# Patient Record
Sex: MALE | Race: WHITE | NOT HISPANIC OR LATINO | URBAN - METROPOLITAN AREA
[De-identification: names, ages, dates, MRNs, and addresses within clinical notes are randomized per-mention and may not be internally consistent; named-entity substitution may affect disease eponyms.]

---

## 2020-05-12 ENCOUNTER — INPATIENT (INPATIENT)
Facility: HOSPITAL | Age: 85
LOS: 6 days | Discharge: ANOTHER HOSPITAL | DRG: 305 | End: 2020-05-19
Attending: INTERNAL MEDICINE | Admitting: INTERNAL MEDICINE
Payer: MEDICARE

## 2020-05-12 VITALS
DIASTOLIC BLOOD PRESSURE: 57 MMHG | HEIGHT: 67 IN | HEART RATE: 60 BPM | WEIGHT: 164.91 LBS | RESPIRATION RATE: 16 BRPM | OXYGEN SATURATION: 100 % | SYSTOLIC BLOOD PRESSURE: 173 MMHG

## 2020-05-12 DIAGNOSIS — R07.9 CHEST PAIN, UNSPECIFIED: ICD-10-CM

## 2020-05-12 DIAGNOSIS — E03.9 HYPOTHYROIDISM, UNSPECIFIED: ICD-10-CM

## 2020-05-12 DIAGNOSIS — R31.9 HEMATURIA, UNSPECIFIED: ICD-10-CM

## 2020-05-12 DIAGNOSIS — I10 ESSENTIAL (PRIMARY) HYPERTENSION: ICD-10-CM

## 2020-05-12 DIAGNOSIS — Z29.9 ENCOUNTER FOR PROPHYLACTIC MEASURES, UNSPECIFIED: ICD-10-CM

## 2020-05-12 DIAGNOSIS — N40.0 BENIGN PROSTATIC HYPERPLASIA WITHOUT LOWER URINARY TRACT SYMPTOMS: ICD-10-CM

## 2020-05-12 DIAGNOSIS — I25.10 ATHEROSCLEROTIC HEART DISEASE OF NATIVE CORONARY ARTERY WITHOUT ANGINA PECTORIS: ICD-10-CM

## 2020-05-12 LAB
ALBUMIN SERPL ELPH-MCNC: 3.4 G/DL — LOW (ref 3.5–5)
ALP SERPL-CCNC: 89 U/L — SIGNIFICANT CHANGE UP (ref 40–120)
ALT FLD-CCNC: 30 U/L DA — SIGNIFICANT CHANGE UP (ref 10–60)
ANION GAP SERPL CALC-SCNC: 9 MMOL/L — SIGNIFICANT CHANGE UP (ref 5–17)
APTT BLD: 30.1 SEC — SIGNIFICANT CHANGE UP (ref 27.5–36.3)
AST SERPL-CCNC: 15 U/L — SIGNIFICANT CHANGE UP (ref 10–40)
BILIRUB SERPL-MCNC: 0.5 MG/DL — SIGNIFICANT CHANGE UP (ref 0.2–1.2)
BLD GP AB SCN SERPL QL: SIGNIFICANT CHANGE UP
BUN SERPL-MCNC: 46 MG/DL — HIGH (ref 7–18)
CALCIUM SERPL-MCNC: 8.5 MG/DL — SIGNIFICANT CHANGE UP (ref 8.4–10.5)
CHLORIDE SERPL-SCNC: 107 MMOL/L — SIGNIFICANT CHANGE UP (ref 96–108)
CO2 SERPL-SCNC: 23 MMOL/L — SIGNIFICANT CHANGE UP (ref 22–31)
CREAT SERPL-MCNC: 2.19 MG/DL — HIGH (ref 0.5–1.3)
GLUCOSE SERPL-MCNC: 177 MG/DL — HIGH (ref 70–99)
HCT VFR BLD CALC: 28 % — LOW (ref 39–50)
HCT VFR BLD CALC: 28.4 % — LOW (ref 39–50)
HGB BLD-MCNC: 9.4 G/DL — LOW (ref 13–17)
HGB BLD-MCNC: 9.5 G/DL — LOW (ref 13–17)
INR BLD: 1.16 RATIO — SIGNIFICANT CHANGE UP (ref 0.88–1.16)
LIDOCAIN IGE QN: 102 U/L — SIGNIFICANT CHANGE UP (ref 73–393)
MAGNESIUM SERPL-MCNC: 2.1 MG/DL — SIGNIFICANT CHANGE UP (ref 1.6–2.6)
MCHC RBC-ENTMCNC: 32.3 PG — SIGNIFICANT CHANGE UP (ref 27–34)
MCHC RBC-ENTMCNC: 32.4 PG — SIGNIFICANT CHANGE UP (ref 27–34)
MCHC RBC-ENTMCNC: 33.5 GM/DL — SIGNIFICANT CHANGE UP (ref 32–36)
MCHC RBC-ENTMCNC: 33.6 GM/DL — SIGNIFICANT CHANGE UP (ref 32–36)
MCV RBC AUTO: 96.2 FL — SIGNIFICANT CHANGE UP (ref 80–100)
MCV RBC AUTO: 96.9 FL — SIGNIFICANT CHANGE UP (ref 80–100)
NRBC # BLD: 0 /100 WBCS — SIGNIFICANT CHANGE UP (ref 0–0)
NRBC # BLD: 0 /100 WBCS — SIGNIFICANT CHANGE UP (ref 0–0)
NT-PROBNP SERPL-SCNC: 1348 PG/ML — HIGH (ref 0–450)
PLATELET # BLD AUTO: 187 K/UL — SIGNIFICANT CHANGE UP (ref 150–400)
PLATELET # BLD AUTO: 191 K/UL — SIGNIFICANT CHANGE UP (ref 150–400)
POTASSIUM SERPL-MCNC: 4.2 MMOL/L — SIGNIFICANT CHANGE UP (ref 3.5–5.3)
POTASSIUM SERPL-SCNC: 4.2 MMOL/L — SIGNIFICANT CHANGE UP (ref 3.5–5.3)
PROT SERPL-MCNC: 7.2 G/DL — SIGNIFICANT CHANGE UP (ref 6–8.3)
PROTHROM AB SERPL-ACNC: 13.2 SEC — HIGH (ref 10–12.9)
RBC # BLD: 2.91 M/UL — LOW (ref 4.2–5.8)
RBC # BLD: 2.93 M/UL — LOW (ref 4.2–5.8)
RBC # FLD: 13.6 % — SIGNIFICANT CHANGE UP (ref 10.3–14.5)
RBC # FLD: 13.7 % — SIGNIFICANT CHANGE UP (ref 10.3–14.5)
SODIUM SERPL-SCNC: 139 MMOL/L — SIGNIFICANT CHANGE UP (ref 135–145)
TROPONIN I SERPL-MCNC: 0.02 NG/ML — SIGNIFICANT CHANGE UP (ref 0–0.04)
TROPONIN I SERPL-MCNC: <0.015 NG/ML — SIGNIFICANT CHANGE UP (ref 0–0.04)
WBC # BLD: 13.63 K/UL — HIGH (ref 3.8–10.5)
WBC # BLD: 19.08 K/UL — HIGH (ref 3.8–10.5)
WBC # FLD AUTO: 13.63 K/UL — HIGH (ref 3.8–10.5)
WBC # FLD AUTO: 19.08 K/UL — HIGH (ref 3.8–10.5)

## 2020-05-12 PROCEDURE — 74176 CT ABD & PELVIS W/O CONTRAST: CPT | Mod: 26

## 2020-05-12 PROCEDURE — 99285 EMERGENCY DEPT VISIT HI MDM: CPT

## 2020-05-12 PROCEDURE — 71045 X-RAY EXAM CHEST 1 VIEW: CPT | Mod: 26

## 2020-05-12 PROCEDURE — 99222 1ST HOSP IP/OBS MODERATE 55: CPT

## 2020-05-12 RX ORDER — FERROUS SULFATE 325(65) MG
325 TABLET ORAL DAILY
Refills: 0 | Status: DISCONTINUED | OUTPATIENT
Start: 2020-05-12 | End: 2020-05-19

## 2020-05-12 RX ORDER — CEFTRIAXONE 500 MG/1
1000 INJECTION, POWDER, FOR SOLUTION INTRAMUSCULAR; INTRAVENOUS EVERY 24 HOURS
Refills: 0 | Status: DISCONTINUED | OUTPATIENT
Start: 2020-05-12 | End: 2020-05-19

## 2020-05-12 RX ORDER — ASPIRIN/CALCIUM CARB/MAGNESIUM 324 MG
81 TABLET ORAL DAILY
Refills: 0 | Status: DISCONTINUED | OUTPATIENT
Start: 2020-05-12 | End: 2020-05-12

## 2020-05-12 RX ORDER — SODIUM CHLORIDE 9 MG/ML
1000 INJECTION INTRAMUSCULAR; INTRAVENOUS; SUBCUTANEOUS
Refills: 0 | Status: DISCONTINUED | OUTPATIENT
Start: 2020-05-12 | End: 2020-05-14

## 2020-05-12 RX ORDER — FOLIC ACID 0.8 MG
1 TABLET ORAL DAILY
Refills: 0 | Status: DISCONTINUED | OUTPATIENT
Start: 2020-05-12 | End: 2020-05-19

## 2020-05-12 RX ORDER — ATENOLOL 25 MG/1
150 TABLET ORAL DAILY
Refills: 0 | Status: DISCONTINUED | OUTPATIENT
Start: 2020-05-12 | End: 2020-05-15

## 2020-05-12 RX ORDER — ATORVASTATIN CALCIUM 80 MG/1
40 TABLET, FILM COATED ORAL AT BEDTIME
Refills: 0 | Status: DISCONTINUED | OUTPATIENT
Start: 2020-05-12 | End: 2020-05-19

## 2020-05-12 RX ORDER — OXYCODONE AND ACETAMINOPHEN 5; 325 MG/1; MG/1
1 TABLET ORAL EVERY 4 HOURS
Refills: 0 | Status: DISCONTINUED | OUTPATIENT
Start: 2020-05-12 | End: 2020-05-13

## 2020-05-12 RX ORDER — CHOLECALCIFEROL (VITAMIN D3) 125 MCG
1000 CAPSULE ORAL DAILY
Refills: 0 | Status: DISCONTINUED | OUTPATIENT
Start: 2020-05-12 | End: 2020-05-19

## 2020-05-12 RX ORDER — ACETAMINOPHEN 500 MG
650 TABLET ORAL EVERY 6 HOURS
Refills: 0 | Status: DISCONTINUED | OUTPATIENT
Start: 2020-05-12 | End: 2020-05-19

## 2020-05-12 RX ORDER — LOSARTAN POTASSIUM 100 MG/1
100 TABLET, FILM COATED ORAL DAILY
Refills: 0 | Status: DISCONTINUED | OUTPATIENT
Start: 2020-05-12 | End: 2020-05-14

## 2020-05-12 RX ORDER — LEVOTHYROXINE SODIUM 125 MCG
112 TABLET ORAL DAILY
Refills: 0 | Status: DISCONTINUED | OUTPATIENT
Start: 2020-05-12 | End: 2020-05-19

## 2020-05-12 RX ORDER — CLOPIDOGREL BISULFATE 75 MG/1
75 TABLET, FILM COATED ORAL DAILY
Refills: 0 | Status: DISCONTINUED | OUTPATIENT
Start: 2020-05-12 | End: 2020-05-14

## 2020-05-12 RX ADMIN — Medication 1 MILLIGRAM(S): at 21:37

## 2020-05-12 RX ADMIN — CEFTRIAXONE 100 MILLIGRAM(S): 500 INJECTION, POWDER, FOR SOLUTION INTRAMUSCULAR; INTRAVENOUS at 17:51

## 2020-05-12 RX ADMIN — Medication 112 MICROGRAM(S): at 21:37

## 2020-05-12 RX ADMIN — OXYCODONE AND ACETAMINOPHEN 1 TABLET(S): 5; 325 TABLET ORAL at 18:47

## 2020-05-12 RX ADMIN — OXYCODONE AND ACETAMINOPHEN 1 TABLET(S): 5; 325 TABLET ORAL at 20:52

## 2020-05-12 RX ADMIN — LOSARTAN POTASSIUM 100 MILLIGRAM(S): 100 TABLET, FILM COATED ORAL at 21:37

## 2020-05-12 RX ADMIN — ATORVASTATIN CALCIUM 40 MILLIGRAM(S): 80 TABLET, FILM COATED ORAL at 21:34

## 2020-05-12 NOTE — ED ADULT NURSE NOTE - NSIMPLEMENTINTERV_GEN_ALL_ED
Implemented All Fall with Harm Risk Interventions:  [place] to call system. Call bell, personal items and telephone within reach. Instruct patient to call for assistance. Room bathroom lighting operational. Non-slip footwear when patient is off stretcher. Physically safe environment: no spills, clutter or unnecessary equipment. Stretcher in lowest position, wheels locked, appropriate side rails in place. Provide visual cue, wrist band, yellow gown, etc. Monitor gait and stability. Monitor for mental status changes and reorient to person, place, and time. Review medications for side effects contributing to fall risk. Reinforce activity limits and safety measures with patient and family. Provide visual clues: red socks.

## 2020-05-12 NOTE — H&P ADULT - PROBLEM SELECTOR PLAN 7
IMPROVE VTE Individual Risk Assessment    RISK                                                                Points  [  ] Previous VTE                                                  3  [  ] Thrombophilia                                               2  [  ] Lower limb paralysis                                      2        (unable to hold up >15 seconds)    [  ] Current Cancer                                              2         (within 6 months)  [x ] Immobilization > 24 hrs                                1  [  ] ICU/CCU stay > 24 hours                              1  [x  ] Age > 60                                                      1  IMPROVE VTE Score __2_DVT ppx SCD boots for now due to Hematuria   )

## 2020-05-12 NOTE — ED ADULT NURSE NOTE - ED STAT RN HANDOFF DETAILS
Patient admitted to telemetry with Box B NSR in no acute distress, CBI in progress. Report given to LORI Goncalves , patient to be transported via stretcher stable in no acute distress, safety maintained.

## 2020-05-12 NOTE — CONSULT NOTE ADULT - GENITOURINARY COMMENTS
3 way acevedo catheter in place, CBI at moderate gtt; acevedo catheter irrigated, blood clots obtained

## 2020-05-12 NOTE — ED ADULT TRIAGE NOTE - CHIEF COMPLAINT QUOTE
Hypotensive upon ems arrival, 250 NS given via left ac 20 gauge.  Alert and oriented.  Permanent acevedo changed 2 weeks ago

## 2020-05-12 NOTE — H&P ADULT - PROBLEM SELECTOR PLAN 1
Pt presnted with chest pain  DDx PE vs Pneunonia vs MI vs GERD vs Aortic Dissection vs Costochondritis vs Myositis  likely  cardiac,  given Hx of CAD, HTN, Dyslipidemia, Obesity, CVA, AFib  will admit to telemetry to r/o ACS.  EKG- no ST T wave changes.  cardiac enzymes X 1 negative.  will trend T2, T3  f/u ECHO  will start on aspirin, statin and low dose lopressor..   cardio consult- Dr Weinberg Pt presnted with chest pain  likely  cardiac,  given Hx of CAD, HTN, Dyslipidemia, Obesity, CVA, AFib  will admit to telemetry to r/o ACS.  EKG- no ST T wave changes.  cardiac enzymes X 1 negative.  will trend T2, T3  f/u ECHO  aspirin is on hold due to hematuria.   Continue with Plavix after weighing Risk vs Benefit   c/w statin and low dose lopressor..   cardio consult- Dr Weinberg

## 2020-05-12 NOTE — H&P ADULT - ASSESSMENT
88 yo Male from Home w/ PMHx of CAD s/p stent ([id number]), HTN, Hypothyroid, Neurogenic Bladder and BPH with chronic Infante, DM, CKD and PSHx Green Light PVP 2010, presented to ED w/ hematuria since yesterday. Admitted to medicine for r/o ACS and Hematuria.

## 2020-05-12 NOTE — CONSULT NOTE ADULT - ASSESSMENT
88 y/o Male w/ Hematuria; hx BPH s/p Green Laser PVP 2010; PMHx CAD s/p stents on Clopidogrel     -C/w 3 way acevedo catheter   -C/w CBI at mod gtt   -Irrigate acevedo PRN   -Monitor h/h levels PRN   -Transfuse PRN   -Hold anticoagulation if safe   -F/u CT abd/pelvis   -F/u Ucx   -Medical admission   -D/w Dr Sim and agrees 86 y/o Male w/ Hematuria; hx BPH s/p Green Laser PVP 2010, chronic acevedo catheter; PMHx CAD s/p stents on Clopidogrel     -C/w 3 way acevedo catheter   -C/w CBI at mod gtt   -Irrigate acevedo PRN   -Monitor h/h levels PRN   -Transfuse PRN   -Hold anticoagulation if safe   -F/u CT abd/pelvis   -F/u Ucx   -Medical admission   -D/w Dr Sim and agrees

## 2020-05-12 NOTE — CONSULT NOTE ADULT - SUBJECTIVE AND OBJECTIVE BOX
Attending: Dr Sim       HPI:  86 yo Male w/ PMHx of CAD s/p stent ([id number]), HTN, Hypothyroid, BPH with chronic Acevedo, PSHx Green Light PVP 2010, presented to ED w/ hematuria; Pt seen and examined at bedside, states hematuria developed yesterday, no abd pain, no fever, chills; Pt currently under care of Dr [facility], pt w/ chronic acevedo, states acevedo catheter is being exchanged every month, last acevedo catheter change was 2 weeks ago; pt admits to previous hx of hematuria, 6 months ago was seen at [facility] w/ hematuria; pt offers no other complaints; 3 way acevedo catheter placed in ED, CBI started.       PAST MEDICAL & SURGICAL HISTORY:  S/P CABG (Coronary Artery Bypass Graft): x 2 vessels -2005or 2006 [facility]  BPH (Benign Prostatic Hyperplasia)  Dyslipidemia  Hypothyroidism  PAD (Peripheral Artery Disease): lower legs  CAD (Coronary Artery Disease): CABG-5-6 yrs ago- [facility]  HTN - Hypertension  S/P CABG (Coronary Artery Bypass Graft): x 2 vessels 2006 [facility]  HTN - Hypertension      Allergies  morphine (Nausea)  Intolerances    SOCIAL HISTORY:  Unknown   FAMILY HISTORY:  Unknown     Vital Signs Last 24 Hrs  T(C): --  T(F): --  HR: 60 (12 May 2020 11:36) (60 - 60)  BP: 173/57 (12 May 2020 11:36) (173/57 - 173/57)  BP(mean): --  RR: 16 (12 May 2020 11:36) (16 - 16)  SpO2: 100% (12 May 2020 11:36) (100% - 100%)    I&O's Summary      LABS:                        9.5    13.63 )-----------( 191      ( 12 May 2020 12:45 )             28.4     05-12    139  |  107  |  46<H>  ----------------------------<  177<H>  4.2   |  23  |  2.19<H>    Ca    8.5      12 May 2020 12:45  Mg     2.1     05-12    TPro  7.2  /  Alb  3.4<L>  /  TBili  0.5  /  DBili  x   /  AST  15  /  ALT  30  /  AlkPhos  89  05-12    PT/INR - ( 12 May 2020 12:45 )   PT: 13.2 sec;   INR: 1.16 ratio         PTT - ( 12 May 2020 12:45 )  PTT:30.1 sec    CAPILLARY BLOOD GLUCOSE        LIVER FUNCTIONS - ( 12 May 2020 12:45 )  Alb: 3.4 g/dL / Pro: 7.2 g/dL / ALK PHOS: 89 U/L / ALT: 30 U/L DA / AST: 15 U/L / GGT: x           RADIOLOGY & ADDITIONAL STUDIES:  < from: Xray Chest 1 View-PORTABLE IMMEDIATE (05.12.20 @ 13:03) >  Apical lordotic view of the chest demonstrates the lungs to be clear. There is no pleural effusion. There is no pneumothorax.    The heart is normal in size. Sternotomy wires are present. There is no mediastinal or hilar mass.     The pulmonary vasculature is normal.     Mild thoracic degenerative changes are present.    IMPRESSION:    No acute infiltrate.    Sternotomy.        DISCRETE X-RAY DATA:  Percent of LEFT lung opacification: Clear  Percent of RIGHT lung opacification: Clear  Change in lung opacification from most recent x-ray (<=3 days): No Prior  Change from prior dated 3 or more days (same admission): No Prior    < end of copied text >

## 2020-05-12 NOTE — H&P ADULT - PROBLEM SELECTOR PLAN 6
IMPROVE VTE Individual Risk Assessment    RISK                                                                Points  [  ] Previous VTE                                                  3  [  ] Thrombophilia                                               2  [  ] Lower limb paralysis                                      2        (unable to hold up >15 seconds)    [  ] Current Cancer                                              2         (within 6 months)  [x ] Immobilization > 24 hrs                                1  [  ] ICU/CCU stay > 24 hours                              1  [x  ] Age > 60                                                      1  IMPROVE VTE Score __2_DVT ppx SCD boots for now due to Hematuria   ) f/u TSH    started on home meds synthroid

## 2020-05-12 NOTE — ED PROVIDER NOTE - PMH
BPH (Benign Prostatic Hyperplasia)    CAD (Coronary Artery Disease)  CABG-5-6 yrs ago- [facility]  Dyslipidemia    HTN - Hypertension    Hypothyroidism    PAD (Peripheral Artery Disease)  lower legs  S/P CABG (Coronary Artery Bypass Graft)  x 2 vessels -2005or 2006 [facility]

## 2020-05-12 NOTE — ED ADULT NURSE NOTE - OBJECTIVE STATEMENT
Patient BIBA from home for hematuria via acevedo catheter since yesterday. As per patient he has been bleeding with clots since yesterday on pain scale 5/10. Catheter was changed 2 weeks ago and he changed his bag this morning

## 2020-05-12 NOTE — H&P ADULT - PROBLEM SELECTOR PLAN 5
f/u TSH    started on home meds synthroid Pt takes Losartan 100  and Atenolol 150  Will start on Atenolol 100 for now.

## 2020-05-12 NOTE — H&P ADULT - NSICDXPASTSURGICALHX_GEN_ALL_CORE_FT
PAST SURGICAL HISTORY:  HTN - Hypertension     S/P CABG (Coronary Artery Bypass Graft) x 2 vessels 2006 [facility]

## 2020-05-12 NOTE — H&P ADULT - NSHPPHYSICALEXAM_GEN_ALL_CORE
Vital Signs Last 24 Hrs  T(C): 36.5 (12 May 2020 16:07), Max: 36.5 (12 May 2020 16:07)  T(F): 97.7 (12 May 2020 16:07), Max: 97.7 (12 May 2020 16:07)  HR: 56 (12 May 2020 16:07) (56 - 60)  BP: 148/58 (12 May 2020 16:07) (148/58 - 173/57)  BP(mean): --  RR: 16 (12 May 2020 16:07) (16 - 16)  SpO2: 100% (12 May 2020 16:07) (100% - 100%)  PHYSICAL EXAM:  GENERAL: NAD, obese  HEAD:  Atraumatic, Normocephalic  EYES:  conjunctiva and sclera clear  NECK: Supple, No JVD, Normal thyroid  CHEST/LUNG: Clear to auscultation. Clear to percussion bilaterally; No rales, rhonchi, wheezing, or rubs  HEART: Regular rate and rhythm; No murmurs, rubs, or gallops  ABDOMEN: Soft, Nontender, Nondistended; Bowel sounds present, hematuria is present   NERVOUS SYSTEM:  Alert & Oriented X3,    EXTREMITIES:  2+ Peripheral Pulses, No clubbing, cyanosis, or edema  SKIN: warm dry

## 2020-05-12 NOTE — ED PROVIDER NOTE - CLINICAL SUMMARY MEDICAL DECISION MAKING FREE TEXT BOX
88 yo male with gross hematuria, history of chronic Infante also here with chest pressure. Will perform CXR, labs, CT abdomen/pelvis, and CBI.

## 2020-05-12 NOTE — PROGRESS NOTE ADULT - SUBJECTIVE AND OBJECTIVE BOX
HPI:mplaint: bloody urine and via acevedo since last night.    · Chief Complaint: The patient is a 87y Male complaining of bloody urine.	  · HPI Objective Statement: 88 yo male HTN, Hypothyroid, BPH with chronic Acevedo. presents with hematuria since last night described as dark blood with associated pain to penis. Patient also developed chest pressure this morning. Patient states he was home alone. Patient walks with walker. Patient denies fever, cough, shortness of breath, or dizziness.	    PAST MEDICAL & SURGICAL HISTORY:  S/P CABG (Coronary Artery Bypass Graft): x 2 vessels -2005or 2006 [facility]  BPH (Benign Prostatic Hyperplasia)  Dyslipidemia  Hypothyroidism  PAD (Peripheral Artery Disease): lower legs  CAD (Coronary Artery Disease): CABG-5-6 yrs ago- [facility]  HTN - Hypertension  S/P CABG (Coronary Artery Bypass Graft): x 2 vessels 2006 [facility]  HTN - Hypertension    Patient is a 87y old  Male who presents with a chief complaint of     INTERVAL HPI/OVERNIGHT EVENTS:  T(C): 36.5 (05-12-20 @ 16:07), Max: 36.5 (05-12-20 @ 16:07)  HR: 56 (05-12-20 @ 16:07) (56 - 60)  BP: 148/58 (05-12-20 @ 16:07) (148/58 - 173/57)  RR: 16 (05-12-20 @ 16:07) (16 - 16)  SpO2: 100% (05-12-20 @ 16:07) (100% - 100%)  Wt(kg): --  I&O's Summary    12 May 2020 07:01  -  12 May 2020 16:30  --------------------------------------------------------  IN: 0 mL / OUT: 5300 mL / NET: -5300 mL        REVIEW OF SYSTEMS: denies fever, chills, SOB, palpitations, chest pain, abdominal pain, nausea, vomitting, diarrhea, constipation, dizziness    MEDICATIONS  (STANDING):    MEDICATIONS  (PRN):      PHYSICAL EXAM:  GENERAL: NAD, well-groomed, well-developed  HEAD:  Atraumatic, Normocephalic  EYES: EOMI, PERRLA, conjunctiva and sclera clear  ENMT: No tonsillar erythema, exudates, or enlargement; Moist mucous membranes, Good dentition, No lesions  NECK: Supple, No JVD, Normal thyroid  NERVOUS SYSTEM:  Alert & Oriented X3, Good concentration; Motor Strength 5/5 B/L upper and lower extremities; DTRs 2+ intact and symmetric  CHEST/LUNG: Clear to percussion bilaterally; No rales, rhonchi, wheezing, or rubs  HEART: Regular rate and rhythm; No murmurs, rubs, or gallops  ABDOMEN: Soft, Nontender, Nondistended; Bowel sounds present  EXTREMITIES:  2+ Peripheral Pulses, No clubbing, cyanosis, or edema  LYMPH: No lymphadenopathy noted  SKIN: No rashes or lesions  LABS:                        9.5    13.63 )-----------( 191      ( 12 May 2020 12:45 )             28.4     05-12    139  |  107  |  46<H>  ----------------------------<  177<H>  4.2   |  23  |  2.19<H>    Ca    8.5      12 May 2020 12:45  Mg     2.1     05-12    TPro  7.2  /  Alb  3.4<L>  /  TBili  0.5  /  DBili  x   /  AST  15  /  ALT  30  /  AlkPhos  89  05-12    PT/INR - ( 12 May 2020 12:45 )   PT: 13.2 sec;   INR: 1.16 ratio         PTT - ( 12 May 2020 12:45 )  PTT:30.1 sec    CAPILLARY BLOOD GLUCOSE

## 2020-05-12 NOTE — H&P ADULT - PROBLEM SELECTOR PLAN 2
In ED CBI is started   3 lumen Infante is exchanged.   f/u Urology   CBC q12  Transfuse if Hb <8   Pain management with Tylenol and Percocet for now

## 2020-05-12 NOTE — ED PROVIDER NOTE - OBJECTIVE STATEMENT
86 yo male HTN, Hypothyroid, BPH with chronic Infante. presents with hematuria since last night described as dark blood with associated pain to penis. Patient also developed chest pressure this morning. Patient states he was home alone. Patient walks with walker. Patient denies fever, cough, shortness of breath, or dizziness.

## 2020-05-12 NOTE — H&P ADULT - NSICDXPASTMEDICALHX_GEN_ALL_CORE_FT
PAST MEDICAL HISTORY:  BPH (Benign Prostatic Hyperplasia)     CAD (Coronary Artery Disease) CABG-5-6 yrs ago- [facility]    Dyslipidemia     HTN - Hypertension     Hypothyroidism     PAD (Peripheral Artery Disease) lower legs    S/P CABG (Coronary Artery Bypass Graft) x 2 vessels -2005or 2006 [facility]

## 2020-05-12 NOTE — H&P ADULT - NSHPREVIEWOFSYSTEMS_GEN_ALL_CORE
REVIEW OF SYSTEMS:    CONSTITUTIONAL: No weakness, fevers or chills  EYES/ENT: No visual changes;  No vertigo or throat pain   NECK: No pain or stiffness  RESPIRATORY: No cough, wheezing, hemoptysis; No shortness of breath  CARDIOVASCULAR:  chest pain or palpitations is present   GASTROINTESTINAL: No abdominal or epigastric pain. No nausea, vomiting, or hematemesis; No diarrhea or constipation. No melena or hematochezia.  GENITOURINARY: Hematuria is present   NEUROLOGICAL: No numbness or weakness  SKIN: No itching, burning, rashes, or lesions   All other review of systems is negative unless indicated above.

## 2020-05-12 NOTE — H&P ADULT - NSHPLABSRESULTS_GEN_ALL_CORE
LABS:                        9.4    19.08 )-----------( 187      ( 12 May 2020 16:30 )             28.0                         9.5    13.63 )-----------( 191      ( 12 May 2020 12:45 )             28.4     05-12    139  |  107  |  46<H>  ----------------------------<  177<H>  4.2   |  23  |  2.19<H>    Ca    8.5      12 May 2020 12:45  Mg     2.1     05-12    TPro  7.2  /  Alb  3.4<L>  /  TBili  0.5  /  DBili  x   /  AST  15  /  ALT  30  /  AlkPhos  89  05-12    PT/INR - ( 12 May 2020 12:45 )   PT: 13.2 sec;   INR: 1.16 ratio         PTT - ( 12 May 2020 12:45 )  PTT:30.1 sec    LIVER FUNCTIONS - ( 12 May 2020 12:45 )  Alb: 3.4 g/dL / Pro: 7.2 g/dL / ALK PHOS: 89 U/L / ALT: 30 U/L DA / AST: 15 U/L / GGT: x           Lipase, Serum: 102 U/L (05-12-20 @ 12:45)

## 2020-05-13 DIAGNOSIS — Z71.89 OTHER SPECIFIED COUNSELING: ICD-10-CM

## 2020-05-13 DIAGNOSIS — Z02.9 ENCOUNTER FOR ADMINISTRATIVE EXAMINATIONS, UNSPECIFIED: ICD-10-CM

## 2020-05-13 LAB
24R-OH-CALCIDIOL SERPL-MCNC: 49.8 NG/ML — SIGNIFICANT CHANGE UP (ref 30–80)
A1C WITH ESTIMATED AVERAGE GLUCOSE RESULT: 6.4 % — HIGH (ref 4–5.6)
ALBUMIN SERPL ELPH-MCNC: 3.2 G/DL — LOW (ref 3.5–5)
ALP SERPL-CCNC: 88 U/L — SIGNIFICANT CHANGE UP (ref 40–120)
ALT FLD-CCNC: 25 U/L DA — SIGNIFICANT CHANGE UP (ref 10–60)
ANION GAP SERPL CALC-SCNC: 7 MMOL/L — SIGNIFICANT CHANGE UP (ref 5–17)
AST SERPL-CCNC: 26 U/L — SIGNIFICANT CHANGE UP (ref 10–40)
BASOPHILS # BLD AUTO: 0.05 K/UL — SIGNIFICANT CHANGE UP (ref 0–0.2)
BASOPHILS NFR BLD AUTO: 0.4 % — SIGNIFICANT CHANGE UP (ref 0–2)
BILIRUB SERPL-MCNC: 0.5 MG/DL — SIGNIFICANT CHANGE UP (ref 0.2–1.2)
BUN SERPL-MCNC: 42 MG/DL — HIGH (ref 7–18)
CALCIUM SERPL-MCNC: 8.6 MG/DL — SIGNIFICANT CHANGE UP (ref 8.4–10.5)
CHLORIDE SERPL-SCNC: 108 MMOL/L — SIGNIFICANT CHANGE UP (ref 96–108)
CHOLEST SERPL-MCNC: 117 MG/DL — SIGNIFICANT CHANGE UP (ref 10–199)
CK MB BLD-MCNC: 3 % — SIGNIFICANT CHANGE UP (ref 0–3.5)
CK MB BLD-MCNC: 3.5 % — SIGNIFICANT CHANGE UP (ref 0–3.5)
CK MB CFR SERPL CALC: 13.6 NG/ML — HIGH (ref 0–3.6)
CK MB CFR SERPL CALC: 29 NG/ML — HIGH (ref 0–3.6)
CK SERPL-CCNC: 456 U/L — HIGH (ref 35–232)
CK SERPL-CCNC: 835 U/L — HIGH (ref 35–232)
CO2 SERPL-SCNC: 24 MMOL/L — SIGNIFICANT CHANGE UP (ref 22–31)
CREAT SERPL-MCNC: 1.87 MG/DL — HIGH (ref 0.5–1.3)
EOSINOPHIL # BLD AUTO: 0.12 K/UL — SIGNIFICANT CHANGE UP (ref 0–0.5)
EOSINOPHIL NFR BLD AUTO: 1 % — SIGNIFICANT CHANGE UP (ref 0–6)
ERYTHROCYTE [SEDIMENTATION RATE] IN BLOOD: 38 MM/HR — HIGH (ref 0–20)
ESTIMATED AVERAGE GLUCOSE: 137 MG/DL — HIGH (ref 68–114)
FERRITIN SERPL-MCNC: 185 NG/ML — SIGNIFICANT CHANGE UP (ref 30–400)
FOLATE SERPL-MCNC: 11.4 NG/ML — SIGNIFICANT CHANGE UP
GLUCOSE SERPL-MCNC: 128 MG/DL — HIGH (ref 70–99)
HCT VFR BLD CALC: 26.9 % — LOW (ref 39–50)
HDLC SERPL-MCNC: 38 MG/DL — LOW
HGB BLD-MCNC: 8.9 G/DL — LOW (ref 13–17)
IMM GRANULOCYTES NFR BLD AUTO: 0.5 % — SIGNIFICANT CHANGE UP (ref 0–1.5)
IRON SATN MFR SERPL: 26 % — SIGNIFICANT CHANGE UP (ref 20–55)
IRON SATN MFR SERPL: 51 UG/DL — LOW (ref 65–170)
LIPID PNL WITH DIRECT LDL SERPL: 56 MG/DL — SIGNIFICANT CHANGE UP
LYMPHOCYTES # BLD AUTO: 1.67 K/UL — SIGNIFICANT CHANGE UP (ref 1–3.3)
LYMPHOCYTES # BLD AUTO: 13.6 % — SIGNIFICANT CHANGE UP (ref 13–44)
MAGNESIUM SERPL-MCNC: 2.1 MG/DL — SIGNIFICANT CHANGE UP (ref 1.6–2.6)
MCHC RBC-ENTMCNC: 32.1 PG — SIGNIFICANT CHANGE UP (ref 27–34)
MCHC RBC-ENTMCNC: 33.1 GM/DL — SIGNIFICANT CHANGE UP (ref 32–36)
MCV RBC AUTO: 97.1 FL — SIGNIFICANT CHANGE UP (ref 80–100)
MONOCYTES # BLD AUTO: 1.02 K/UL — HIGH (ref 0–0.9)
MONOCYTES NFR BLD AUTO: 8.3 % — SIGNIFICANT CHANGE UP (ref 2–14)
NEUTROPHILS # BLD AUTO: 9.35 K/UL — HIGH (ref 1.8–7.4)
NEUTROPHILS NFR BLD AUTO: 76.2 % — SIGNIFICANT CHANGE UP (ref 43–77)
NRBC # BLD: 0 /100 WBCS — SIGNIFICANT CHANGE UP (ref 0–0)
PHOSPHATE SERPL-MCNC: 3.4 MG/DL — SIGNIFICANT CHANGE UP (ref 2.5–4.5)
PLATELET # BLD AUTO: 208 K/UL — SIGNIFICANT CHANGE UP (ref 150–400)
POTASSIUM SERPL-MCNC: 4.1 MMOL/L — SIGNIFICANT CHANGE UP (ref 3.5–5.3)
POTASSIUM SERPL-SCNC: 4.1 MMOL/L — SIGNIFICANT CHANGE UP (ref 3.5–5.3)
PROT SERPL-MCNC: 6.8 G/DL — SIGNIFICANT CHANGE UP (ref 6–8.3)
RBC # BLD: 2.77 M/UL — LOW (ref 4.2–5.8)
RBC # FLD: 13.7 % — SIGNIFICANT CHANGE UP (ref 10.3–14.5)
SARS-COV-2 RNA SPEC QL NAA+PROBE: SIGNIFICANT CHANGE UP
SODIUM SERPL-SCNC: 139 MMOL/L — SIGNIFICANT CHANGE UP (ref 135–145)
TIBC SERPL-MCNC: 195 UG/DL — LOW (ref 250–450)
TOTAL CHOLESTEROL/HDL RATIO MEASUREMENT: 3.1 RATIO — LOW (ref 3.4–9.6)
TRIGL SERPL-MCNC: 115 MG/DL — SIGNIFICANT CHANGE UP (ref 10–149)
TROPONIN I SERPL-MCNC: 0.61 NG/ML — HIGH (ref 0–0.04)
TROPONIN I SERPL-MCNC: 0.96 NG/ML — HIGH (ref 0–0.04)
TROPONIN I SERPL-MCNC: 1.13 NG/ML — HIGH (ref 0–0.04)
TROPONIN I SERPL-MCNC: 1.93 NG/ML — HIGH (ref 0–0.04)
TSH SERPL-MCNC: 2.87 UU/ML — SIGNIFICANT CHANGE UP (ref 0.34–4.82)
UIBC SERPL-MCNC: 144 UG/DL — SIGNIFICANT CHANGE UP (ref 110–370)
VIT B12 SERPL-MCNC: 1700 PG/ML — HIGH (ref 232–1245)
WBC # BLD: 12.27 K/UL — HIGH (ref 3.8–10.5)
WBC # FLD AUTO: 12.27 K/UL — HIGH (ref 3.8–10.5)

## 2020-05-13 RX ORDER — LIDOCAINE 4 G/100G
1 CREAM TOPICAL DAILY
Refills: 0 | Status: DISCONTINUED | OUTPATIENT
Start: 2020-05-13 | End: 2020-05-19

## 2020-05-13 RX ORDER — ONDANSETRON 8 MG/1
4 TABLET, FILM COATED ORAL EVERY 8 HOURS
Refills: 0 | Status: DISCONTINUED | OUTPATIENT
Start: 2020-05-13 | End: 2020-05-19

## 2020-05-13 RX ORDER — ISOSORBIDE DINITRATE 5 MG/1
10 TABLET ORAL THREE TIMES A DAY
Refills: 0 | Status: DISCONTINUED | OUTPATIENT
Start: 2020-05-13 | End: 2020-05-18

## 2020-05-13 RX ORDER — TRAMADOL HYDROCHLORIDE 50 MG/1
25 TABLET ORAL EVERY 8 HOURS
Refills: 0 | Status: DISCONTINUED | OUTPATIENT
Start: 2020-05-13 | End: 2020-05-19

## 2020-05-13 RX ORDER — SENNA PLUS 8.6 MG/1
2 TABLET ORAL AT BEDTIME
Refills: 0 | Status: DISCONTINUED | OUTPATIENT
Start: 2020-05-13 | End: 2020-05-19

## 2020-05-13 RX ADMIN — CEFTRIAXONE 100 MILLIGRAM(S): 500 INJECTION, POWDER, FOR SOLUTION INTRAMUSCULAR; INTRAVENOUS at 17:17

## 2020-05-13 RX ADMIN — Medication 112 MICROGRAM(S): at 05:36

## 2020-05-13 RX ADMIN — SENNA PLUS 2 TABLET(S): 8.6 TABLET ORAL at 22:25

## 2020-05-13 RX ADMIN — LIDOCAINE 1 PATCH: 4 CREAM TOPICAL at 12:00

## 2020-05-13 RX ADMIN — Medication 325 MILLIGRAM(S): at 12:00

## 2020-05-13 RX ADMIN — ISOSORBIDE DINITRATE 10 MILLIGRAM(S): 5 TABLET ORAL at 22:25

## 2020-05-13 RX ADMIN — OXYCODONE AND ACETAMINOPHEN 1 TABLET(S): 5; 325 TABLET ORAL at 05:38

## 2020-05-13 RX ADMIN — ISOSORBIDE DINITRATE 10 MILLIGRAM(S): 5 TABLET ORAL at 12:00

## 2020-05-13 RX ADMIN — ATORVASTATIN CALCIUM 40 MILLIGRAM(S): 80 TABLET, FILM COATED ORAL at 22:25

## 2020-05-13 RX ADMIN — ONDANSETRON 4 MILLIGRAM(S): 8 TABLET, FILM COATED ORAL at 17:41

## 2020-05-13 RX ADMIN — Medication 1 DROP(S): at 22:25

## 2020-05-13 RX ADMIN — ATENOLOL 150 MILLIGRAM(S): 25 TABLET ORAL at 05:36

## 2020-05-13 RX ADMIN — Medication 1000 UNIT(S): at 12:00

## 2020-05-13 RX ADMIN — Medication 1 DROP(S): at 14:36

## 2020-05-13 RX ADMIN — Medication 1 MILLIGRAM(S): at 12:00

## 2020-05-13 RX ADMIN — CLOPIDOGREL BISULFATE 75 MILLIGRAM(S): 75 TABLET, FILM COATED ORAL at 12:00

## 2020-05-13 RX ADMIN — LIDOCAINE 1 PATCH: 4 CREAM TOPICAL at 20:21

## 2020-05-13 NOTE — PROGRESS NOTE ADULT - SUBJECTIVE AND OBJECTIVE BOX
INTERVAL HPI/OVERNIGHT EVENTS:  Pt resting comfortably. No acute complaints. CBI running. Denies abd/back pain.    MEDICATIONS  (STANDING):  artificial  tears Solution 1 Drop(s) Both EYES three times a day  ATENolol  Tablet 150 milliGRAM(s) Oral daily  atorvastatin 40 milliGRAM(s) Oral at bedtime  cefTRIAXone   IVPB 1000 milliGRAM(s) IV Intermittent every 24 hours  cholecalciferol 1000 Unit(s) Oral daily  clopidogrel Tablet 75 milliGRAM(s) Oral daily  ferrous    sulfate 325 milliGRAM(s) Oral daily  folic acid 1 milliGRAM(s) Oral daily  levothyroxine 112 MICROGram(s) Oral daily  losartan 100 milliGRAM(s) Oral daily  sodium chloride 0.9%. 1000 milliLiter(s) (75 mL/Hr) IV Continuous <Continuous>    MEDICATIONS  (PRN):  acetaminophen   Tablet .. 650 milliGRAM(s) Oral every 6 hours PRN Temp greater or equal to 38C (100.4F), Mild Pain (1 - 3)    Vital Signs Last 24 Hrs  T(C): 36.3 (13 May 2020 08:15), Max: 36.5 (12 May 2020 16:07)  T(F): 97.4 (13 May 2020 08:15), Max: 97.7 (12 May 2020 16:07)  HR: 57 (13 May 2020 08:15) (52 - 60)  BP: 128/49 (13 May 2020 08:15) (122/53 - 173/57)  BP(mean): 72 (13 May 2020 04:15) (72 - 78)  RR: 18 (13 May 2020 08:15) (16 - 18)  SpO2: 100% (13 May 2020 08:15) (100% - 100%)    Physical:  General: A&Ox3. NAD.  Abdomen: Soft nondistended, no suprapubic tenderness.  Back: No CVAT b/l    I&O's Detail    12 May 2020 07:01  -  13 May 2020 07:00  --------------------------------------------------------  IN:  Total IN: 0 mL    OUT:    Continuous Bladder Irrigation: 5300 mL  Total OUT: 5300 mL    Total NET: -5300 mL          LABS:                        8.9    12.27 )-----------( 208      ( 13 May 2020 06:23 )             26.9             05-13    139  |  108  |  42<H>  ----------------------------<  128<H>  4.1   |  24  |  1.87<H>    Ca    8.6      13 May 2020 06:23  Phos  3.4     05-13  Mg     2.1     05-13    TPro  6.8  /  Alb  3.2<L>  /  TBili  0.5  /  DBili  x   /  AST  26  /  ALT  25  /  AlkPhos  88  05-13      87y.o. Male INTERVAL HPI/OVERNIGHT EVENTS:  Pt resting comfortably. No acute complaints. CBI running. Denies abd/back pain.    MEDICATIONS  (STANDING):  artificial  tears Solution 1 Drop(s) Both EYES three times a day  ATENolol  Tablet 150 milliGRAM(s) Oral daily  atorvastatin 40 milliGRAM(s) Oral at bedtime  cefTRIAXone   IVPB 1000 milliGRAM(s) IV Intermittent every 24 hours  cholecalciferol 1000 Unit(s) Oral daily  clopidogrel Tablet 75 milliGRAM(s) Oral daily  ferrous    sulfate 325 milliGRAM(s) Oral daily  folic acid 1 milliGRAM(s) Oral daily  levothyroxine 112 MICROGram(s) Oral daily  losartan 100 milliGRAM(s) Oral daily  sodium chloride 0.9%. 1000 milliLiter(s) (75 mL/Hr) IV Continuous <Continuous>    MEDICATIONS  (PRN):  acetaminophen   Tablet .. 650 milliGRAM(s) Oral every 6 hours PRN Temp greater or equal to 38C (100.4F), Mild Pain (1 - 3)    Vital Signs Last 24 Hrs  T(C): 36.3 (13 May 2020 08:15), Max: 36.5 (12 May 2020 16:07)  T(F): 97.4 (13 May 2020 08:15), Max: 97.7 (12 May 2020 16:07)  HR: 57 (13 May 2020 08:15) (52 - 60)  BP: 128/49 (13 May 2020 08:15) (122/53 - 173/57)  BP(mean): 72 (13 May 2020 04:15) (72 - 78)  RR: 18 (13 May 2020 08:15) (16 - 18)  SpO2: 100% (13 May 2020 08:15) (100% - 100%)    Physical:  General: A&Ox3. NAD.  Abdomen: Soft nondistended, no suprapubic tenderness.  Back: No CVAT b/l    I&O's Detail    12 May 2020 07:01  -  13 May 2020 07:00  --------------------------------------------------------  IN:  Total IN: 0 mL    OUT:    Continuous Bladder Irrigation: 5300 mL light pink  Total OUT: 5300 mL    Total NET: -5300 mL    LABS:                        8.9    12.27 )-----------( 208      ( 13 May 2020 06:23 )             26.9             05-13    139  |  108  |  42<H>  ----------------------------<  128<H>  4.1   |  24  |  1.87<H>    Ca    8.6      13 May 2020 06:23  Phos  3.4     05-13  Mg     2.1     05-13    TPro  6.8  /  Alb  3.2<L>  /  TBili  0.5  /  DBili  x   /  AST  26  /  ALT  25  /  AlkPhos  88  05-13    < from: CT Abdomen and Pelvis No Cont (05.12.20 @ 15:04) >  IMPRESSION:     No urinary tract calculi or hydronephrosis.    Large blood clot in the bladder lumen. Follow-up cystoscopy to exclude an underlying mass.    Markedly Enlarged prostate with intravesical component of BPH.    < end of copied text >

## 2020-05-13 NOTE — PHYSICAL THERAPY INITIAL EVALUATION ADULT - GENERAL OBSERVATIONS, REHAB EVAL
pt emr review, ED holding referral, aerobic loss limiting functional mobility, cbi acevedo cath, CM, assistance required with bedmobility seated edge of bed assessment

## 2020-05-13 NOTE — CONSULT NOTE ADULT - ATTENDING COMMENTS
Patient was seen and examined by me on 05/13/2020,interim events noted,labs and radiology studies reviewed.  Salvador Weinberg MD,FACC.  [address] [place] [address].  [facility].  [phone]

## 2020-05-13 NOTE — CHART NOTE - NSCHARTNOTEFT_GEN_A_CORE
Patient's CT scan revealed large blood clot in urinary bladder with acevedo bag with blood tinged urine. Patient's CBI was stopped, the 22F 3-way acevedo catheter was removed and a 22F 6-eye acevedo catheter was placed using sterile technique in order to irrigate the blader with normal saline. Multiple small to medium pieces of clots evacuated with continued vigorous manual irrigation. The 6-eye catheter was exchanged back to new 22F 3-way acevedo catheter using sterile technique and CBI was restarted on slow drip with clear output.     - CBI now clamped: keep clamped and monitor urine output  - If urine output remains clear patient will follow-up with his outpatient urologist  - If urine output becomes bloody again pt may need cystoscopy

## 2020-05-13 NOTE — PROGRESS NOTE ADULT - SUBJECTIVE AND OBJECTIVE BOX
HPI:  88 yo Male from Home w/ PMHx of CAD s/p stent ([id number]), HTN, Hypothyroid, Neurogenic Bladder and BPH with chronic Acevedo, DM, CKD and PSHx Green Light PVP 2010, presented to ED w/ hematuria since yesterday. He c/o Chest pain, palpitations. It is intermittent, on and off, aggravated with exertion and relieved with rest. His Cardiologist is Dr Cunha [phone].  Pt currently under care of Dr [facility], pt w/ chronic acevedo, states acevedo catheter is being exchanged every month, last acevedo catheter change was 2 weeks ago; pt admits to previous hx of hematuria, 6 months ago was seen at [facility] w/ hematuria;   [facility] full code (12 May 2020 15:28)      Patient is a 87y old  Male who presents with a chief complaint of Hematuria (13 May 2020 10:45)      INTERVAL HPI/OVERNIGHT EVENTS:  T(C): 36.9 (05-13-20 @ 11:33), Max: 36.9 (05-13-20 @ 11:33)  HR: 55 (05-13-20 @ 11:33) (52 - 57)  BP: 138/68 (05-13-20 @ 11:33) (122/53 - 152/55)  RR: 20 (05-13-20 @ 11:33) (16 - 20)  SpO2: 98% (05-13-20 @ 11:33) (97% - 100%)  Wt(kg): --  I&O's Summary    12 May 2020 07:01  -  13 May 2020 07:00  --------------------------------------------------------  IN: 0 mL / OUT: 5300 mL / NET: -5300 mL    13 May 2020 07:01  -  13 May 2020 15:13  --------------------------------------------------------  IN: 3000 mL / OUT: 4500 mL / NET: -1500 mL        REVIEW OF SYSTEMS: denies fever, chills, SOB, palpitations, chest pain, abdominal pain, nausea, vomitting, diarrhea, constipation, dizziness    MEDICATIONS  (STANDING):  artificial  tears Solution 1 Drop(s) Both EYES three times a day  ATENolol  Tablet 150 milliGRAM(s) Oral daily  atorvastatin 40 milliGRAM(s) Oral at bedtime  cefTRIAXone   IVPB 1000 milliGRAM(s) IV Intermittent every 24 hours  cholecalciferol 1000 Unit(s) Oral daily  clopidogrel Tablet 75 milliGRAM(s) Oral daily  ferrous    sulfate 325 milliGRAM(s) Oral daily  folic acid 1 milliGRAM(s) Oral daily  isosorbide   dinitrate Tablet (ISORDIL) 10 milliGRAM(s) Oral three times a day  levothyroxine 112 MICROGram(s) Oral daily  lidocaine   Patch 1 Patch Transdermal daily  losartan 100 milliGRAM(s) Oral daily  sodium chloride 0.9%. 1000 milliLiter(s) (75 mL/Hr) IV Continuous <Continuous>    MEDICATIONS  (PRN):  acetaminophen   Tablet .. 650 milliGRAM(s) Oral every 6 hours PRN Temp greater or equal to 38C (100.4F), Mild Pain (1 - 3)  traMADol 25 milliGRAM(s) Oral every 8 hours PRN Moderate Pain (4 - 6)      PHYSICAL EXAM:  GENERAL: NAD, well-groomed, well-developed  HEAD:  Atraumatic, Normocephalic  EYES: EOMI, PERRLA, conjunctiva and sclera clear  ENMT: No tonsillar erythema, exudates, or enlargement; Moist mucous membranes, Good dentition, No lesions  NECK: Supple, No JVD, Normal thyroid  NERVOUS SYSTEM:  Alert & Oriented X3, Good concentration; Motor Strength 5/5 B/L upper and lower extremities; DTRs 2+ intact and symmetric  CHEST/LUNG: Clear to percussion bilaterally; No rales, rhonchi, wheezing, or rubs  HEART: Regular rate and rhythm; No murmurs, rubs, or gallops  ABDOMEN: Soft, Nontender, Nondistended; Bowel sounds present  EXTREMITIES:  2+ Peripheral Pulses, No clubbing, cyanosis, or edema  LYMPH: No lymphadenopathy noted  SKIN: No rashes or lesions  LABS:                        8.9    12.27 )-----------( 208      ( 13 May 2020 06:23 )             26.9     05-13    139  |  108  |  42<H>  ----------------------------<  128<H>  4.1   |  24  |  1.87<H>    Ca    8.6      13 May 2020 06:23  Phos  3.4     05-13  Mg     2.1     05-13    TPro  6.8  /  Alb  3.2<L>  /  TBili  0.5  /  DBili  x   /  AST  26  /  ALT  25  /  AlkPhos  88  05-13    PT/INR - ( 12 May 2020 12:45 )   PT: 13.2 sec;   INR: 1.16 ratio         PTT - ( 12 May 2020 12:45 )  PTT:30.1 sec    CAPILLARY BLOOD GLUCOSE

## 2020-05-13 NOTE — CONSULT NOTE ADULT - ASSESSMENT
86 yo Male from Home w/ PMHx of CAD s/p stent ([id number]), HTN, Hypothyroid, Neurogenic Bladder and BPH with chronic Infante, DM, CKD and PSHx Green Light PVP 2010, presented to ED w/ hematuria since yesterday. He c/o Chest pain, palpitations. It is intermittent, on and off, aggravated with exertion and relieved with rest.       Problem/Plan - 1:  ·  Problem: Chest pain -NSTEMI.  Plan: Pt presented with chest pain on exertion  likely  cardiac,  given Hx of CAD, HTN, Dyslipidemia, Obesity, CVA,  -EKG- no ST T wave changes.  -Troponins trending up  -TTE        Problem/Plan - 2:  ·  Problem: Hematuria.  Plan: In ED CBI is started   3 lumen Infante is exchanged.   Resolving hematuria  Hct stable      Problem/Plan - 3:  ·  Problem: CAD (Coronary Artery Disease).  Plan: continue with clopidogrel and statin.     Problem/Plan - 4:  ·  Problem: BPH (Benign Prostatic Hyperplasia).  Plan: c/w Infante.     Problem/Plan - 5:  ·  Problem: Hypertension.  Plan: Pt takes Losartan 100  and Atenolol 150  Will start on Atenolol 100 for now.     Problem/Plan - 6:  Problem: Hypothyroidism. Plan:Thyroid Stimulating Hormone, Serum (05.13.20 @ 06:23)    Thyroid Stimulating Hormone, Serum: 2.87 uU/mL  started on home meds synthroid.

## 2020-05-13 NOTE — CONSULT NOTE ADULT - SUBJECTIVE AND OBJECTIVE BOX
DATE OF SERVICE:Patient was seen,examined and evaluated on-05/13/2020    CHIEF COMPLAINT:Elevated troponins    HPI:88 yo Male from Home w/ PMHx of CAD s/p stent ([id number]), HTN, Hypothyroid, Neurogenic Bladder and BPH with chronic Acevedo, T2DM, CKD and PSHx Green Light PVP 2010, presented to ED w/ hematuria since yesterday. He c/o Chest pain, palpitations. It is intermittent, on and off, aggravated with exertion and relieved with rest.  Pt currently under care of Dr [facility], pt w/ chronic acevedo, states acevedo catheter is being exchanged every month, last acevedo catheter change was 2 weeks ago; pt admits to previous hx of hematuria, 6 months ago was seen at [facility] w/ hematuria; noted to have elevated troponins.        [facility] full code (12 May 2020 15:28)      PAST MEDICAL & SURGICAL HISTORY:  S/P CABG (Coronary Artery Bypass Graft): x 2 vessels -2005or 2006 [facility]  BPH (Benign Prostatic Hyperplasia)  Dyslipidemia  Hypothyroidism  PAD (Peripheral Artery Disease): lower legs  CAD (Coronary Artery Disease): CABG-5-6 yrs ago- [facility]  HTN - Hypertension  S/P CABG (Coronary Artery Bypass Graft): x 2 vessels 2006 [facility]  HTN - Hypertension      MEDICATIONS  (STANDING):  artificial  tears Solution 1 Drop(s) Both EYES three times a day  ATENolol  Tablet 150 milliGRAM(s) Oral daily  atorvastatin 40 milliGRAM(s) Oral at bedtime  cefTRIAXone   IVPB 1000 milliGRAM(s) IV Intermittent every 24 hours  cholecalciferol 1000 Unit(s) Oral daily  clopidogrel Tablet 75 milliGRAM(s) Oral daily  ferrous    sulfate 325 milliGRAM(s) Oral daily  folic acid 1 milliGRAM(s) Oral daily  levothyroxine 112 MICROGram(s) Oral daily  losartan 100 milliGRAM(s) Oral daily/  sodium chloride 0.9%. 1000 milliLiter(s) (75 mL/Hr) IV Continuous <Continuous>    MEDICATIONS  (PRN):  acetaminophen   Tablet .. 650 milliGRAM(s) Oral every 6 hours PRN Temp greater or equal to 38C (100.4F), Mild Pain (1 - 3)      FAMILY HISTORY:  No family history of premature coronary artery disease or sudden cardiac death    SOCIAL HISTORY:  Smoking-Former Smoker  Alcohol-Denies  Ilicit Drug use-Denies    REVIEW OF SYSTEMS:  Constitutional: [ ] fever, [ ]weight loss, [ ]fatigue Activity [ ] Bedbound,[x Ambulates [x Unassisted[ ] Cane/Walker [ ] Assistence.  Eyes: [ ] visual changes  Respiratory: [ ]shortness of breath;  [ ] cough, [ ]wheezing, [ ]chills, [ ]hemoptysis  Cardiovascular: [ ] chest pain, [ ]palpitations, [ ]dizziness,  [ ]leg swelling[ ]orthopnea [ ]PND  Gastrointestinal: [ ] abdominal pain, [ ]nausea, [ ]vomiting,  [ ]diarrhea,[ ]constipation  Genitourinary: [ ] dysuria, [ ] hematuria  Neurologic: [ ] headaches [ ] tremors[ ] weakness  Skin: [ ] itching, [ ]burning, [ ] rashes  Endocrine: [ ] heat or cold intolerance  Musculoskeletal: [ ] joint pain or swelling; [ ] muscle, back, or extremity pain  Psychiatric: [ ] depression, [ ]anxiety, [ ]mood swings, or [ ]difficulty sleeping  Hematologic: [ ] easy bruising, [ ] bleeding gums       [ x] All others negative	  [ ] Unable to obtain    Vital Signs Last 24 Hrs  T(C): 36.3 (13 May 2020 08:15), Max: 36.5 (12 May 2020 16:07)  T(F): 97.4 (13 May 2020 08:15), Max: 97.7 (12 May 2020 16:07)  HR: 53 (13 May 2020 10:37) (52 - 60)  BP: 128/49 (13 May 2020 08:15) (122/53 - 173/57)  BP(mean): 72 (13 May 2020 04:15) (72 - 78)  RR: 18 (13 May 2020 08:15) (16 - 18)  SpO2: 97% (13 May 2020 10:37) (97% - 100%)  I&O's Summary    12 May 2020 07:01  -  13 May 2020 07:00  --------------------------------------------------------  IN: 0 mL / OUT: 5300 mL / NET: -5300 mL        PHYSICAL EXAM:  General: No acute distress BMI-25.8  HEENT: EOMI, PERRL[ ] Icteric  Neck: Supple, No JVD  Lungs: Equal air entry bilaterally; [ ] Rales [ ] Rhonchi [ ] Wheezing  Heart: Regular rate and rhythm;[x] Murmurs-  2/6 [x] Systolic [ ] Diastolic [ ] Radiation,No rubs, or gallops  Abdomen: Nontender, bowel sounds present  Extremities: No clubbing, cyanosis, or edema[ ] Calf tenderness  Nervous system:  Alert & Oriented X3, no focal deficits  Psychiatric: Normal affect  Skin: No rashes or lesions      LABS:  05-13    139  |  108  |  42<H>  ----------------------------<  128<H>  4.1   |  24  |  1.87<H>    Ca    8.6      13 May 2020 06:23  Phos  3.4     05-13  Mg     2.1     05-13    TPro  6.8  /  Alb  3.2<L>  /  TBili  0.5  /  DBili  x   /  AST  26  /  ALT  25  /  AlkPhos  88  05-13    Creatinine Trend: 1.87<--, 2.19<--                        8.9    12.27 )-----------( 208      ( 13 May 2020 06:23 )             26.9     PT/INR - ( 12 May 2020 12:45 )   PT: 13.2 sec;   INR: 1.16 ratio         PTT - ( 12 May 2020 12:45 )  PTT:30.1 sec    Lipid Panel: Cholesterol, Serum 117  Direct LDL 56  HDL Cholesterol, Serum 38  Triglycerides, Serum 115    Cardiac Enzymes: CARDIAC MARKERS ( 13 May 2020 09:03 )  0.960 ng/mL / x     / x     / x     / x      CARDIAC MARKERS ( 13 May 2020 06:23 )  0.610 ng/mL / x     / 456 U/L / x     / 13.6 ng/mL  CARDIAC MARKERS ( 12 May 2020 19:02 )  0.019 ng/mL / x     / x     / x     / x      CARDIAC MARKERS ( 12 May 2020 12:45 )  <0.015 ng/mL / x     / 111 U/L / x     / 3.4 ng/mL      Serum Pro-Brain Natriuretic Peptide: 1348 pg/mL (05-12-20 @ 12:45)      COVID-19 PCR . (05.12.20 @ 15:21)    COVID-19 PCR: NotDetected    RADIOLOGY: CT ABDOMEN AND PELVIS            IMPRESSION:   No urinary tract calculi or hydronephrosis.  Large blood clot in the bladder lumen. Follow-up cystoscopy to exclude an underlying mass.  Markedly Enlarged prostate with intravesical component of BPH      ECG [my interpretation]:Sinus rhythm with sinus arrhythmia at 60 BPM QTc 442 ms    TELEMETRY:Sinus rhythm

## 2020-05-14 LAB
ALBUMIN SERPL ELPH-MCNC: 3 G/DL — LOW (ref 3.5–5)
ALP SERPL-CCNC: 78 U/L — SIGNIFICANT CHANGE UP (ref 40–120)
ALT FLD-CCNC: 25 U/L DA — SIGNIFICANT CHANGE UP (ref 10–60)
ANION GAP SERPL CALC-SCNC: 9 MMOL/L — SIGNIFICANT CHANGE UP (ref 5–17)
AST SERPL-CCNC: 46 U/L — HIGH (ref 10–40)
BILIRUB SERPL-MCNC: 0.3 MG/DL — SIGNIFICANT CHANGE UP (ref 0.2–1.2)
BUN SERPL-MCNC: 43 MG/DL — HIGH (ref 7–18)
CALCIUM SERPL-MCNC: 8.5 MG/DL — SIGNIFICANT CHANGE UP (ref 8.4–10.5)
CHLORIDE SERPL-SCNC: 106 MMOL/L — SIGNIFICANT CHANGE UP (ref 96–108)
CO2 SERPL-SCNC: 23 MMOL/L — SIGNIFICANT CHANGE UP (ref 22–31)
CREAT SERPL-MCNC: 1.93 MG/DL — HIGH (ref 0.5–1.3)
GLUCOSE BLDC GLUCOMTR-MCNC: 158 MG/DL — HIGH (ref 70–99)
GLUCOSE SERPL-MCNC: 110 MG/DL — HIGH (ref 70–99)
HCT VFR BLD CALC: 23.5 % — LOW (ref 39–50)
HCT VFR BLD CALC: 24.2 % — LOW (ref 39–50)
HGB BLD-MCNC: 7.9 G/DL — LOW (ref 13–17)
HGB BLD-MCNC: 8.1 G/DL — LOW (ref 13–17)
MCHC RBC-ENTMCNC: 31.9 PG — SIGNIFICANT CHANGE UP (ref 27–34)
MCHC RBC-ENTMCNC: 32.6 GM/DL — SIGNIFICANT CHANGE UP (ref 32–36)
MCHC RBC-ENTMCNC: 32.9 PG — SIGNIFICANT CHANGE UP (ref 27–34)
MCHC RBC-ENTMCNC: 34.5 GM/DL — SIGNIFICANT CHANGE UP (ref 32–36)
MCV RBC AUTO: 95.5 FL — SIGNIFICANT CHANGE UP (ref 80–100)
MCV RBC AUTO: 97.6 FL — SIGNIFICANT CHANGE UP (ref 80–100)
NRBC # BLD: 0 /100 WBCS — SIGNIFICANT CHANGE UP (ref 0–0)
NRBC # BLD: 0 /100 WBCS — SIGNIFICANT CHANGE UP (ref 0–0)
PLATELET # BLD AUTO: 173 K/UL — SIGNIFICANT CHANGE UP (ref 150–400)
PLATELET # BLD AUTO: 207 K/UL — SIGNIFICANT CHANGE UP (ref 150–400)
POTASSIUM SERPL-MCNC: 3.7 MMOL/L — SIGNIFICANT CHANGE UP (ref 3.5–5.3)
POTASSIUM SERPL-SCNC: 3.7 MMOL/L — SIGNIFICANT CHANGE UP (ref 3.5–5.3)
PROT SERPL-MCNC: 6.2 G/DL — SIGNIFICANT CHANGE UP (ref 6–8.3)
RBC # BLD: 2.46 M/UL — LOW (ref 4.2–5.8)
RBC # BLD: 2.48 M/UL — LOW (ref 4.2–5.8)
RBC # FLD: 13.6 % — SIGNIFICANT CHANGE UP (ref 10.3–14.5)
RBC # FLD: 13.7 % — SIGNIFICANT CHANGE UP (ref 10.3–14.5)
SODIUM SERPL-SCNC: 138 MMOL/L — SIGNIFICANT CHANGE UP (ref 135–145)
TROPONIN I SERPL-MCNC: 1.92 NG/ML — HIGH (ref 0–0.04)
WBC # BLD: 11.46 K/UL — HIGH (ref 3.8–10.5)
WBC # BLD: 9.41 K/UL — SIGNIFICANT CHANGE UP (ref 3.8–10.5)
WBC # FLD AUTO: 11.46 K/UL — HIGH (ref 3.8–10.5)
WBC # FLD AUTO: 9.41 K/UL — SIGNIFICANT CHANGE UP (ref 3.8–10.5)

## 2020-05-14 PROCEDURE — 99232 SBSQ HOSP IP/OBS MODERATE 35: CPT

## 2020-05-14 PROCEDURE — 93010 ELECTROCARDIOGRAM REPORT: CPT

## 2020-05-14 RX ORDER — ASPIRIN/CALCIUM CARB/MAGNESIUM 324 MG
81 TABLET ORAL DAILY
Refills: 0 | Status: DISCONTINUED | OUTPATIENT
Start: 2020-05-14 | End: 2020-05-19

## 2020-05-14 RX ORDER — ASPIRIN/CALCIUM CARB/MAGNESIUM 324 MG
81 TABLET ORAL DAILY
Refills: 0 | Status: DISCONTINUED | OUTPATIENT
Start: 2020-05-14 | End: 2020-05-14

## 2020-05-14 RX ADMIN — ISOSORBIDE DINITRATE 10 MILLIGRAM(S): 5 TABLET ORAL at 05:49

## 2020-05-14 RX ADMIN — LIDOCAINE 1 PATCH: 4 CREAM TOPICAL at 23:00

## 2020-05-14 RX ADMIN — CEFTRIAXONE 100 MILLIGRAM(S): 500 INJECTION, POWDER, FOR SOLUTION INTRAMUSCULAR; INTRAVENOUS at 17:03

## 2020-05-14 RX ADMIN — LIDOCAINE 1 PATCH: 4 CREAM TOPICAL at 19:24

## 2020-05-14 RX ADMIN — Medication 1000 UNIT(S): at 11:36

## 2020-05-14 RX ADMIN — Medication 1 DROP(S): at 05:49

## 2020-05-14 RX ADMIN — Medication 1 MILLIGRAM(S): at 11:36

## 2020-05-14 RX ADMIN — Medication 325 MILLIGRAM(S): at 11:36

## 2020-05-14 RX ADMIN — Medication 1 DROP(S): at 13:58

## 2020-05-14 RX ADMIN — Medication 112 MICROGRAM(S): at 05:49

## 2020-05-14 RX ADMIN — ISOSORBIDE DINITRATE 10 MILLIGRAM(S): 5 TABLET ORAL at 13:58

## 2020-05-14 RX ADMIN — LIDOCAINE 1 PATCH: 4 CREAM TOPICAL at 00:00

## 2020-05-14 RX ADMIN — Medication 650 MILLIGRAM(S): at 20:13

## 2020-05-14 RX ADMIN — ATORVASTATIN CALCIUM 40 MILLIGRAM(S): 80 TABLET, FILM COATED ORAL at 21:51

## 2020-05-14 RX ADMIN — SENNA PLUS 2 TABLET(S): 8.6 TABLET ORAL at 21:51

## 2020-05-14 RX ADMIN — LIDOCAINE 1 PATCH: 4 CREAM TOPICAL at 11:36

## 2020-05-14 RX ADMIN — CLOPIDOGREL BISULFATE 75 MILLIGRAM(S): 75 TABLET, FILM COATED ORAL at 11:36

## 2020-05-14 RX ADMIN — ISOSORBIDE DINITRATE 10 MILLIGRAM(S): 5 TABLET ORAL at 21:51

## 2020-05-14 RX ADMIN — Medication 1 DROP(S): at 21:51

## 2020-05-14 NOTE — PROGRESS NOTE ADULT - SUBJECTIVE AND OBJECTIVE BOX
INTERVAL HPI/OVERNIGHT EVENTS:  Pt resting comfortably. No acute complaints.   CBI clamped.   Denies abd pain.  CT reviewed with attending. Given lack of clots from manual irrigation yesterday, CT abnormal imaging likely enlarged median lobe of prostate.      MEDICATIONS  (STANDING):  artificial  tears Solution 1 Drop(s) Both EYES three times a day  ATENolol  Tablet 150 milliGRAM(s) Oral daily  atorvastatin 40 milliGRAM(s) Oral at bedtime  cefTRIAXone   IVPB 1000 milliGRAM(s) IV Intermittent every 24 hours  cholecalciferol 1000 Unit(s) Oral daily  clopidogrel Tablet 75 milliGRAM(s) Oral daily  ferrous    sulfate 325 milliGRAM(s) Oral daily  folic acid 1 milliGRAM(s) Oral daily  isosorbide   dinitrate Tablet (ISORDIL) 10 milliGRAM(s) Oral three times a day  levothyroxine 112 MICROGram(s) Oral daily  lidocaine   Patch 1 Patch Transdermal daily  losartan 100 milliGRAM(s) Oral daily  senna 2 Tablet(s) Oral at bedtime  sodium chloride 0.9%. 1000 milliLiter(s) (75 mL/Hr) IV Continuous <Continuous>    MEDICATIONS  (PRN):  acetaminophen   Tablet .. 650 milliGRAM(s) Oral every 6 hours PRN Temp greater or equal to 38C (100.4F), Mild Pain (1 - 3)  ondansetron Injectable 4 milliGRAM(s) IV Push every 8 hours PRN Nausea and/or Vomiting  traMADol 25 milliGRAM(s) Oral every 8 hours PRN Moderate Pain (4 - 6)      Vital Signs Last 24 Hrs  T(C): 36.4 (14 May 2020 07:47), Max: 37 (14 May 2020 04:43)  T(F): 97.5 (14 May 2020 07:47), Max: 98.6 (14 May 2020 04:43)  HR: 56 (14 May 2020 07:47) (53 - 62)  BP: 102/36 (14 May 2020 07:47) (92/44 - 138/68)  BP(mean): --  RR: 18 (14 May 2020 07:47) (17 - 20)  SpO2: 97% (14 May 2020 07:47) (95% - 99%)    Physical:  General: NAD.  Abdomen: Soft nondistended, no suprapubic tenderness.  Back: No CVAT b/l    I&O's Detail    13 May 2020 07:01  -  14 May 2020 07:00  --------------------------------------------------------  IN:    Continuous Bladder Irrigation: 6000 mL  Total IN: 6000 mL    OUT:    Continuous Bladder Irrigation: 52563 mL  Total OUT: 67805 mL    Total NET: -5000 mL      14 May 2020 07:01  -  14 May 2020 09:28  --------------------------------------------------------  IN:  Total IN: 0 mL    OUT:    Continuous Bladder Irrigation: 1000 mL UO clear  Total OUT: 1000 mL    Total NET: -1000 mL    LABS:                        7.9    11.46 )-----------( 207      ( 14 May 2020 06:04 )             24.2             05-14    138  |  106  |  43<H>  ----------------------------<  110<H>  3.7   |  23  |  1.93<H>    Ca    8.5      14 May 2020 06:04  Phos  3.4     05-13  Mg     2.1     05-13    TPro  6.2  /  Alb  3.0<L>  /  TBili  0.3  /  DBili  x   /  AST  46<H>  /  ALT  25  /  AlkPhos  78  05-14

## 2020-05-14 NOTE — CHART NOTE - NSCHARTNOTEFT_GEN_A_CORE
Patient was noted to have a n increase in troponin from 1.13 --> 1.9 and finally stable at 1.9. Patient not placed on heparin drip due to hematuria that is being addressed by Urology.     Throughout this time patient was asymptomatic and EKG revealed NSR.     Later in the evening writer was paged for suspected Pre-syncope in the patient prior to receiving an additional EKG. Patient stated he was dizzy and didn't fall, but was light headed.     Patient had normoglycemia, normotensive, with 93% saturation. Baseline mental status. Patient reported he wanted to have a bowel movement and drink some water and felt fine. EKG was unremarkable and showed NSR.     Primary team to follow up in the am.

## 2020-05-14 NOTE — PROGRESS NOTE ADULT - SUBJECTIVE AND OBJECTIVE BOX
PGY 1 Note discussed with supervising resident and primary attending    Patient is a 87y old  Male who presents with a chief complaint of Hematuria (14 May 2020 09:27)      INTERVAL HPI/OVERNIGHT EVENTS: offers no new complaints; current symptoms resolving    MEDICATIONS  (STANDING):  artificial  tears Solution 1 Drop(s) Both EYES three times a day  ATENolol  Tablet 150 milliGRAM(s) Oral daily  atorvastatin 40 milliGRAM(s) Oral at bedtime  cefTRIAXone   IVPB 1000 milliGRAM(s) IV Intermittent every 24 hours  cholecalciferol 1000 Unit(s) Oral daily  clopidogrel Tablet 75 milliGRAM(s) Oral daily  ferrous    sulfate 325 milliGRAM(s) Oral daily  folic acid 1 milliGRAM(s) Oral daily  isosorbide   dinitrate Tablet (ISORDIL) 10 milliGRAM(s) Oral three times a day  levothyroxine 112 MICROGram(s) Oral daily  lidocaine   Patch 1 Patch Transdermal daily  losartan 100 milliGRAM(s) Oral daily  senna 2 Tablet(s) Oral at bedtime  sodium chloride 0.9%. 1000 milliLiter(s) (75 mL/Hr) IV Continuous <Continuous>    MEDICATIONS  (PRN):  acetaminophen   Tablet .. 650 milliGRAM(s) Oral every 6 hours PRN Temp greater or equal to 38C (100.4F), Mild Pain (1 - 3)  ondansetron Injectable 4 milliGRAM(s) IV Push every 8 hours PRN Nausea and/or Vomiting  traMADol 25 milliGRAM(s) Oral every 8 hours PRN Moderate Pain (4 - 6)      __________________________________________________  REVIEW OF SYSTEMS:    CONSTITUTIONAL: No fever,   EYES: no acute visual disturbances  NECK: No pain or stiffness  RESPIRATORY: No cough; No shortness of breath  CARDIOVASCULAR: No chest pain, no palpitations  GASTROINTESTINAL: No pain. No nausea or vomiting; No diarrhea   NEUROLOGICAL: No headache or numbness, no tremors  MUSCULOSKELETAL: No joint pain, no muscle pain  GENITOURINARY: no dysuria, no frequency, no hesitancy  PSYCHIATRY: no depression , no anxiety  ALL OTHER  ROS negative        Vital Signs Last 24 Hrs  T(C): 36.4 (14 May 2020 07:47), Max: 37 (14 May 2020 04:43)  T(F): 97.5 (14 May 2020 07:47), Max: 98.6 (14 May 2020 04:43)  HR: 56 (14 May 2020 07:47) (53 - 62)  BP: 102/36 (14 May 2020 07:47) (92/44 - 138/68)  BP(mean): --  RR: 18 (14 May 2020 07:47) (17 - 20)  SpO2: 97% (14 May 2020 07:47) (95% - 99%)    ________________________________________________  PHYSICAL EXAM:  GENERAL: NAD  HEENT: Normocephalic;  conjunctivae and sclerae clear; moist mucous membranes;   NECK : supple  CHEST/LUNG: Clear to auscultation bilaterally with good air entry   HEART: S1 S2  regular; no murmurs, gallops or rubs  ABDOMEN: Soft, Nontender, Nondistended; Bowel sounds present  EXTREMITIES: no cyanosis; no edema; no calf tenderness  SKIN: warm and dry; no rash  NERVOUS SYSTEM:  Awake and alert; Oriented  to place, person and time ; no new deficits    _________________________________________________  LABS:                        7.9    11.46 )-----------( 207      ( 14 May 2020 06:04 )             24.2     05-14    138  |  106  |  43<H>  ----------------------------<  110<H>  3.7   |  23  |  1.93<H>    Ca    8.5      14 May 2020 06:04  Phos  3.4     05-13  Mg     2.1     05-13    TPro  6.2  /  Alb  3.0<L>  /  TBili  0.3  /  DBili  x   /  AST  46<H>  /  ALT  25  /  AlkPhos  78  05-14    PT/INR - ( 12 May 2020 12:45 )   PT: 13.2 sec;   INR: 1.16 ratio         PTT - ( 12 May 2020 12:45 )  PTT:30.1 sec    CAPILLARY BLOOD GLUCOSE            RADIOLOGY & ADDITIONAL TESTS:    Imaging Personally Reviewed:  YES/NO    Consultant(s) Notes Reviewed:   YES/ No    Care Discussed with Consultants :     Plan of care was discussed with patient and /or primary care giver; all questions and concerns were addressed and care was aligned with patient's wishes. PGY 1 Note discussed with supervising resident and primary attending    Patient is a 87y old  Male who presents with a chief complaint of Hematuria (14 May 2020 09:27)      INTERVAL HPI/OVERNIGHT EVENTS:   - Episode of presyncope overnight, was feeling giddy on exertion. Likely symptomatic anemia  - CBI clamped yesterday as per urology. Urine red tinged, however no gross blood.   - Chest pain remains intermittent. No SOB, on NC. No abdominal pain.  - Plan for transfer for cardiac cath tomorrow.    MEDICATIONS  (STANDING):  artificial  tears Solution 1 Drop(s) Both EYES three times a day  ATENolol  Tablet 150 milliGRAM(s) Oral daily  atorvastatin 40 milliGRAM(s) Oral at bedtime  cefTRIAXone   IVPB 1000 milliGRAM(s) IV Intermittent every 24 hours  cholecalciferol 1000 Unit(s) Oral daily  clopidogrel Tablet 75 milliGRAM(s) Oral daily  ferrous    sulfate 325 milliGRAM(s) Oral daily  folic acid 1 milliGRAM(s) Oral daily  isosorbide   dinitrate Tablet (ISORDIL) 10 milliGRAM(s) Oral three times a day  levothyroxine 112 MICROGram(s) Oral daily  lidocaine   Patch 1 Patch Transdermal daily  losartan 100 milliGRAM(s) Oral daily  senna 2 Tablet(s) Oral at bedtime  sodium chloride 0.9%. 1000 milliLiter(s) (75 mL/Hr) IV Continuous <Continuous>    MEDICATIONS  (PRN):  acetaminophen   Tablet .. 650 milliGRAM(s) Oral every 6 hours PRN Temp greater or equal to 38C (100.4F), Mild Pain (1 - 3)  ondansetron Injectable 4 milliGRAM(s) IV Push every 8 hours PRN Nausea and/or Vomiting  traMADol 25 milliGRAM(s) Oral every 8 hours PRN Moderate Pain (4 - 6)      __________________________________________________  REVIEW OF SYSTEMS:    Negative except as per HPI  ALL OTHER  ROS negative        Vital Signs Last 24 Hrs  T(C): 36.4 (14 May 2020 07:47), Max: 37 (14 May 2020 04:43)  T(F): 97.5 (14 May 2020 07:47), Max: 98.6 (14 May 2020 04:43)  HR: 56 (14 May 2020 07:47) (53 - 62)  BP: 102/36 (14 May 2020 07:47) (92/44 - 138/68)  BP(mean): --  RR: 18 (14 May 2020 07:47) (17 - 20)  SpO2: 97% (14 May 2020 07:47) (95% - 99%)    ________________________________________________  PHYSICAL EXAM:  GENERAL: NAD. Observed resting comfortably in bed, having breakfast appears pale, fatigued.  HEENT: Normocephalic;  conjunctivae and sclerae clear; moist mucous membranes;   NECK : supple  CHEST/LUNG: Clear to auscultation bilaterally with good air entry   HEART: S1 S2  regular; no murmurs, gallops or rubs  ABDOMEN: Soft, Nontender, Nondistended; Bowel sounds present  : Infante in place  EXTREMITIES: no cyanosis; no edema; no calf tenderness  SKIN: warm and dry; no rash  NERVOUS SYSTEM:  Awake and alert; Oriented  to place, person and time ; no new deficits    _________________________________________________  LABS:                        7.9    11.46 )-----------( 207      ( 14 May 2020 06:04 )             24.2     05-14    138  |  106  |  43<H>  ----------------------------<  110<H>  3.7   |  23  |  1.93<H>    Ca    8.5      14 May 2020 06:04  Phos  3.4     05-13  Mg     2.1     05-13    TPro  6.2  /  Alb  3.0<L>  /  TBili  0.3  /  DBili  x   /  AST  46<H>  /  ALT  25  /  AlkPhos  78  05-14    PT/INR - ( 12 May 2020 12:45 )   PT: 13.2 sec;   INR: 1.16 ratio         PTT - ( 12 May 2020 12:45 )  PTT:30.1 sec    CAPILLARY BLOOD GLUCOSE            RADIOLOGY & ADDITIONAL TESTS:    Imaging Personally Reviewed:  YES    Consultant(s) Notes Reviewed:   YES    Care Discussed with Consultants :     Plan of care was discussed with patient and /or primary care giver; all questions and concerns were addressed and care was aligned with patient's wishes.

## 2020-05-15 LAB
ALBUMIN SERPL ELPH-MCNC: 2.6 G/DL — LOW (ref 3.5–5)
ALP SERPL-CCNC: 66 U/L — SIGNIFICANT CHANGE UP (ref 40–120)
ALT FLD-CCNC: 27 U/L DA — SIGNIFICANT CHANGE UP (ref 10–60)
ANION GAP SERPL CALC-SCNC: 7 MMOL/L — SIGNIFICANT CHANGE UP (ref 5–17)
AST SERPL-CCNC: 47 U/L — HIGH (ref 10–40)
BILIRUB SERPL-MCNC: 0.3 MG/DL — SIGNIFICANT CHANGE UP (ref 0.2–1.2)
BLD GP AB SCN SERPL QL: SIGNIFICANT CHANGE UP
BUN SERPL-MCNC: 44 MG/DL — HIGH (ref 7–18)
CALCIUM SERPL-MCNC: 8 MG/DL — LOW (ref 8.4–10.5)
CHLORIDE SERPL-SCNC: 108 MMOL/L — SIGNIFICANT CHANGE UP (ref 96–108)
CO2 SERPL-SCNC: 24 MMOL/L — SIGNIFICANT CHANGE UP (ref 22–31)
CREAT SERPL-MCNC: 1.87 MG/DL — HIGH (ref 0.5–1.3)
GLUCOSE SERPL-MCNC: 141 MG/DL — HIGH (ref 70–99)
HCT VFR BLD CALC: 25.9 % — LOW (ref 39–50)
HCT VFR BLD CALC: 27.7 % — LOW (ref 39–50)
HCT VFR BLD CALC: 28.3 % — LOW (ref 39–50)
HGB BLD-MCNC: 9 G/DL — LOW (ref 13–17)
HGB BLD-MCNC: 9.4 G/DL — LOW (ref 13–17)
HGB BLD-MCNC: 9.8 G/DL — LOW (ref 13–17)
MCHC RBC-ENTMCNC: 31.9 PG — SIGNIFICANT CHANGE UP (ref 27–34)
MCHC RBC-ENTMCNC: 32.5 PG — SIGNIFICANT CHANGE UP (ref 27–34)
MCHC RBC-ENTMCNC: 32.5 PG — SIGNIFICANT CHANGE UP (ref 27–34)
MCHC RBC-ENTMCNC: 33.9 GM/DL — SIGNIFICANT CHANGE UP (ref 32–36)
MCHC RBC-ENTMCNC: 34.6 GM/DL — SIGNIFICANT CHANGE UP (ref 32–36)
MCHC RBC-ENTMCNC: 34.7 GM/DL — SIGNIFICANT CHANGE UP (ref 32–36)
MCV RBC AUTO: 93.5 FL — SIGNIFICANT CHANGE UP (ref 80–100)
MCV RBC AUTO: 93.7 FL — SIGNIFICANT CHANGE UP (ref 80–100)
MCV RBC AUTO: 93.9 FL — SIGNIFICANT CHANGE UP (ref 80–100)
NRBC # BLD: 0 /100 WBCS — SIGNIFICANT CHANGE UP (ref 0–0)
PLATELET # BLD AUTO: 166 K/UL — SIGNIFICANT CHANGE UP (ref 150–400)
PLATELET # BLD AUTO: 168 K/UL — SIGNIFICANT CHANGE UP (ref 150–400)
PLATELET # BLD AUTO: 189 K/UL — SIGNIFICANT CHANGE UP (ref 150–400)
POTASSIUM SERPL-MCNC: 3.6 MMOL/L — SIGNIFICANT CHANGE UP (ref 3.5–5.3)
POTASSIUM SERPL-SCNC: 3.6 MMOL/L — SIGNIFICANT CHANGE UP (ref 3.5–5.3)
PROT SERPL-MCNC: 5.5 G/DL — LOW (ref 6–8.3)
RBC # BLD: 2.77 M/UL — LOW (ref 4.2–5.8)
RBC # BLD: 2.95 M/UL — LOW (ref 4.2–5.8)
RBC # BLD: 3.02 M/UL — LOW (ref 4.2–5.8)
RBC # FLD: 13.8 % — SIGNIFICANT CHANGE UP (ref 10.3–14.5)
RBC # FLD: 13.8 % — SIGNIFICANT CHANGE UP (ref 10.3–14.5)
RBC # FLD: 14.2 % — SIGNIFICANT CHANGE UP (ref 10.3–14.5)
SARS-COV-2 RNA SPEC QL NAA+PROBE: SIGNIFICANT CHANGE UP
SODIUM SERPL-SCNC: 139 MMOL/L — SIGNIFICANT CHANGE UP (ref 135–145)
WBC # BLD: 10.08 K/UL — SIGNIFICANT CHANGE UP (ref 3.8–10.5)
WBC # BLD: 9.45 K/UL — SIGNIFICANT CHANGE UP (ref 3.8–10.5)
WBC # BLD: 9.77 K/UL — SIGNIFICANT CHANGE UP (ref 3.8–10.5)
WBC # FLD AUTO: 10.08 K/UL — SIGNIFICANT CHANGE UP (ref 3.8–10.5)
WBC # FLD AUTO: 9.45 K/UL — SIGNIFICANT CHANGE UP (ref 3.8–10.5)
WBC # FLD AUTO: 9.77 K/UL — SIGNIFICANT CHANGE UP (ref 3.8–10.5)

## 2020-05-15 PROCEDURE — 99232 SBSQ HOSP IP/OBS MODERATE 35: CPT

## 2020-05-15 PROCEDURE — 51700 IRRIGATION OF BLADDER: CPT

## 2020-05-15 RX ORDER — TRAMADOL HYDROCHLORIDE 50 MG/1
25 TABLET ORAL ONCE
Refills: 0 | Status: DISCONTINUED | OUTPATIENT
Start: 2020-05-15 | End: 2020-05-15

## 2020-05-15 RX ORDER — OXYCODONE AND ACETAMINOPHEN 5; 325 MG/1; MG/1
1 TABLET ORAL ONCE
Refills: 0 | Status: DISCONTINUED | OUTPATIENT
Start: 2020-05-15 | End: 2020-05-15

## 2020-05-15 RX ORDER — ATENOLOL 25 MG/1
100 TABLET ORAL DAILY
Refills: 0 | Status: DISCONTINUED | OUTPATIENT
Start: 2020-05-15 | End: 2020-05-18

## 2020-05-15 RX ADMIN — TRAMADOL HYDROCHLORIDE 25 MILLIGRAM(S): 50 TABLET ORAL at 02:52

## 2020-05-15 RX ADMIN — SENNA PLUS 2 TABLET(S): 8.6 TABLET ORAL at 22:04

## 2020-05-15 RX ADMIN — ATENOLOL 150 MILLIGRAM(S): 25 TABLET ORAL at 05:22

## 2020-05-15 RX ADMIN — Medication 1 DROP(S): at 05:22

## 2020-05-15 RX ADMIN — ISOSORBIDE DINITRATE 10 MILLIGRAM(S): 5 TABLET ORAL at 22:04

## 2020-05-15 RX ADMIN — ISOSORBIDE DINITRATE 10 MILLIGRAM(S): 5 TABLET ORAL at 05:22

## 2020-05-15 RX ADMIN — ISOSORBIDE DINITRATE 10 MILLIGRAM(S): 5 TABLET ORAL at 13:23

## 2020-05-15 RX ADMIN — Medication 325 MILLIGRAM(S): at 11:34

## 2020-05-15 RX ADMIN — Medication 112 MICROGRAM(S): at 05:22

## 2020-05-15 RX ADMIN — Medication 1000 UNIT(S): at 11:34

## 2020-05-15 RX ADMIN — Medication 81 MILLIGRAM(S): at 11:34

## 2020-05-15 RX ADMIN — CEFTRIAXONE 100 MILLIGRAM(S): 500 INJECTION, POWDER, FOR SOLUTION INTRAMUSCULAR; INTRAVENOUS at 17:14

## 2020-05-15 RX ADMIN — Medication 650 MILLIGRAM(S): at 06:07

## 2020-05-15 RX ADMIN — TRAMADOL HYDROCHLORIDE 25 MILLIGRAM(S): 50 TABLET ORAL at 23:40

## 2020-05-15 RX ADMIN — Medication 1 DROP(S): at 22:04

## 2020-05-15 RX ADMIN — TRAMADOL HYDROCHLORIDE 25 MILLIGRAM(S): 50 TABLET ORAL at 06:53

## 2020-05-15 RX ADMIN — Medication 1 MILLIGRAM(S): at 11:34

## 2020-05-15 RX ADMIN — ATORVASTATIN CALCIUM 40 MILLIGRAM(S): 80 TABLET, FILM COATED ORAL at 22:04

## 2020-05-15 RX ADMIN — Medication 1 DROP(S): at 13:23

## 2020-05-15 RX ADMIN — LIDOCAINE 1 PATCH: 4 CREAM TOPICAL at 11:34

## 2020-05-15 NOTE — PROGRESS NOTE ADULT - SUBJECTIVE AND OBJECTIVE BOX
HPI:  86 yo Male from Home w/ PMHx of CAD s/p stent ([id number]), HTN, Hypothyroid, Neurogenic Bladder and BPH with chronic Acevedo, DM, CKD and PSHx Green Light PVP 2010, presented to ED w/ hematuria since yesterday. He c/o Chest pain, palpitations. It is intermittent, on and off, aggravated with exertion and relieved with rest. His Cardiologist is Dr Cunha [phone].  Pt currently under care of Dr [facility], pt w/ chronic acevedo, states acevedo catheter is being exchanged every month, last acevedo catheter change was 2 weeks ago; pt admits to previous hx of hematuria, 6 months ago was seen at [facility] w/ hematuria;   [facility] full code (12 May 2020 15:28)      Patient is a 87y old  Male who presents with a chief complaint of Hematuria (15 May 2020 15:06)      INTERVAL HPI/OVERNIGHT EVENTS:  T(C): 36.2 (05-15-20 @ 16:00), Max: 37.5 (05-15-20 @ 08:04)  HR: 48 (05-15-20 @ 16:00) (48 - 92)  BP: 92/50 (05-15-20 @ 16:00) (91/50 - 147/60)  RR: 18 (05-15-20 @ 16:00) (17 - 19)  SpO2: 99% (05-15-20 @ 16:00) (97% - 100%)  Wt(kg): --  I&O's Summary    14 May 2020 07:01  -  15 May 2020 07:00  --------------------------------------------------------  IN: 32664 mL / OUT: 86417 mL / NET: 3605 mL    15 May 2020 07:01  -  15 May 2020 17:26  --------------------------------------------------------  IN: 0 mL / OUT: 36558 mL / NET: -50471 mL        REVIEW OF SYSTEMS: denies fever, chills, SOB, palpitations, chest pain, abdominal pain, nausea, vomitting, diarrhea, constipation, dizziness    MEDICATIONS  (STANDING):  artificial  tears Solution 1 Drop(s) Both EYES three times a day  aspirin enteric coated 81 milliGRAM(s) Oral daily  ATENolol  Tablet 100 milliGRAM(s) Oral daily  atorvastatin 40 milliGRAM(s) Oral at bedtime  cefTRIAXone   IVPB 1000 milliGRAM(s) IV Intermittent every 24 hours  cholecalciferol 1000 Unit(s) Oral daily  ferrous    sulfate 325 milliGRAM(s) Oral daily  folic acid 1 milliGRAM(s) Oral daily  isosorbide   dinitrate Tablet (ISORDIL) 10 milliGRAM(s) Oral three times a day  levothyroxine 112 MICROGram(s) Oral daily  lidocaine   Patch 1 Patch Transdermal daily  senna 2 Tablet(s) Oral at bedtime    MEDICATIONS  (PRN):  acetaminophen   Tablet .. 650 milliGRAM(s) Oral every 6 hours PRN Temp greater or equal to 38C (100.4F), Mild Pain (1 - 3)  ondansetron Injectable 4 milliGRAM(s) IV Push every 8 hours PRN Nausea and/or Vomiting  traMADol 25 milliGRAM(s) Oral every 8 hours PRN Moderate Pain (4 - 6)      PHYSICAL EXAM:  GENERAL: NAD, well-groomed, well-developed  HEAD:  Atraumatic, Normocephalic  EYES: EOMI, PERRLA, conjunctiva and sclera clear  ENMT: No tonsillar erythema, exudates, or enlargement; Moist mucous membranes, Good dentition, No lesions  NECK: Supple, No JVD, Normal thyroid  NERVOUS SYSTEM:  Alert & Oriented X3, Good concentration; Motor Strength 5/5 B/L upper and lower extremities; DTRs 2+ intact and symmetric  CHEST/LUNG: Clear to percussion bilaterally; No rales, rhonchi, wheezing, or rubs  HEART: Regular rate and rhythm; No murmurs, rubs, or gallops  ABDOMEN: Soft, Nontender, Nondistended; Bowel sounds present  EXTREMITIES:  2+ Peripheral Pulses, No clubbing, cyanosis, or edema  LYMPH: No lymphadenopathy noted  SKIN: No rashes or lesions  LABS:                        9.8    10.08 )-----------( 189      ( 15 May 2020 14:11 )             28.3     05-15    139  |  108  |  44<H>  ----------------------------<  141<H>  3.6   |  24  |  1.87<H>    Ca    8.0<L>      15 May 2020 05:45    TPro  5.5<L>  /  Alb  2.6<L>  /  TBili  0.3  /  DBili  x   /  AST  47<H>  /  ALT  27  /  AlkPhos  66  05-15        CAPILLARY BLOOD GLUCOSE

## 2020-05-15 NOTE — PROGRESS NOTE ADULT - SUBJECTIVE AND OBJECTIVE BOX
Subjective:87yMale with hematuria      Vital Signs Last 24 Hrs  T(C): 36.3 (15 May 2020 11:53), Max: 37.5 (15 May 2020 08:04)  T(F): 97.4 (15 May 2020 11:53), Max: 99.5 (15 May 2020 08:04)  HR: 50 (15 May 2020 11:53) (50 - 92)  BP: 130/44 (15 May 2020 11:53) (91/50 - 147/60)  BP(mean): --  RR: 18 (15 May 2020 11:53) (17 - 19)  SpO2: 100% (15 May 2020 11:53) (97% - 100%)  I&O's Detail    14 May 2020 07:01  -  15 May 2020 07:00  --------------------------------------------------------  IN:    Continuous Bladder Irrigation: 07979 mL  Total IN: 42715 mL    OUT:    Continuous Bladder Irrigation: 05276 mL    Voided: 210 mL  Total OUT: 79088 mL    Total NET: 3605 mL      15 May 2020 07:01  -  15 May 2020 15:07  --------------------------------------------------------  IN:  Total IN: 0 mL    OUT:    Continuous Bladder Irrigation: 96651 mL  Total OUT: 58461 mL    Total NET: -26464 mL          Labs:                        9.8    10.08 )-----------( 189      ( 15 May 2020 14:11 )             28.3     05-15    139  |  108  |  44<H>  ----------------------------<  141<H>  3.6   |  24  |  1.87<H>    Ca    8.0<L>      15 May 2020 05:45    TPro  5.5<L>  /  Alb  2.6<L>  /  TBili  0.3  /  DBili  x   /  AST  47<H>  /  ALT  27  /  AlkPhos  66  05-15        Physical Exam:nad  Abdomen:soft nt   acevedo clear on mod cbi

## 2020-05-15 NOTE — PROGRESS NOTE ADULT - SUBJECTIVE AND OBJECTIVE BOX
PGY 1 Note discussed with supervising resident and primary attending    Patient is a 87y old  Male who presents with a chief complaint of Hematuria (15 May 2020 09:37)      INTERVAL HPI/OVERNIGHT EVENTS:   - Was transfused 2 units pRBC yesterday, with stable hgb in the AM  - Episode of urinary retention this AM due to clot in acevedo, evacuated by Surgery  - Patient uncomfortable overnight due to abdominal pain and distension which has now resolved.   - Feels comfortable this AM, Chest pain absent, no SOB, no further abdominal pain, no fever, chills.    MEDICATIONS  (STANDING):  artificial  tears Solution 1 Drop(s) Both EYES three times a day  aspirin enteric coated 81 milliGRAM(s) Oral daily  ATENolol  Tablet 100 milliGRAM(s) Oral daily  atorvastatin 40 milliGRAM(s) Oral at bedtime  cefTRIAXone   IVPB 1000 milliGRAM(s) IV Intermittent every 24 hours  cholecalciferol 1000 Unit(s) Oral daily  ferrous    sulfate 325 milliGRAM(s) Oral daily  folic acid 1 milliGRAM(s) Oral daily  isosorbide   dinitrate Tablet (ISORDIL) 10 milliGRAM(s) Oral three times a day  levothyroxine 112 MICROGram(s) Oral daily  lidocaine   Patch 1 Patch Transdermal daily  senna 2 Tablet(s) Oral at bedtime    MEDICATIONS  (PRN):  acetaminophen   Tablet .. 650 milliGRAM(s) Oral every 6 hours PRN Temp greater or equal to 38C (100.4F), Mild Pain (1 - 3)  ondansetron Injectable 4 milliGRAM(s) IV Push every 8 hours PRN Nausea and/or Vomiting  traMADol 25 milliGRAM(s) Oral every 8 hours PRN Moderate Pain (4 - 6)      __________________________________________________  REVIEW OF SYSTEMS:    Negative except as per ROS  ALL OTHER  ROS negative        Vital Signs Last 24 Hrs  T(C): 37.5 (15 May 2020 08:04), Max: 37.5 (15 May 2020 08:04)  T(F): 99.5 (15 May 2020 08:04), Max: 99.5 (15 May 2020 08:04)  HR: 50 (15 May 2020 08:04) (50 - 92)  BP: 133/57 (15 May 2020 08:04) (91/50 - 147/60)  BP(mean): --  RR: 18 (15 May 2020 08:04) (17 - 20)  SpO2: 100% (15 May 2020 08:04) (97% - 100%)    ________________________________________________  PHYSICAL EXAM:  GENERAL: NAD. Observed resting uncomfortably in bed, appears pale, fatigued.  HEENT: Normocephalic;  conjunctivae and sclerae clear; moist mucous membranes;   NECK : supple  CHEST/LUNG: Clear to auscultation bilaterally with good air entry   HEART: S1 S2  regular; no murmurs, gallops or rubs  ABDOMEN: Soft, Nontender, Nondistended; Bowel sounds present  : Acevedo in place  EXTREMITIES: no cyanosis; no edema; no calf tenderness  SKIN: warm and dry; no rash  NERVOUS SYSTEM:  Awake and alert; Oriented  to place, person and time ; no new deficits  _________________________________________________  LABS:                        9.0    9.77  )-----------( 168      ( 15 May 2020 05:45 )             25.9     05-15    139  |  108  |  44<H>  ----------------------------<  141<H>  3.6   |  24  |  1.87<H>    Ca    8.0<L>      15 May 2020 05:45    TPro  5.5<L>  /  Alb  2.6<L>  /  TBili  0.3  /  DBili  x   /  AST  47<H>  /  ALT  27  /  AlkPhos  66  05-15        CAPILLARY BLOOD GLUCOSE            RADIOLOGY & ADDITIONAL TESTS:    Imaging Personally Reviewed:  YES    Consultant(s) Notes Reviewed:   YES    Care Discussed with Consultants :     Plan of care was discussed with patient and /or primary care giver; all questions and concerns were addressed and care was aligned with patient's wishes. PGY 1 Note discussed with supervising resident and primary attending    Patient is a 87y old  Male who presents with a chief complaint of Hematuria (15 May 2020 09:37)      INTERVAL HPI/OVERNIGHT EVENTS:   - Was transfused 2 units pRBC yesterday, with stable hgb in the AM  - Episode of urinary retention this AM due to clot in acevedo, evacuated by Surgery  - Patient uncomfortable overnight due to abdominal pain and distension which has now resolved.   - Feels comfortable this AM, Chest pain absent, no SOB, no further abdominal pain, no fever, chills.  - Due to persistent hematuria, plans for cath postponed. As per urology, if patient requires cardiac cath, then would require transfer for prostatic artery embolization      MEDICATIONS  (STANDING):  artificial  tears Solution 1 Drop(s) Both EYES three times a day  aspirin enteric coated 81 milliGRAM(s) Oral daily  ATENolol  Tablet 100 milliGRAM(s) Oral daily  atorvastatin 40 milliGRAM(s) Oral at bedtime  cefTRIAXone   IVPB 1000 milliGRAM(s) IV Intermittent every 24 hours  cholecalciferol 1000 Unit(s) Oral daily  ferrous    sulfate 325 milliGRAM(s) Oral daily  folic acid 1 milliGRAM(s) Oral daily  isosorbide   dinitrate Tablet (ISORDIL) 10 milliGRAM(s) Oral three times a day  levothyroxine 112 MICROGram(s) Oral daily  lidocaine   Patch 1 Patch Transdermal daily  senna 2 Tablet(s) Oral at bedtime    MEDICATIONS  (PRN):  acetaminophen   Tablet .. 650 milliGRAM(s) Oral every 6 hours PRN Temp greater or equal to 38C (100.4F), Mild Pain (1 - 3)  ondansetron Injectable 4 milliGRAM(s) IV Push every 8 hours PRN Nausea and/or Vomiting  traMADol 25 milliGRAM(s) Oral every 8 hours PRN Moderate Pain (4 - 6)      __________________________________________________  REVIEW OF SYSTEMS:    Negative except as per ROS  ALL OTHER  ROS negative        Vital Signs Last 24 Hrs  T(C): 37.5 (15 May 2020 08:04), Max: 37.5 (15 May 2020 08:04)  T(F): 99.5 (15 May 2020 08:04), Max: 99.5 (15 May 2020 08:04)  HR: 50 (15 May 2020 08:04) (50 - 92)  BP: 133/57 (15 May 2020 08:04) (91/50 - 147/60)  BP(mean): --  RR: 18 (15 May 2020 08:04) (17 - 20)  SpO2: 100% (15 May 2020 08:04) (97% - 100%)    ________________________________________________  PHYSICAL EXAM:  GENERAL: NAD. Observed resting uncomfortably in bed, appears pale, fatigued.  HEENT: Normocephalic;  conjunctivae and sclerae clear; moist mucous membranes;   NECK : supple  CHEST/LUNG: Clear to auscultation bilaterally with good air entry   HEART: S1 S2  regular; no murmurs, gallops or rubs  ABDOMEN: Soft, Nontender, Nondistended; Bowel sounds present  : Acevedo in place  EXTREMITIES: no cyanosis; no edema; no calf tenderness  SKIN: warm and dry; no rash  NERVOUS SYSTEM:  Awake and alert; Oriented  to place, person and time ; no new deficits  _________________________________________________  LABS:                        9.0    9.77  )-----------( 168      ( 15 May 2020 05:45 )             25.9     05-15    139  |  108  |  44<H>  ----------------------------<  141<H>  3.6   |  24  |  1.87<H>    Ca    8.0<L>      15 May 2020 05:45    TPro  5.5<L>  /  Alb  2.6<L>  /  TBili  0.3  /  DBili  x   /  AST  47<H>  /  ALT  27  /  AlkPhos  66  05-15        CAPILLARY BLOOD GLUCOSE            RADIOLOGY & ADDITIONAL TESTS:    Imaging Personally Reviewed:  YES    Consultant(s) Notes Reviewed:   YES    Care Discussed with Consultants :     Plan of care was discussed with patient and /or primary care giver; all questions and concerns were addressed and care was aligned with patient's wishes.

## 2020-05-15 NOTE — PROGRESS NOTE ADULT - SUBJECTIVE AND OBJECTIVE BOX
DATE OF SERVICE:Patient was seen and examined :05/15/2020    PRESENTING CC:Chest pain    SUBJ: 88 yo Male from Home w/ PMHx of CAD s/p stent ([id number]), HTN, Hypothyroid, Neurogenic Bladder and BPH with chronic Infante, T2DM, CKD and PSHx Green Light PVP 2010, presented to ED w/ hematuria.He c/o Chest pain, palpitations. It is intermittent, on and off, aggravated with exertion and relieved with rest.      PMH -reviewed admission note, no change since admission  Heart failure: acute [ ] chronic [ ] acute or chronic [ ] diastolic [ ] systolic [ ] combined systolic and diastolic[ ]  TERI: ATN[ ] renal medullary necrosis [ ] CKD I [ ]CKDII [ ]CKD III [ ]CKD IV [ ]CKD V [ ]Other pathological lesions [ ]    MEDICATIONS  (STANDING):  artificial  tears Solution 1 Drop(s) Both EYES three times a day  aspirin enteric coated 81 milliGRAM(s) Oral daily  ATENolol  Tablet 150 milliGRAM(s) Oral daily  atorvastatin 40 milliGRAM(s) Oral at bedtime  cefTRIAXone   IVPB 1000 milliGRAM(s) IV Intermittent every 24 hours  cholecalciferol 1000 Unit(s) Oral daily  ferrous    sulfate 325 milliGRAM(s) Oral daily  folic acid 1 milliGRAM(s) Oral daily  isosorbide   dinitrate Tablet (ISORDIL) 10 milliGRAM(s) Oral three times a day  levothyroxine 112 MICROGram(s) Oral daily  lidocaine   Patch 1 Patch Transdermal daily  senna 2 Tablet(s) Oral at bedtime    MEDICATIONS  (PRN):  acetaminophen   Tablet .. 650 milliGRAM(s) Oral every 6 hours PRN Temp greater or equal to 38C (100.4F), Mild Pain (1 - 3)  ondansetron Injectable 4 milliGRAM(s) IV Push every 8 hours PRN Nausea and/or Vomiting  traMADol 25 milliGRAM(s) Oral every 8 hours PRN Moderate Pain (4 - 6)              REVIEW OF SYSTEMS:  Constitutional: [ ] fever, [ ]weight loss,  [ ]fatigue  Eyes: [ ] visual changes  Respiratory: [ ]shortness of breath;  [ ] cough, [ ]wheezing, [ ]chills, [ ]hemoptysis  Cardiovascular: [ ] chest pain, [ ]palpitations, [ ]dizziness,  [ ]leg swelling[ ]orthopnea[ ]PND  Gastrointestinal: [ ] abdominal pain, [ ]nausea, [ ]vomiting,  [ ]diarrhea   Genitourinary: [ ] dysuria, [ ] hematuria  Neurologic: [ ] headaches [ ] tremors[ ]weakness  Skin: [ ] itching, [ ]burning, [ ] rashes  Endocrine: [ ] heat or cold intolerance  Musculoskeletal: [ ] joint pain or swelling; [ ] muscle, back, or extremity pain  Psychiatric: [ ] depression, [ ]anxiety, [ ]mood swings, or [ ]difficulty sleeping  Hematologic: [ ] easy bruising, [ ] bleeding gums    [x] All remaining systems negative except as per above.   [ ]Unable to obtain.    Vital Signs Last 24 Hrs  T(C): 37.5 (15 May 2020 08:04), Max: 37.5 (15 May 2020 08:04)  T(F): 99.5 (15 May 2020 08:04), Max: 99.5 (15 May 2020 08:04)  HR: 50 (15 May 2020 08:04) (50 - 92)  BP: 133/57 (15 May 2020 08:04) (91/50 - 147/60)  BP(mean): --  RR: 18 (15 May 2020 08:04) (17 - 20)  SpO2: 100% (15 May 2020 08:04) (97% - 100%)  I&O's Summary    14 May 2020 07:01  -  15 May 2020 07:00  --------------------------------------------------------  IN: 83855 mL / OUT: 57650 mL / NET: 3605 mL        PHYSICAL EXAM:  General: No acute distress BMI-  HEENT: EOMI, PERRL  Neck: Supple, [ ] JVD  Lungs: Equal air entry bilaterally; [ ] rales [ ] wheezing [ ] rhonchi  Heart: Regular rate and rhythm; [ ] murmur   /6 [ ] systolic [ ] diastolic [ ] radiation[ ] rubs [ ]  gallops  Abdomen: Nontender, bowel sounds present  Extremities: No clubbing, cyanosis, [ ] edema  Nervous system:  Alert & Oriented X3, no focal deficits  Psychiatric: Normal affect  Skin: No rashes or lesions    LABS:  05-15    139  |  108  |  44<H>  ----------------------------<  141<H>  3.6   |  24  |  1.87<H>    Ca    8.0<L>      15 May 2020 05:45    TPro  5.5<L>  /  Alb  2.6<L>  /  TBili  0.3  /  DBili  x   /  AST  47<H>  /  ALT  27  /  AlkPhos  66  05-15    Creatinine Trend: 1.87<--, 1.93<--, 1.87<--, 2.19<--                        9.0    9.77  )-----------( 168      ( 15 May 2020 05:45 )             25.9       Lipid Panel:   Cardiac Enzymes: CARDIAC MARKERS ( 14 May 2020 06:04 )  1.920 ng/mL / x     / x     / x     / x      CARDIAC MARKERS ( 13 May 2020 22:15 )  1.930 ng/mL / x     / x     / x     / x      CARDIAC MARKERS ( 13 May 2020 14:20 )  1.130 ng/mL / x     / 835 U/L / x     / 29.0 ng/mL  CARDIAC MARKERS ( 13 May 2020 09:03 )  0.960 ng/mL / x     / x     / x     / x                RADIOLOGY:    ECG [my interpretation]:    TELEMETRY:    ECHO:    STRESS TEST:    CATHETERIZATION:      IMPRESSION AND PLAN:

## 2020-05-16 ENCOUNTER — TRANSCRIPTION ENCOUNTER (OUTPATIENT)
Age: 85
End: 2020-05-16

## 2020-05-16 LAB
ANION GAP SERPL CALC-SCNC: 7 MMOL/L — SIGNIFICANT CHANGE UP (ref 5–17)
BUN SERPL-MCNC: 38 MG/DL — HIGH (ref 7–18)
CALCIUM SERPL-MCNC: 9 MG/DL — SIGNIFICANT CHANGE UP (ref 8.4–10.5)
CHLORIDE SERPL-SCNC: 107 MMOL/L — SIGNIFICANT CHANGE UP (ref 96–108)
CO2 SERPL-SCNC: 24 MMOL/L — SIGNIFICANT CHANGE UP (ref 22–31)
CREAT SERPL-MCNC: 1.72 MG/DL — HIGH (ref 0.5–1.3)
GLUCOSE SERPL-MCNC: 136 MG/DL — HIGH (ref 70–99)
HCT VFR BLD CALC: 28.3 % — LOW (ref 39–50)
HGB BLD-MCNC: 9.5 G/DL — LOW (ref 13–17)
MCHC RBC-ENTMCNC: 31.8 PG — SIGNIFICANT CHANGE UP (ref 27–34)
MCHC RBC-ENTMCNC: 33.6 GM/DL — SIGNIFICANT CHANGE UP (ref 32–36)
MCV RBC AUTO: 94.6 FL — SIGNIFICANT CHANGE UP (ref 80–100)
NRBC # BLD: 0 /100 WBCS — SIGNIFICANT CHANGE UP (ref 0–0)
PLATELET # BLD AUTO: 215 K/UL — SIGNIFICANT CHANGE UP (ref 150–400)
POTASSIUM SERPL-MCNC: 4.4 MMOL/L — SIGNIFICANT CHANGE UP (ref 3.5–5.3)
POTASSIUM SERPL-SCNC: 4.4 MMOL/L — SIGNIFICANT CHANGE UP (ref 3.5–5.3)
RBC # BLD: 2.99 M/UL — LOW (ref 4.2–5.8)
RBC # FLD: 14.1 % — SIGNIFICANT CHANGE UP (ref 10.3–14.5)
SODIUM SERPL-SCNC: 138 MMOL/L — SIGNIFICANT CHANGE UP (ref 135–145)
WBC # BLD: 11.06 K/UL — HIGH (ref 3.8–10.5)
WBC # FLD AUTO: 11.06 K/UL — HIGH (ref 3.8–10.5)

## 2020-05-16 RX ORDER — LOSARTAN POTASSIUM 100 MG/1
1 TABLET, FILM COATED ORAL
Qty: 0 | Refills: 0 | DISCHARGE

## 2020-05-16 RX ORDER — OXYCODONE AND ACETAMINOPHEN 5; 325 MG/1; MG/1
1 TABLET ORAL ONCE
Refills: 0 | Status: DISCONTINUED | OUTPATIENT
Start: 2020-05-16 | End: 2020-05-16

## 2020-05-16 RX ORDER — ASPIRIN/CALCIUM CARB/MAGNESIUM 324 MG
1 TABLET ORAL
Qty: 0 | Refills: 0 | DISCHARGE
Start: 2020-05-16

## 2020-05-16 RX ORDER — CLOPIDOGREL BISULFATE 75 MG/1
1 TABLET, FILM COATED ORAL
Qty: 0 | Refills: 0 | DISCHARGE

## 2020-05-16 RX ORDER — ISOSORBIDE DINITRATE 5 MG/1
1 TABLET ORAL
Qty: 0 | Refills: 0 | DISCHARGE
Start: 2020-05-16

## 2020-05-16 RX ADMIN — CEFTRIAXONE 100 MILLIGRAM(S): 500 INJECTION, POWDER, FOR SOLUTION INTRAMUSCULAR; INTRAVENOUS at 16:54

## 2020-05-16 RX ADMIN — Medication 1000 UNIT(S): at 12:21

## 2020-05-16 RX ADMIN — ATORVASTATIN CALCIUM 40 MILLIGRAM(S): 80 TABLET, FILM COATED ORAL at 20:17

## 2020-05-16 RX ADMIN — Medication 325 MILLIGRAM(S): at 12:21

## 2020-05-16 RX ADMIN — Medication 112 MICROGRAM(S): at 05:22

## 2020-05-16 RX ADMIN — ISOSORBIDE DINITRATE 10 MILLIGRAM(S): 5 TABLET ORAL at 20:17

## 2020-05-16 RX ADMIN — Medication 81 MILLIGRAM(S): at 12:21

## 2020-05-16 RX ADMIN — Medication 1 DROP(S): at 20:17

## 2020-05-16 RX ADMIN — LIDOCAINE 1 PATCH: 4 CREAM TOPICAL at 19:30

## 2020-05-16 RX ADMIN — Medication 1 DROP(S): at 15:19

## 2020-05-16 RX ADMIN — ISOSORBIDE DINITRATE 10 MILLIGRAM(S): 5 TABLET ORAL at 15:19

## 2020-05-16 RX ADMIN — SENNA PLUS 2 TABLET(S): 8.6 TABLET ORAL at 20:17

## 2020-05-16 RX ADMIN — LIDOCAINE 1 PATCH: 4 CREAM TOPICAL at 12:22

## 2020-05-16 RX ADMIN — Medication 1 DROP(S): at 05:21

## 2020-05-16 RX ADMIN — Medication 1 MILLIGRAM(S): at 12:21

## 2020-05-16 RX ADMIN — ISOSORBIDE DINITRATE 10 MILLIGRAM(S): 5 TABLET ORAL at 05:21

## 2020-05-16 NOTE — DIETITIAN INITIAL EVALUATION ADULT. - OTHER INFO
unable to interview patient face to face as rule out covid-19 . spoke to patient On phone in room. Reports adequate oral intake in-house and PTA. Reports losing 6% from usual in 2 months.

## 2020-05-16 NOTE — CHART NOTE - NSCHARTNOTEFT_GEN_A_CORE
Upon Nutritional Assessment by the Registered Dietitian your patient was determined to meet criteria / has evidence of the following diagnosis/diagnoses:          [ ]  Mild Protein Calorie Malnutrition        [x ]  Moderate Protein Calorie Malnutrition        [ ] Severe Protein Calorie Malnutrition        [ ] Unspecified Protein Calorie Malnutrition        [ ] Underweight / BMI <19        [ ] Morbid Obesity / BMI > 40      Findings as based on:  •  Comprehensive nutrition assessment and consultation  •  Calorie counts (nutrient intake analysis)  •  Food acceptance and intake status from observations by staff  •  Follow up  •  Patient education  •  Intervention secondary to interdisciplinary rounds  •   concerns      Treatment:    The following diet has been recommended:      PROVIDER Section:     By signing this assessment you are acknowledging and agree with the diagnosis/diagnoses assigned by the Registered Dietitian    Comments:  continue with DASH/TLC diet , spoke with RN

## 2020-05-16 NOTE — DISCHARGE NOTE PROVIDER - NSDCMRMEDTOKEN_GEN_ALL_CORE_FT
aspirin 81 mg oral delayed release tablet: 1 tab(s) orally once a day  atenolol 100 mg oral tablet: 1.5 tab(s) orally once a day  atorvastatin 40 mg oral tablet: 1 tab(s) orally once a day  cholecalciferol 1000 intl units (25 mcg) oral tablet: 2 tab(s) orally once a day  ferrous sulfate 325 mg (65 mg elemental iron) oral tablet: 1 tab(s) orally once a day  folic acid 1 mg oral tablet: 1 tab(s) orally once a day  isosorbide dinitrate 10 mg oral tablet: 1 tab(s) orally 3 times a day  levothyroxine 112 mcg (0.112 mg) oral capsule: 1 cap(s) orally once a day

## 2020-05-16 NOTE — PROGRESS NOTE ADULT - SUBJECTIVE AND OBJECTIVE BOX
PGY 1 Note discussed with supervising resident and primary attending    Patient is a 87y old  Male who presents with a chief complaint of Hematuria (15 May 2020 17:26)      INTERVAL HPI/OVERNIGHT EVENTS:   - Uncomfortable, pain in bilateral thighs.  - Infante continues to drain red tinged urine. No shawna hematuria. Hemoglobin stable  - Chest pain resolved since patient does not exert himself.  - Spoke with outpatient urologist, Dr. Rangel. Patient has occassional episodes of hematuria. Cystoscopy performed last year unremarkable except for enlarged prostate.    MEDICATIONS  (STANDING):  artificial  tears Solution 1 Drop(s) Both EYES three times a day  aspirin enteric coated 81 milliGRAM(s) Oral daily  ATENolol  Tablet 100 milliGRAM(s) Oral daily  atorvastatin 40 milliGRAM(s) Oral at bedtime  cefTRIAXone   IVPB 1000 milliGRAM(s) IV Intermittent every 24 hours  cholecalciferol 1000 Unit(s) Oral daily  ferrous    sulfate 325 milliGRAM(s) Oral daily  folic acid 1 milliGRAM(s) Oral daily  isosorbide   dinitrate Tablet (ISORDIL) 10 milliGRAM(s) Oral three times a day  levothyroxine 112 MICROGram(s) Oral daily  lidocaine   Patch 1 Patch Transdermal daily  senna 2 Tablet(s) Oral at bedtime    MEDICATIONS  (PRN):  acetaminophen   Tablet .. 650 milliGRAM(s) Oral every 6 hours PRN Temp greater or equal to 38C (100.4F), Mild Pain (1 - 3)  ondansetron Injectable 4 milliGRAM(s) IV Push every 8 hours PRN Nausea and/or Vomiting  traMADol 25 milliGRAM(s) Oral every 8 hours PRN Moderate Pain (4 - 6)      __________________________________________________  REVIEW OF SYSTEMS:    Negative except as per HPI  ALL OTHER  ROS negative        Vital Signs Last 24 Hrs  T(C): 36.3 (16 May 2020 08:11), Max: 36.4 (15 May 2020 19:16)  T(F): 97.4 (16 May 2020 08:11), Max: 97.5 (15 May 2020 19:16)  HR: 52 (16 May 2020 08:11) (48 - 52)  BP: 114/38 (16 May 2020 08:11) (92/50 - 130/44)  BP(mean): --  RR: 18 (16 May 2020 08:11) (18 - 19)  SpO2: 100% (16 May 2020 08:11) (97% - 100%)    ________________________________________________  PHYSICAL EXAM:  GENERAL: NAD. Observed resting uncomfortably in bed, appears pale, fatigued.  HEENT: Normocephalic;  conjunctivae and sclerae clear; moist mucous membranes;   NECK : supple  CHEST/LUNG: Clear to auscultation bilaterally with good air entry   HEART: S1 S2  regular; no murmurs, gallops or rubs  ABDOMEN: Soft, Nontender, Nondistended; Bowel sounds present  : Infante in place, draining red tinged urine, no shawna hematuria  EXTREMITIES: no cyanosis; no edema; no calf tenderness  SKIN: warm and dry; no rash  NERVOUS SYSTEM:  Awake and alert; Oriented  to place, person and time ; no new deficits    _________________________________________________  LABS:                        9.5    11.06 )-----------( 215      ( 16 May 2020 07:05 )             28.3     05-16    138  |  107  |  38<H>  ----------------------------<  136<H>  4.4   |  24  |  1.72<H>    Ca    9.0      16 May 2020 07:05    TPro  5.5<L>  /  Alb  2.6<L>  /  TBili  0.3  /  DBili  x   /  AST  47<H>  /  ALT  27  /  AlkPhos  66  05-15        CAPILLARY BLOOD GLUCOSE            RADIOLOGY & ADDITIONAL TESTS:    Imaging Personally Reviewed:  YES    Consultant(s) Notes Reviewed:   YES    Care Discussed with Consultants :     Plan of care was discussed with patient and /or primary care giver; all questions and concerns were addressed and care was aligned with patient's wishes.

## 2020-05-16 NOTE — PROGRESS NOTE ADULT - SUBJECTIVE AND OBJECTIVE BOX
INTERVAL HPI/OVERNIGHT EVENTS:    No acute events overnight.   Pt resting comfortably. No acute complaints.   Denies suprapubic pain or pressure      MEDICATIONS  (STANDING):  artificial  tears Solution 1 Drop(s) Both EYES three times a day  aspirin enteric coated 81 milliGRAM(s) Oral daily  ATENolol  Tablet 100 milliGRAM(s) Oral daily  atorvastatin 40 milliGRAM(s) Oral at bedtime  cefTRIAXone   IVPB 1000 milliGRAM(s) IV Intermittent every 24 hours  cholecalciferol 1000 Unit(s) Oral daily  ferrous    sulfate 325 milliGRAM(s) Oral daily  folic acid 1 milliGRAM(s) Oral daily  isosorbide   dinitrate Tablet (ISORDIL) 10 milliGRAM(s) Oral three times a day  levothyroxine 112 MICROGram(s) Oral daily  lidocaine   Patch 1 Patch Transdermal daily  senna 2 Tablet(s) Oral at bedtime    MEDICATIONS  (PRN):  acetaminophen   Tablet .. 650 milliGRAM(s) Oral every 6 hours PRN Temp greater or equal to 38C (100.4F), Mild Pain (1 - 3)  ondansetron Injectable 4 milliGRAM(s) IV Push every 8 hours PRN Nausea and/or Vomiting  traMADol 25 milliGRAM(s) Oral every 8 hours PRN Moderate Pain (4 - 6)      Vital Signs Last 24 Hrs  T(C): 36.6 (16 May 2020 11:05), Max: 36.6 (16 May 2020 11:05)  T(F): 97.8 (16 May 2020 11:05), Max: 97.8 (16 May 2020 11:05)  HR: 54 (16 May 2020 11:05) (48 - 54)  BP: 167/47 (16 May 2020 11:05) (92/50 - 167/47)  BP(mean): --  RR: 18 (16 May 2020 11:05) (18 - 19)  SpO2: 100% (16 May 2020 11:05) (97% - 100%)      PHYSICAL EXAM  General: Alert and oriented, not in acute distress  Resp: Breathing unlabored  Abdomen: Soft, nondistended, nontender  : CBI running, blood tinged acevedo output, flowing well  Extremities: No pedal edema    I&O's Detail    15 May 2020 07:01  -  16 May 2020 07:00  --------------------------------------------------------  IN:    Continuous Bladder Irrigation: 35916 mL  Total IN: 56247 mL    OUT:    Continuous Bladder Irrigation: 36592 mL  Total OUT: 69350 mL    Total NET: -60237 mL      16 May 2020 07:01  -  16 May 2020 12:20  --------------------------------------------------------  IN:    Continuous Bladder Irrigation: 6000 mL  Total IN: 6000 mL    OUT:    Continuous Bladder Irrigation: 9200 mL  Total OUT: 9200 mL    Total NET: -3200 mL          LABS:                        9.5    11.06 )-----------( 215      ( 16 May 2020 07:05 )             28.3             05-16    138  |  107  |  38<H>  ----------------------------<  136<H>  4.4   |  24  |  1.72<H>    Ca    9.0      16 May 2020 07:05    TPro  5.5<L>  /  Alb  2.6<L>  /  TBili  0.3  /  DBili  x   /  AST  47<H>  /  ALT  27  /  AlkPhos  66  05-15

## 2020-05-16 NOTE — DISCHARGE NOTE PROVIDER - NSDCCPCAREPLAN_GEN_ALL_CORE_FT
PRINCIPAL DISCHARGE DIAGNOSIS  Diagnosis: Chest pain  Assessment and Plan of Treatment: You have a history of angina and presented with hematuria and chest pain. Though your chest pain appears to be chronic, you presented with elevated troponins which peaked at 1.93. You are recommended to be transferred for cardiac cath. This is complicated by your hematuria.      SECONDARY DISCHARGE DIAGNOSES  Diagnosis: Hematuria  Assessment and Plan of Treatment: You presented with a chronic acevedo and noted to have shawna hematuria. Urology was consulted and you were placed on continuous bladder irrigation. Your bleeding persisted and you required units of packed red blood cells. At this time you are to be transferred for possible embolization of prostatic artery.    Diagnosis: Hypertension  Assessment and Plan of Treatment: Your blood pressure medications was adjusted during your hospitalization. Losartan was discontinued due to acute kidney injury. Atenolol 150mg daily was decreased to 100mg daily. Please follow up with PCP to inform them of your recent hospitalization.

## 2020-05-16 NOTE — DISCHARGE NOTE PROVIDER - HOSPITAL COURSE
86 yo Male from Home w/ PMHx of CAD s/p stent ([id number]), HTN, Hypothyroid, Neurogenic Bladder and BPH with chronic Acevedo, DM, CKD and PSHx Green Light PVP 2010, presented to ED w/ hematuria since yesterday. He c/o Chest pain, palpitations. It is intermittent, on and off, aggravated with exertion and relieved with rest. His Cardiologist is Dr Cunha [phone].  Pt currently under care of Dr Merchant Urology, pt w/ chronic acevedo, states acevedo catheter is being exchanged every month, last acevedo catheter change was 2 weeks ago; pt admits to previous hx of hematuria, 6 months ago was seen at [facility] w/ hematuria.        Patient to be admitted for evaluation of hematuria and chest pain.        Hematuria:    Patient noted to have clot on CT scan of abdomen on admission. urogloy, Dr. Sim was consulted. Patient was kept on Acevedo, and noted to have episodes of gross hematuria with blood clots. patient was placed on CBI with resolution of hematuria initially. However throughout hospitalization, patient's hematuria was persistent. Patient obtained pRBCs due to acutely lowered hemoglobin. Due to concurrent complication of NSTEMI, urology recommended transfer for embolization of prostatic artery. Patient's outpatient urologist, Dr. Rangel was contacted. Patient has episodes of these clots occasionally. Cystoscopy obtained in the past 1+ year was unremarkable except for enlarged prostate.        NSTEMI    Patient has chronic complaints of angina. On admission T1 was negative, however trops trended up during admission and peaked at 1.93. Cardio, Dr. Weinberg consulted. Recommended cardiac cath, however bleeding must be controlled first. Patient's chest pain remained persistent, however only on exertion. ECG had no changes.        Patient to be transferred for embolization of prostatic artery and for cardiac cath.

## 2020-05-16 NOTE — DIETITIAN INITIAL EVALUATION ADULT. - PERTINENT LABORATORY DATA
05-16 Na138 mmol/L Glu 136 mg/dL<H> K+ 4.4 mmol/L Cr  1.72 mg/dL<H> BUN 38 mg/dL<H> 05-13 Phos 3.4 mg/dL 05-15 Alb 2.6 g/dL<L> 05-13 Chol 117 mg/dL LDL 56 mg/dL HDL 38 mg/dL<L> Trig 115 mg/dL

## 2020-05-16 NOTE — DIETITIAN INITIAL EVALUATION ADULT. - PROBLEM SELECTOR PLAN 1
Pt presnted with chest pain  likely  cardiac,  given Hx of CAD, HTN, Dyslipidemia, Obesity, CVA, AFib  will admit to telemetry to r/o ACS.  EKG- no ST T wave changes.  cardiac enzymes X 1 negative.  will trend T2, T3  f/u ECHO  aspirin is on hold due to hematuria.   Continue with Plavix after weighing Risk vs Benefit   c/w statin and low dose lopressor..   cardio consult- Dr Weinberg

## 2020-05-17 LAB
ANION GAP SERPL CALC-SCNC: 6 MMOL/L — SIGNIFICANT CHANGE UP (ref 5–17)
BLD GP AB SCN SERPL QL: SIGNIFICANT CHANGE UP
BUN SERPL-MCNC: 44 MG/DL — HIGH (ref 7–18)
CALCIUM SERPL-MCNC: 8.4 MG/DL — SIGNIFICANT CHANGE UP (ref 8.4–10.5)
CHLORIDE SERPL-SCNC: 108 MMOL/L — SIGNIFICANT CHANGE UP (ref 96–108)
CO2 SERPL-SCNC: 25 MMOL/L — SIGNIFICANT CHANGE UP (ref 22–31)
CREAT SERPL-MCNC: 1.95 MG/DL — HIGH (ref 0.5–1.3)
CULTURE RESULTS: SIGNIFICANT CHANGE UP
GLUCOSE SERPL-MCNC: 117 MG/DL — HIGH (ref 70–99)
HCT VFR BLD CALC: 20.7 % — CRITICAL LOW (ref 39–50)
HCT VFR BLD CALC: 23 % — LOW (ref 39–50)
HCT VFR BLD CALC: 27.5 % — LOW (ref 39–50)
HGB BLD-MCNC: 7.1 G/DL — LOW (ref 13–17)
HGB BLD-MCNC: 7.5 G/DL — LOW (ref 13–17)
HGB BLD-MCNC: 9.2 G/DL — LOW (ref 13–17)
MCHC RBC-ENTMCNC: 31.3 PG — SIGNIFICANT CHANGE UP (ref 27–34)
MCHC RBC-ENTMCNC: 31.9 PG — SIGNIFICANT CHANGE UP (ref 27–34)
MCHC RBC-ENTMCNC: 32.6 GM/DL — SIGNIFICANT CHANGE UP (ref 32–36)
MCHC RBC-ENTMCNC: 33.3 PG — SIGNIFICANT CHANGE UP (ref 27–34)
MCHC RBC-ENTMCNC: 33.5 GM/DL — SIGNIFICANT CHANGE UP (ref 32–36)
MCHC RBC-ENTMCNC: 34.3 GM/DL — SIGNIFICANT CHANGE UP (ref 32–36)
MCV RBC AUTO: 93.5 FL — SIGNIFICANT CHANGE UP (ref 80–100)
MCV RBC AUTO: 97.2 FL — SIGNIFICANT CHANGE UP (ref 80–100)
MCV RBC AUTO: 97.9 FL — SIGNIFICANT CHANGE UP (ref 80–100)
NRBC # BLD: 0 /100 WBCS — SIGNIFICANT CHANGE UP (ref 0–0)
PLATELET # BLD AUTO: 208 K/UL — SIGNIFICANT CHANGE UP (ref 150–400)
PLATELET # BLD AUTO: 215 K/UL — SIGNIFICANT CHANGE UP (ref 150–400)
PLATELET # BLD AUTO: 241 K/UL — SIGNIFICANT CHANGE UP (ref 150–400)
POTASSIUM SERPL-MCNC: 4.1 MMOL/L — SIGNIFICANT CHANGE UP (ref 3.5–5.3)
POTASSIUM SERPL-SCNC: 4.1 MMOL/L — SIGNIFICANT CHANGE UP (ref 3.5–5.3)
RBC # BLD: 2.13 M/UL — LOW (ref 4.2–5.8)
RBC # BLD: 2.35 M/UL — LOW (ref 4.2–5.8)
RBC # BLD: 2.94 M/UL — LOW (ref 4.2–5.8)
RBC # FLD: 14.4 % — SIGNIFICANT CHANGE UP (ref 10.3–14.5)
RBC # FLD: 14.4 % — SIGNIFICANT CHANGE UP (ref 10.3–14.5)
RBC # FLD: 17.2 % — HIGH (ref 10.3–14.5)
SODIUM SERPL-SCNC: 139 MMOL/L — SIGNIFICANT CHANGE UP (ref 135–145)
SPECIMEN SOURCE: SIGNIFICANT CHANGE UP
WBC # BLD: 11.25 K/UL — HIGH (ref 3.8–10.5)
WBC # BLD: 9.05 K/UL — SIGNIFICANT CHANGE UP (ref 3.8–10.5)
WBC # BLD: 9.74 K/UL — SIGNIFICANT CHANGE UP (ref 3.8–10.5)
WBC # FLD AUTO: 11.25 K/UL — HIGH (ref 3.8–10.5)
WBC # FLD AUTO: 9.05 K/UL — SIGNIFICANT CHANGE UP (ref 3.8–10.5)
WBC # FLD AUTO: 9.74 K/UL — SIGNIFICANT CHANGE UP (ref 3.8–10.5)

## 2020-05-17 PROCEDURE — 99232 SBSQ HOSP IP/OBS MODERATE 35: CPT

## 2020-05-17 RX ADMIN — ISOSORBIDE DINITRATE 10 MILLIGRAM(S): 5 TABLET ORAL at 21:27

## 2020-05-17 RX ADMIN — Medication 1 DROP(S): at 14:15

## 2020-05-17 RX ADMIN — Medication 81 MILLIGRAM(S): at 12:40

## 2020-05-17 RX ADMIN — ISOSORBIDE DINITRATE 10 MILLIGRAM(S): 5 TABLET ORAL at 05:58

## 2020-05-17 RX ADMIN — Medication 1 MILLIGRAM(S): at 12:40

## 2020-05-17 RX ADMIN — Medication 325 MILLIGRAM(S): at 12:40

## 2020-05-17 RX ADMIN — Medication 112 MICROGRAM(S): at 05:58

## 2020-05-17 RX ADMIN — Medication 1 DROP(S): at 05:58

## 2020-05-17 RX ADMIN — Medication 1 DROP(S): at 21:27

## 2020-05-17 RX ADMIN — ATORVASTATIN CALCIUM 40 MILLIGRAM(S): 80 TABLET, FILM COATED ORAL at 21:27

## 2020-05-17 RX ADMIN — SENNA PLUS 2 TABLET(S): 8.6 TABLET ORAL at 21:27

## 2020-05-17 RX ADMIN — LIDOCAINE 1 PATCH: 4 CREAM TOPICAL at 01:04

## 2020-05-17 RX ADMIN — ISOSORBIDE DINITRATE 10 MILLIGRAM(S): 5 TABLET ORAL at 18:32

## 2020-05-17 RX ADMIN — ATENOLOL 100 MILLIGRAM(S): 25 TABLET ORAL at 05:58

## 2020-05-17 RX ADMIN — LIDOCAINE 1 PATCH: 4 CREAM TOPICAL at 12:40

## 2020-05-17 RX ADMIN — Medication 1000 UNIT(S): at 14:04

## 2020-05-17 RX ADMIN — CEFTRIAXONE 100 MILLIGRAM(S): 500 INJECTION, POWDER, FOR SOLUTION INTRAMUSCULAR; INTRAVENOUS at 19:14

## 2020-05-17 RX ADMIN — LIDOCAINE 1 PATCH: 4 CREAM TOPICAL at 20:14

## 2020-05-17 RX ADMIN — Medication 650 MILLIGRAM(S): at 06:46

## 2020-05-17 NOTE — PROGRESS NOTE ADULT - SUBJECTIVE AND OBJECTIVE BOX
INTERVAL HPI/OVERNIGHT EVENTS:    No acute events overnight.   Pt resting comfortably. No acute complaints.   denies suprapubic pain    MEDICATIONS  (STANDING):  artificial  tears Solution 1 Drop(s) Both EYES three times a day  aspirin enteric coated 81 milliGRAM(s) Oral daily  ATENolol  Tablet 100 milliGRAM(s) Oral daily  atorvastatin 40 milliGRAM(s) Oral at bedtime  cefTRIAXone   IVPB 1000 milliGRAM(s) IV Intermittent every 24 hours  cholecalciferol 1000 Unit(s) Oral daily  ferrous    sulfate 325 milliGRAM(s) Oral daily  folic acid 1 milliGRAM(s) Oral daily  isosorbide   dinitrate Tablet (ISORDIL) 10 milliGRAM(s) Oral three times a day  levothyroxine 112 MICROGram(s) Oral daily  lidocaine   Patch 1 Patch Transdermal daily  senna 2 Tablet(s) Oral at bedtime    MEDICATIONS  (PRN):  acetaminophen   Tablet .. 650 milliGRAM(s) Oral every 6 hours PRN Temp greater or equal to 38C (100.4F), Mild Pain (1 - 3)  ondansetron Injectable 4 milliGRAM(s) IV Push every 8 hours PRN Nausea and/or Vomiting  traMADol 25 milliGRAM(s) Oral every 8 hours PRN Moderate Pain (4 - 6)      Vital Signs Last 24 Hrs  T(C): 36.3 (17 May 2020 10:06), Max: 36.6 (17 May 2020 07:48)  T(F): 97.3 (17 May 2020 10:06), Max: 97.8 (17 May 2020 07:48)  HR: 52 (17 May 2020 10:06) (52 - 59)  BP: 120/42 (17 May 2020 10:06) (120/42 - 143/56)  BP(mean): --  RR: 18 (17 May 2020 10:06) (18 - 19)  SpO2: 100% (17 May 2020 10:06) (99% - 100%)      PHYSICAL EXAM  General: Alert and oriented, not in acute distress  Resp: Breathing unlabored  Abdomen: Soft, nondistended, nontender  : acevedo clearing      I&O's Detail    16 May 2020 07:01  -  17 May 2020 07:00  --------------------------------------------------------  IN:    Continuous Bladder Irrigation: 93517 mL    Oral Fluid: 350 mL  Total IN: 99163 mL    OUT:    Continuous Bladder Irrigation: 50860 mL  Total OUT: 39914 mL    Total NET: -2100 mL      17 May 2020 07:01  -  17 May 2020 14:45  --------------------------------------------------------  IN:    Continuous Bladder Irrigation: 9000 mL  Total IN: 9000 mL    OUT:    Continuous Bladder Irrigation: 37660 mL  Total OUT: 32688 mL    Total NET: -1900 mL          LABS:                        7.5    9.74  )-----------( 215      ( 17 May 2020 08:42 )             23.0             05-17    139  |  108  |  44<H>  ----------------------------<  117<H>  4.1   |  25  |  1.95<H>    Ca    8.4      17 May 2020 06:52

## 2020-05-18 LAB
ANION GAP SERPL CALC-SCNC: 7 MMOL/L — SIGNIFICANT CHANGE UP (ref 5–17)
BUN SERPL-MCNC: 39 MG/DL — HIGH (ref 7–18)
CALCIUM SERPL-MCNC: 8.9 MG/DL — SIGNIFICANT CHANGE UP (ref 8.4–10.5)
CHLORIDE SERPL-SCNC: 108 MMOL/L — SIGNIFICANT CHANGE UP (ref 96–108)
CO2 SERPL-SCNC: 24 MMOL/L — SIGNIFICANT CHANGE UP (ref 22–31)
CREAT SERPL-MCNC: 1.59 MG/DL — HIGH (ref 0.5–1.3)
GLUCOSE SERPL-MCNC: 125 MG/DL — HIGH (ref 70–99)
HCT VFR BLD CALC: 26 % — LOW (ref 39–50)
HCT VFR BLD CALC: 27.9 % — LOW (ref 39–50)
HGB BLD-MCNC: 8.7 G/DL — LOW (ref 13–17)
HGB BLD-MCNC: 9.2 G/DL — LOW (ref 13–17)
MCHC RBC-ENTMCNC: 31 PG — SIGNIFICANT CHANGE UP (ref 27–34)
MCHC RBC-ENTMCNC: 31.5 PG — SIGNIFICANT CHANGE UP (ref 27–34)
MCHC RBC-ENTMCNC: 33 GM/DL — SIGNIFICANT CHANGE UP (ref 32–36)
MCHC RBC-ENTMCNC: 33.5 GM/DL — SIGNIFICANT CHANGE UP (ref 32–36)
MCV RBC AUTO: 93.9 FL — SIGNIFICANT CHANGE UP (ref 80–100)
MCV RBC AUTO: 94.2 FL — SIGNIFICANT CHANGE UP (ref 80–100)
NRBC # BLD: 0 /100 WBCS — SIGNIFICANT CHANGE UP (ref 0–0)
NRBC # BLD: 0 /100 WBCS — SIGNIFICANT CHANGE UP (ref 0–0)
OB PNL STL: NEGATIVE — SIGNIFICANT CHANGE UP
PLATELET # BLD AUTO: 218 K/UL — SIGNIFICANT CHANGE UP (ref 150–400)
PLATELET # BLD AUTO: 227 K/UL — SIGNIFICANT CHANGE UP (ref 150–400)
POTASSIUM SERPL-MCNC: 4.7 MMOL/L — SIGNIFICANT CHANGE UP (ref 3.5–5.3)
POTASSIUM SERPL-SCNC: 4.7 MMOL/L — SIGNIFICANT CHANGE UP (ref 3.5–5.3)
RBC # BLD: 2.76 M/UL — LOW (ref 4.2–5.8)
RBC # BLD: 2.97 M/UL — LOW (ref 4.2–5.8)
RBC # FLD: 17.2 % — HIGH (ref 10.3–14.5)
RBC # FLD: 17.4 % — HIGH (ref 10.3–14.5)
SARS-COV-2 RNA SPEC QL NAA+PROBE: SIGNIFICANT CHANGE UP
SODIUM SERPL-SCNC: 139 MMOL/L — SIGNIFICANT CHANGE UP (ref 135–145)
WBC # BLD: 10.08 K/UL — SIGNIFICANT CHANGE UP (ref 3.8–10.5)
WBC # BLD: 15.55 K/UL — HIGH (ref 3.8–10.5)
WBC # FLD AUTO: 10.08 K/UL — SIGNIFICANT CHANGE UP (ref 3.8–10.5)
WBC # FLD AUTO: 15.55 K/UL — HIGH (ref 3.8–10.5)

## 2020-05-18 PROCEDURE — 99232 SBSQ HOSP IP/OBS MODERATE 35: CPT

## 2020-05-18 RX ORDER — ATENOLOL 25 MG/1
100 TABLET ORAL DAILY
Refills: 0 | Status: DISCONTINUED | OUTPATIENT
Start: 2020-05-18 | End: 2020-05-19

## 2020-05-18 RX ORDER — SIMETHICONE 80 MG/1
80 TABLET, CHEWABLE ORAL ONCE
Refills: 0 | Status: COMPLETED | OUTPATIENT
Start: 2020-05-18 | End: 2020-05-18

## 2020-05-18 RX ORDER — ISOSORBIDE DINITRATE 5 MG/1
10 TABLET ORAL THREE TIMES A DAY
Refills: 0 | Status: DISCONTINUED | OUTPATIENT
Start: 2020-05-18 | End: 2020-05-19

## 2020-05-18 RX ORDER — POLYETHYLENE GLYCOL 3350 17 G/17G
17 POWDER, FOR SOLUTION ORAL DAILY
Refills: 0 | Status: DISCONTINUED | OUTPATIENT
Start: 2020-05-18 | End: 2020-05-19

## 2020-05-18 RX ORDER — PANTOPRAZOLE SODIUM 20 MG/1
40 TABLET, DELAYED RELEASE ORAL
Refills: 0 | Status: DISCONTINUED | OUTPATIENT
Start: 2020-05-18 | End: 2020-05-19

## 2020-05-18 RX ORDER — POLYETHYLENE GLYCOL 3350 17 G/17G
17 POWDER, FOR SOLUTION ORAL DAILY
Refills: 0 | Status: DISCONTINUED | OUTPATIENT
Start: 2020-05-18 | End: 2020-05-18

## 2020-05-18 RX ADMIN — Medication 1000 UNIT(S): at 11:07

## 2020-05-18 RX ADMIN — Medication 1 DROP(S): at 05:49

## 2020-05-18 RX ADMIN — ATORVASTATIN CALCIUM 40 MILLIGRAM(S): 80 TABLET, FILM COATED ORAL at 21:41

## 2020-05-18 RX ADMIN — Medication 81 MILLIGRAM(S): at 11:07

## 2020-05-18 RX ADMIN — LIDOCAINE 1 PATCH: 4 CREAM TOPICAL at 11:07

## 2020-05-18 RX ADMIN — Medication 1 MILLIGRAM(S): at 11:07

## 2020-05-18 RX ADMIN — ISOSORBIDE DINITRATE 10 MILLIGRAM(S): 5 TABLET ORAL at 13:05

## 2020-05-18 RX ADMIN — LIDOCAINE 1 PATCH: 4 CREAM TOPICAL at 23:30

## 2020-05-18 RX ADMIN — ATENOLOL 100 MILLIGRAM(S): 25 TABLET ORAL at 12:35

## 2020-05-18 RX ADMIN — Medication 650 MILLIGRAM(S): at 05:50

## 2020-05-18 RX ADMIN — LIDOCAINE 1 PATCH: 4 CREAM TOPICAL at 00:00

## 2020-05-18 RX ADMIN — Medication 112 MICROGRAM(S): at 05:50

## 2020-05-18 RX ADMIN — ISOSORBIDE DINITRATE 10 MILLIGRAM(S): 5 TABLET ORAL at 06:36

## 2020-05-18 RX ADMIN — Medication 325 MILLIGRAM(S): at 11:07

## 2020-05-18 RX ADMIN — SIMETHICONE 80 MILLIGRAM(S): 80 TABLET, CHEWABLE ORAL at 13:24

## 2020-05-18 RX ADMIN — CEFTRIAXONE 100 MILLIGRAM(S): 500 INJECTION, POWDER, FOR SOLUTION INTRAMUSCULAR; INTRAVENOUS at 17:00

## 2020-05-18 RX ADMIN — ISOSORBIDE DINITRATE 10 MILLIGRAM(S): 5 TABLET ORAL at 21:41

## 2020-05-18 RX ADMIN — Medication 1 DROP(S): at 13:06

## 2020-05-18 RX ADMIN — Medication 1 DROP(S): at 21:42

## 2020-05-18 RX ADMIN — LIDOCAINE 1 PATCH: 4 CREAM TOPICAL at 18:03

## 2020-05-18 NOTE — PROGRESS NOTE ADULT - PROBLEM SELECTOR PLAN 6
no DVT ppx due to hematuria
no DVT ppx due to hematuria  On GI prophylaxis - pantop 40mg od
no DVT ppx due to hematuria

## 2020-05-18 NOTE — CHART NOTE - NSCHARTNOTEFT_GEN_A_CORE
paged by RN that pt has chest pain 5/10 intensity. ECG was taken and it didn't show ischemic change. Pt was assessed on the bedside. Pt stated chest pain was dull, and subsided now.  Ordered troponin in AM.  As per chart, pt was planned for cardiac cath, but deferred due to hematuria. Pt received 1U PRBC today and post transfusion HB was 9.2.   Hematuria looks clearing up now. Next CBC is in AM.  Primary team to follow.

## 2020-05-18 NOTE — PROGRESS NOTE ADULT - PROBLEM SELECTOR PROBLEM 3
CAD (Coronary Artery Disease)

## 2020-05-18 NOTE — PROGRESS NOTE ADULT - PROBLEM SELECTOR PLAN 2
Original plans for transfer for cardiac cath, however due to persistent acute blood loss anemia, currently being deferred.  - Plavix dced, on just aspirin.  - If patient requires a cardiac cath, then would need embolization with IR to stop hematuria
Original plans for transfer for cardiac cath, however due to persistent acute blood loss anemia.   No further plans of Angiogram as per Dr. Weinberg. Pt to be managed medically,   - Plavix dced, on just aspirin.  Troponins trended down
Original plans for transfer for cardiac cath, however due to persistent acute blood loss anemia, currently being deferred.  - Plavix dced, on just aspirin.  - If patient requires a cardiac cath, then would need embolization with IR to stop hematuria
Pt presented with chest pain  likely  cardiac,  given Hx of CAD, HTN, Dyslipidemia, Obesity, CVA, AFib   admit to telemetry to r/o ACS.  EKG- no ST T wave changes.  cardiac enzymes X 2 negative overnight then trended up this morning  pt denies chest pain this morning   f/u ECHO  aspirin is on hold due to hematuria.   Continue with Plavix after weighing Risk vs Benefit   c/w statin and low dose lopressor..   cardio consult- Dr Weinberg.
Trops peaked at 1.9, ECG with no changes  Originally scheduled for transfer however will postpone due to unstable H+H  Plan for transfer for cardiac cath in AM

## 2020-05-18 NOTE — PROGRESS NOTE ADULT - PROBLEM SELECTOR PLAN 1
Patient had hematuria yesterday in which CBI was initiated again.  -s/p 2 unit pRBC, hgb now stable  - Infante with red tinged urine this AM.  - Continue with CBI as needed for shawna hematuria
Patient has chronic  hematuria, CBI was initiated again.  -s/p 3 unit pRBC, hgb now stable- 8.7  - Infante with red tinged urine, 2 large blood clots noted after irrigation  - Continue with CBI as needed for shawna hematuria  Urology on board.
CBI clamped yesterday after improvement of gross blood in Infante  Urine red tinged this AM, no shawna blood  - Hgb of 7.9 with symptomatic anemia, will transfuse 1u pRBC. Consent obtained.
In ED CBI is started - improving   slower the rate this morning, uro will f/u pt later today again   see CT A/P as above   3 lumen Infante is exchanged.   f/u Urology   CBC q12  Transfuse if Hb <8   Pain management with Tylenol and Percocet for now.
Patient had hematuria yesterday in which CBI was initiated again.  -s/p 2 unit pRBC yesterday, hgb stable this AM.  - Infante with red tinged urine this AM.  - Continue with CBI

## 2020-05-18 NOTE — PROGRESS NOTE ADULT - PROBLEM SELECTOR PLAN 4
c/w chronic Infante  Outpatient urology follow up  Urology, Dr. Sim on board
c/w chronic Infnate  Outpatient urology follow up  Urology, Dr. Sim on board  Pt has TERI- resolving. 1.9/1.5
c/w Naresh
c/w chronic Infante  Outpatient urology follow up
c/w chronic Infante  Outpatient urology follow up

## 2020-05-18 NOTE — CHART NOTE - NSCHARTNOTEFT_GEN_A_CORE
Pt noted to have an episode of chest pain in the morning today, cardiac enzymes downtrended. Pt will not undergo cardiac cath at present and will continue with medical management until hematuria completely resolves. Spoke with IR but prostate artery embolization are not done in our hospital. Spoke with urology regarding plan of care, clots evaluated earlier, will continue cbi for now and monitor. Urology will update if any plan for intervention.

## 2020-05-18 NOTE — PROGRESS NOTE ADULT - SUBJECTIVE AND OBJECTIVE BOX
HPI:  86 yo Male from Home w/ PMHx of CAD s/p stent ([id number]), HTN, Hypothyroid, Neurogenic Bladder and BPH with chronic Acevedo, DM, CKD and PSHx Green Light PVP 2010, presented to ED w/ hematuria since yesterday. He c/o Chest pain, palpitations. It is intermittent, on and off, aggravated with exertion and relieved with rest. His Cardiologist is Dr Cunha [phone].  Pt currently under care of Dr Merchant [facility], pt w/ chronic acevedo, states acevedo catheter is being exchanged every month, last acevedo catheter change was 2 weeks ago; pt admits to previous hx of hematuria, 6 months ago was seen at [facility] w/ hematuria;   [facility] full code (12 May 2020 15:28)      Patient is a 87y old  Male who presents with a chief complaint of Hematuria (18 May 2020 09:31)      INTERVAL HPI/OVERNIGHT EVENTS:  T(C): 36.4 (05-18-20 @ 11:11), Max: 36.7 (05-17-20 @ 15:28)  HR: 68 (05-18-20 @ 12:31) (50 - 71)  BP: 142/61 (05-18-20 @ 12:31) (99/45 - 172/70)  RR: 17 (05-18-20 @ 11:11) (17 - 18)  SpO2: 98% (05-18-20 @ 11:11) (98% - 100%)  Wt(kg): --  I&O's Summary    17 May 2020 07:01  -  18 May 2020 07:00  --------------------------------------------------------  IN: 10823 mL / OUT: 58271 mL / NET: -5820 mL    18 May 2020 07:01  -  18 May 2020 13:39  --------------------------------------------------------  IN: 27168 mL / OUT: 08191 mL / NET: 150 mL        REVIEW OF SYSTEMS: denies fever, chills, SOB, palpitations, chest pain, abdominal pain, nausea, vomitting, diarrhea, constipation, dizziness    MEDICATIONS  (STANDING):  artificial  tears Solution 1 Drop(s) Both EYES three times a day  aspirin enteric coated 81 milliGRAM(s) Oral daily  ATENolol  Tablet 100 milliGRAM(s) Oral daily  atorvastatin 40 milliGRAM(s) Oral at bedtime  cefTRIAXone   IVPB 1000 milliGRAM(s) IV Intermittent every 24 hours  cholecalciferol 1000 Unit(s) Oral daily  ferrous    sulfate 325 milliGRAM(s) Oral daily  folic acid 1 milliGRAM(s) Oral daily  isosorbide   dinitrate Tablet (ISORDIL) 10 milliGRAM(s) Oral three times a day  levothyroxine 112 MICROGram(s) Oral daily  lidocaine   Patch 1 Patch Transdermal daily  pantoprazole    Tablet 40 milliGRAM(s) Oral before breakfast  senna 2 Tablet(s) Oral at bedtime    MEDICATIONS  (PRN):  acetaminophen   Tablet .. 650 milliGRAM(s) Oral every 6 hours PRN Temp greater or equal to 38C (100.4F), Mild Pain (1 - 3)  ondansetron Injectable 4 milliGRAM(s) IV Push every 8 hours PRN Nausea and/or Vomiting  polyethylene glycol 3350 17 Gram(s) Oral daily PRN Constipation  traMADol 25 milliGRAM(s) Oral every 8 hours PRN Moderate Pain (4 - 6)      PHYSICAL EXAM:  GENERAL: NAD, well-groomed, well-developed  HEAD:  Atraumatic, Normocephalic  EYES: EOMI, PERRLA, conjunctiva and sclera clear  ENMT: No tonsillar erythema, exudates, or enlargement; Moist mucous membranes, Good dentition, No lesions  NECK: Supple, No JVD, Normal thyroid  NERVOUS SYSTEM:  Alert & Oriented X3, Good concentration; Motor Strength 5/5 B/L upper and lower extremities; DTRs 2+ intact and symmetric  CHEST/LUNG: Clear to percussion bilaterally; No rales, rhonchi, wheezing, or rubs  HEART: Regular rate and rhythm; No murmurs, rubs, or gallops  ABDOMEN: Soft, Nontender, Nondistended; Bowel sounds present  EXTREMITIES:  2+ Peripheral Pulses, No clubbing, cyanosis, or edema  LYMPH: No lymphadenopathy noted  SKIN: No rashes or lesions  LABS:                        9.2    15.55 )-----------( 227      ( 18 May 2020 12:35 )             27.9     05-18    139  |  108  |  39<H>  ----------------------------<  125<H>  4.7   |  24  |  1.59<H>    Ca    8.9      18 May 2020 07:04          CAPILLARY BLOOD GLUCOSE

## 2020-05-18 NOTE — PROGRESS NOTE ADULT - SUBJECTIVE AND OBJECTIVE BOX
PGY 1 Note discussed with supervising resident and primary attending    Patient is a 87y old  Male who presents with a chief complaint of Hematuria (17 May 2020 14:45)      INTERVAL HPI/OVERNIGHT EVENTS: Pt mentions feeling overall better. Pt had supra pubic discomfort earlier in am. Bladder irrigation showed 2 large blood clots. Urine noted to be blood tinged. As per Cardiology no plans for any Angiogram. Pt to  be managed medically.     MEDICATIONS  (STANDING):  artificial  tears Solution 1 Drop(s) Both EYES three times a day  aspirin enteric coated 81 milliGRAM(s) Oral daily  ATENolol  Tablet 100 milliGRAM(s) Oral daily  atorvastatin 40 milliGRAM(s) Oral at bedtime  cefTRIAXone   IVPB 1000 milliGRAM(s) IV Intermittent every 24 hours  cholecalciferol 1000 Unit(s) Oral daily  ferrous    sulfate 325 milliGRAM(s) Oral daily  folic acid 1 milliGRAM(s) Oral daily  isosorbide   dinitrate Tablet (ISORDIL) 10 milliGRAM(s) Oral three times a day  levothyroxine 112 MICROGram(s) Oral daily  lidocaine   Patch 1 Patch Transdermal daily  senna 2 Tablet(s) Oral at bedtime    MEDICATIONS  (PRN):  acetaminophen   Tablet .. 650 milliGRAM(s) Oral every 6 hours PRN Temp greater or equal to 38C (100.4F), Mild Pain (1 - 3)  ondansetron Injectable 4 milliGRAM(s) IV Push every 8 hours PRN Nausea and/or Vomiting  polyethylene glycol 3350 17 Gram(s) Oral daily PRN Constipation  traMADol 25 milliGRAM(s) Oral every 8 hours PRN Moderate Pain (4 - 6)      __________________________________________________  REVIEW OF SYSTEMS:    CONSTITUTIONAL: No fever,   EYES: no acute visual disturbances  NECK: No pain or stiffness  RESPIRATORY: No cough; No shortness of breath  CARDIOVASCULAR: No chest pain, no palpitations  GASTROINTESTINAL: abd discomfort resolved.   NEUROLOGICAL: No headache or numbness, no tremors  MUSCULOSKELETAL: No joint pain, no muscle pain  GENITOURINARY: no dysuria, no frequency, no hesitancy  PSYCHIATRY: no depression , no anxiety  ALL OTHER  ROS negative        Vital Signs Last 24 Hrs  T(C): 36.3 (18 May 2020 08:05), Max: 36.7 (17 May 2020 15:28)  T(F): 97.3 (18 May 2020 08:05), Max: 98 (17 May 2020 15:28)  HR: 50 (18 May 2020 08:05) (50 - 71)  BP: 113/40 (18 May 2020 08:05) (99/45 - 172/70)  BP(mean): --  RR: 18 (18 May 2020 08:05) (18 - 18)  SpO2: 99% (18 May 2020 08:05) (99% - 100%)    ________________________________________________  PHYSICAL EXAM:  GENERAL: elderly wll built male sitting comfortably on bed and interacting,   HEENT: Normocephalic;  conjunctivae and sclerae clear; moist mucous membranes;   NECK : supple  CHEST/LUNG: Clear to auscultation bilaterally with good air entry   HEART: S1 S2  regular; no murmurs, gallops or rubs  ABDOMEN: Soft, Nontender, Nondistended; Bowel sounds present. BLOOD TINGED URINE+  EXTREMITIES: no cyanosis; no edema; no calf tenderness  SKIN: warm and dry; no rash  NERVOUS SYSTEM: aaox3	    _________________________________________________  LABS:                        8.7    10.08 )-----------( 218      ( 18 May 2020 07:04 )             26.0     05-18    139  |  108  |  39<H>  ----------------------------<  125<H>  4.7   |  24  |  1.59<H>    Ca    8.9      18 May 2020 07:04          CAPILLARY BLOOD GLUCOSE            RADIOLOGY & ADDITIONAL TESTS:    < from: CT Abdomen and Pelvis No Cont (05.12.20 @ 15:04) >  IMPRESSION:     No urinary tract calculi or hydronephrosis.    Large blood clot in the bladder lumen. Follow-up cystoscopy to exclude an underlying mass.    Markedly Enlarged prostate with intravesical component of BPH.    < end of copied text >    Imaging Personally Reviewed:  YES/NO    Consultant(s) Notes Reviewed:   YES/ No    Care Discussed with Consultants :     Plan of care was discussed with patient and /or primary care giver; all questions and concerns were addressed and care was aligned with patient's wishes.

## 2020-05-18 NOTE — PROGRESS NOTE ADULT - PROBLEM SELECTOR PLAN 8
home when medically stable

## 2020-05-18 NOTE — PROGRESS NOTE ADULT - PROBLEM SELECTOR PROBLEM 4
BPH (Benign Prostatic Hyperplasia)

## 2020-05-18 NOTE — PROGRESS NOTE ADULT - PROBLEM SELECTOR PLAN 5
Pt takes Losartan 100  and Atenolol 150  Losartan Dced due to TERI  Atenolol changed to 100mg   Dr. dumont is following
Pt takes Losartan 100  and Atenolol 150  Losartan Dced due to TERI  Atenolol changed to 100mg   Dr. dumont is following
Pt takes Losartan 100  and Atenolol 150  Losartan Dced due to TERI   Dr. dumont is following
Pt takes Losartan 100  and Atenolol 150  Losartan Dced due to TERI  Atenolol changed to 100mg   Dr. dumont is following
Pt takes Losartan 100  and Atenolol 150  c/w home meds   Dr. udmont is following

## 2020-05-18 NOTE — PROGRESS NOTE ADULT - PROBLEM SELECTOR PLAN 3
Continue with Asprin, plavix dced  Due to persistent hematuria
Continue with Plavix.  Unknown why patient is not on aspirin. Patient was on aspirin in the past, then dced by outpatient provider.
continue with clopidogrel and statin.

## 2020-05-19 ENCOUNTER — INPATIENT (INPATIENT)
Facility: HOSPITAL | Age: 85
LOS: 7 days | Discharge: HOME HEALTH | End: 2020-05-27
Attending: INTERNAL MEDICINE | Admitting: INTERNAL MEDICINE
Payer: MEDICARE

## 2020-05-19 VITALS
RESPIRATION RATE: 18 BRPM | TEMPERATURE: 98 F | SYSTOLIC BLOOD PRESSURE: 106 MMHG | DIASTOLIC BLOOD PRESSURE: 44 MMHG | OXYGEN SATURATION: 97 % | HEART RATE: 54 BPM

## 2020-05-19 VITALS
SYSTOLIC BLOOD PRESSURE: 127 MMHG | OXYGEN SATURATION: 98 % | WEIGHT: 165.35 LBS | RESPIRATION RATE: 16 BRPM | HEIGHT: 67 IN | TEMPERATURE: 98 F | HEART RATE: 64 BPM | DIASTOLIC BLOOD PRESSURE: 52 MMHG

## 2020-05-19 DIAGNOSIS — I24.9 ACUTE ISCHEMIC HEART DISEASE, UNSPECIFIED: ICD-10-CM

## 2020-05-19 LAB
ANION GAP SERPL CALC-SCNC: 10 MMO/L — SIGNIFICANT CHANGE UP (ref 7–14)
ANION GAP SERPL CALC-SCNC: 8 MMOL/L — SIGNIFICANT CHANGE UP (ref 5–17)
APTT BLD: 26.5 SEC — LOW (ref 27.5–36.3)
BLD GP AB SCN SERPL QL: NEGATIVE — SIGNIFICANT CHANGE UP
BUN SERPL-MCNC: 34 MG/DL — HIGH (ref 7–18)
BUN SERPL-MCNC: 37 MG/DL — HIGH (ref 7–23)
C DIFF BY PCR RESULT: SIGNIFICANT CHANGE UP
C DIFF TOX GENS STL QL NAA+PROBE: SIGNIFICANT CHANGE UP
CALCIUM SERPL-MCNC: 8.5 MG/DL — SIGNIFICANT CHANGE UP (ref 8.4–10.5)
CALCIUM SERPL-MCNC: 8.6 MG/DL — SIGNIFICANT CHANGE UP (ref 8.4–10.5)
CHLORIDE SERPL-SCNC: 103 MMOL/L — SIGNIFICANT CHANGE UP (ref 98–107)
CHLORIDE SERPL-SCNC: 107 MMOL/L — SIGNIFICANT CHANGE UP (ref 96–108)
CO2 SERPL-SCNC: 23 MMOL/L — SIGNIFICANT CHANGE UP (ref 22–31)
CO2 SERPL-SCNC: 24 MMOL/L — SIGNIFICANT CHANGE UP (ref 22–31)
CREAT SERPL-MCNC: 1.62 MG/DL — HIGH (ref 0.5–1.3)
CREAT SERPL-MCNC: 1.93 MG/DL — HIGH (ref 0.5–1.3)
GLUCOSE SERPL-MCNC: 107 MG/DL — HIGH (ref 70–99)
GLUCOSE SERPL-MCNC: 194 MG/DL — HIGH (ref 70–99)
HCT VFR BLD CALC: 22.9 % — LOW (ref 39–50)
HCT VFR BLD CALC: 25.9 % — LOW (ref 39–50)
HCT VFR BLD CALC: 26.1 % — LOW (ref 39–50)
HGB BLD-MCNC: 7.7 G/DL — LOW (ref 13–17)
HGB BLD-MCNC: 8.6 G/DL — LOW (ref 13–17)
HGB BLD-MCNC: 8.6 G/DL — LOW (ref 13–17)
INR BLD: 1.14 — SIGNIFICANT CHANGE UP (ref 0.88–1.17)
MAGNESIUM SERPL-MCNC: 2.1 MG/DL — SIGNIFICANT CHANGE UP (ref 1.6–2.6)
MAGNESIUM SERPL-MCNC: 2.2 MG/DL — SIGNIFICANT CHANGE UP (ref 1.6–2.6)
MCHC RBC-ENTMCNC: 31.4 PG — SIGNIFICANT CHANGE UP (ref 27–34)
MCHC RBC-ENTMCNC: 32 PG — SIGNIFICANT CHANGE UP (ref 27–34)
MCHC RBC-ENTMCNC: 32.1 PG — SIGNIFICANT CHANGE UP (ref 27–34)
MCHC RBC-ENTMCNC: 33 GM/DL — SIGNIFICANT CHANGE UP (ref 32–36)
MCHC RBC-ENTMCNC: 33.2 % — SIGNIFICANT CHANGE UP (ref 32–36)
MCHC RBC-ENTMCNC: 33.6 GM/DL — SIGNIFICANT CHANGE UP (ref 32–36)
MCV RBC AUTO: 95.3 FL — SIGNIFICANT CHANGE UP (ref 80–100)
MCV RBC AUTO: 95.4 FL — SIGNIFICANT CHANGE UP (ref 80–100)
MCV RBC AUTO: 96.3 FL — SIGNIFICANT CHANGE UP (ref 80–100)
NRBC # BLD: 0 /100 WBCS — SIGNIFICANT CHANGE UP (ref 0–0)
NRBC # BLD: 0 /100 WBCS — SIGNIFICANT CHANGE UP (ref 0–0)
NRBC # FLD: 0 K/UL — SIGNIFICANT CHANGE UP (ref 0–0)
PHOSPHATE SERPL-MCNC: 3.6 MG/DL — SIGNIFICANT CHANGE UP (ref 2.5–4.5)
PLATELET # BLD AUTO: 198 K/UL — SIGNIFICANT CHANGE UP (ref 150–400)
PLATELET # BLD AUTO: 210 K/UL — SIGNIFICANT CHANGE UP (ref 150–400)
PLATELET # BLD AUTO: 223 K/UL — SIGNIFICANT CHANGE UP (ref 150–400)
PMV BLD: 9.6 FL — SIGNIFICANT CHANGE UP (ref 7–13)
POTASSIUM SERPL-MCNC: 4.3 MMOL/L — SIGNIFICANT CHANGE UP (ref 3.5–5.3)
POTASSIUM SERPL-MCNC: 4.6 MMOL/L — SIGNIFICANT CHANGE UP (ref 3.5–5.3)
POTASSIUM SERPL-SCNC: 4.3 MMOL/L — SIGNIFICANT CHANGE UP (ref 3.5–5.3)
POTASSIUM SERPL-SCNC: 4.6 MMOL/L — SIGNIFICANT CHANGE UP (ref 3.5–5.3)
PROTHROM AB SERPL-ACNC: 13 SEC — SIGNIFICANT CHANGE UP (ref 9.8–13.1)
RBC # BLD: 2.4 M/UL — LOW (ref 4.2–5.8)
RBC # BLD: 2.69 M/UL — LOW (ref 4.2–5.8)
RBC # BLD: 2.74 M/UL — LOW (ref 4.2–5.8)
RBC # FLD: 16.9 % — HIGH (ref 10.3–14.5)
RBC # FLD: 17 % — HIGH (ref 10.3–14.5)
RBC # FLD: 17 % — HIGH (ref 10.3–14.5)
RH IG SCN BLD-IMP: POSITIVE — SIGNIFICANT CHANGE UP
SODIUM SERPL-SCNC: 136 MMOL/L — SIGNIFICANT CHANGE UP (ref 135–145)
SODIUM SERPL-SCNC: 139 MMOL/L — SIGNIFICANT CHANGE UP (ref 135–145)
TROPONIN I SERPL-MCNC: 2.43 NG/ML — HIGH (ref 0–0.04)
WBC # BLD: 10.38 K/UL — SIGNIFICANT CHANGE UP (ref 3.8–10.5)
WBC # BLD: 10.75 K/UL — HIGH (ref 3.8–10.5)
WBC # BLD: 12.15 K/UL — HIGH (ref 3.8–10.5)
WBC # FLD AUTO: 10.38 K/UL — SIGNIFICANT CHANGE UP (ref 3.8–10.5)
WBC # FLD AUTO: 10.75 K/UL — HIGH (ref 3.8–10.5)
WBC # FLD AUTO: 12.15 K/UL — HIGH (ref 3.8–10.5)

## 2020-05-19 PROCEDURE — 93005 ELECTROCARDIOGRAM TRACING: CPT

## 2020-05-19 PROCEDURE — P9040: CPT

## 2020-05-19 PROCEDURE — 99285 EMERGENCY DEPT VISIT HI MDM: CPT

## 2020-05-19 PROCEDURE — 74176 CT ABD & PELVIS W/O CONTRAST: CPT

## 2020-05-19 PROCEDURE — 99232 SBSQ HOSP IP/OBS MODERATE 35: CPT

## 2020-05-19 PROCEDURE — 86923 COMPATIBILITY TEST ELECTRIC: CPT

## 2020-05-19 PROCEDURE — 85730 THROMBOPLASTIN TIME PARTIAL: CPT

## 2020-05-19 PROCEDURE — 84100 ASSAY OF PHOSPHORUS: CPT

## 2020-05-19 PROCEDURE — 82607 VITAMIN B-12: CPT

## 2020-05-19 PROCEDURE — 71045 X-RAY EXAM CHEST 1 VIEW: CPT

## 2020-05-19 PROCEDURE — 86850 RBC ANTIBODY SCREEN: CPT

## 2020-05-19 PROCEDURE — 82550 ASSAY OF CK (CPK): CPT

## 2020-05-19 PROCEDURE — 83880 ASSAY OF NATRIURETIC PEPTIDE: CPT

## 2020-05-19 PROCEDURE — 86901 BLOOD TYPING SEROLOGIC RH(D): CPT

## 2020-05-19 PROCEDURE — 87635 SARS-COV-2 COVID-19 AMP PRB: CPT

## 2020-05-19 PROCEDURE — 83550 IRON BINDING TEST: CPT

## 2020-05-19 PROCEDURE — 87086 URINE CULTURE/COLONY COUNT: CPT

## 2020-05-19 PROCEDURE — 80061 LIPID PANEL: CPT

## 2020-05-19 PROCEDURE — 83036 HEMOGLOBIN GLYCOSYLATED A1C: CPT

## 2020-05-19 PROCEDURE — 93306 TTE W/DOPPLER COMPLETE: CPT

## 2020-05-19 PROCEDURE — 84484 ASSAY OF TROPONIN QUANT: CPT

## 2020-05-19 PROCEDURE — 87493 C DIFF AMPLIFIED PROBE: CPT

## 2020-05-19 PROCEDURE — 85610 PROTHROMBIN TIME: CPT

## 2020-05-19 PROCEDURE — 80053 COMPREHEN METABOLIC PANEL: CPT

## 2020-05-19 PROCEDURE — 83735 ASSAY OF MAGNESIUM: CPT

## 2020-05-19 PROCEDURE — 82962 GLUCOSE BLOOD TEST: CPT

## 2020-05-19 PROCEDURE — 80048 BASIC METABOLIC PNL TOTAL CA: CPT

## 2020-05-19 PROCEDURE — 82553 CREATINE MB FRACTION: CPT

## 2020-05-19 PROCEDURE — 82306 VITAMIN D 25 HYDROXY: CPT

## 2020-05-19 PROCEDURE — 36415 COLL VENOUS BLD VENIPUNCTURE: CPT

## 2020-05-19 PROCEDURE — 84443 ASSAY THYROID STIM HORMONE: CPT

## 2020-05-19 PROCEDURE — 82728 ASSAY OF FERRITIN: CPT

## 2020-05-19 PROCEDURE — 82746 ASSAY OF FOLIC ACID SERUM: CPT

## 2020-05-19 PROCEDURE — 97161 PT EVAL LOW COMPLEX 20 MIN: CPT

## 2020-05-19 PROCEDURE — 83540 ASSAY OF IRON: CPT

## 2020-05-19 PROCEDURE — 85652 RBC SED RATE AUTOMATED: CPT

## 2020-05-19 PROCEDURE — 36430 TRANSFUSION BLD/BLD COMPNT: CPT

## 2020-05-19 PROCEDURE — 86900 BLOOD TYPING SEROLOGIC ABO: CPT

## 2020-05-19 PROCEDURE — 85027 COMPLETE CBC AUTOMATED: CPT

## 2020-05-19 PROCEDURE — U0003: CPT

## 2020-05-19 PROCEDURE — 83690 ASSAY OF LIPASE: CPT

## 2020-05-19 PROCEDURE — 82272 OCCULT BLD FECES 1-3 TESTS: CPT

## 2020-05-19 RX ORDER — ACETAMINOPHEN 500 MG
2 TABLET ORAL
Qty: 0 | Refills: 0 | DISCHARGE
Start: 2020-05-19

## 2020-05-19 RX ORDER — TRAMADOL HYDROCHLORIDE 50 MG/1
0.5 TABLET ORAL
Qty: 0 | Refills: 0 | DISCHARGE
Start: 2020-05-19

## 2020-05-19 RX ORDER — ONDANSETRON 8 MG/1
4 TABLET, FILM COATED ORAL
Qty: 0 | Refills: 0 | DISCHARGE
Start: 2020-05-19

## 2020-05-19 RX ORDER — PANTOPRAZOLE SODIUM 20 MG/1
1 TABLET, DELAYED RELEASE ORAL
Qty: 0 | Refills: 0 | DISCHARGE
Start: 2020-05-19

## 2020-05-19 RX ORDER — INSULIN LISPRO 100/ML
VIAL (ML) SUBCUTANEOUS
Refills: 0 | Status: DISCONTINUED | OUTPATIENT
Start: 2020-05-19 | End: 2020-05-19

## 2020-05-19 RX ORDER — SENNA PLUS 8.6 MG/1
2 TABLET ORAL
Qty: 0 | Refills: 0 | DISCHARGE
Start: 2020-05-19

## 2020-05-19 RX ORDER — ISOSORBIDE DINITRATE 5 MG/1
1 TABLET ORAL
Qty: 0 | Refills: 0 | DISCHARGE
Start: 2020-05-19

## 2020-05-19 RX ORDER — POLYETHYLENE GLYCOL 3350 17 G/17G
17 POWDER, FOR SOLUTION ORAL
Qty: 0 | Refills: 0 | DISCHARGE
Start: 2020-05-19

## 2020-05-19 RX ORDER — CEFTRIAXONE 500 MG/1
1 INJECTION, POWDER, FOR SOLUTION INTRAMUSCULAR; INTRAVENOUS
Qty: 5 | Refills: 0
Start: 2020-05-19 | End: 2020-05-23

## 2020-05-19 RX ADMIN — Medication 1 MILLIGRAM(S): at 11:03

## 2020-05-19 RX ADMIN — CEFTRIAXONE 100 MILLIGRAM(S): 500 INJECTION, POWDER, FOR SOLUTION INTRAMUSCULAR; INTRAVENOUS at 17:12

## 2020-05-19 RX ADMIN — ISOSORBIDE DINITRATE 10 MILLIGRAM(S): 5 TABLET ORAL at 13:26

## 2020-05-19 RX ADMIN — ISOSORBIDE DINITRATE 10 MILLIGRAM(S): 5 TABLET ORAL at 06:07

## 2020-05-19 RX ADMIN — LIDOCAINE 1 PATCH: 4 CREAM TOPICAL at 11:03

## 2020-05-19 RX ADMIN — Medication 81 MILLIGRAM(S): at 11:03

## 2020-05-19 RX ADMIN — Medication 1 DROP(S): at 06:28

## 2020-05-19 RX ADMIN — Medication 1000 UNIT(S): at 11:05

## 2020-05-19 RX ADMIN — Medication 325 MILLIGRAM(S): at 11:14

## 2020-05-19 RX ADMIN — Medication 112 MICROGRAM(S): at 06:07

## 2020-05-19 RX ADMIN — Medication 1 DROP(S): at 13:26

## 2020-05-19 RX ADMIN — PANTOPRAZOLE SODIUM 40 MILLIGRAM(S): 20 TABLET, DELAYED RELEASE ORAL at 06:07

## 2020-05-19 RX ADMIN — ATENOLOL 100 MILLIGRAM(S): 25 TABLET ORAL at 06:07

## 2020-05-19 NOTE — ACUTE INTERFACILITY TRANSFER NOTE - SECONDARY DIAGNOSIS.
Chest pain Hypothyroidism Hypertension CAD (Coronary Artery Disease) BPH (Benign Prostatic Hyperplasia)

## 2020-05-19 NOTE — PROGRESS NOTE ADULT - ATTENDING COMMENTS
Patient was seen and examined by me on 05/19/2020,interim events noted,labs and radiology studies reviewed.  Salvador Weinberg MD,FACC.  [address] [place] [address].  [id number].  [phone]
pt starting having more gross hematuria today   transfused 1 unit  has been on plavix due to elevated trop - cardiac cath delayed due to hematuria   advise cont cbi serial h/h and transfuse as necessary   if hematuria resolves and would like some Ac consider short acting   as bleeding most certainly from large prostate would consider prostatic artery embolization if bleeding continues and prior to any cardiac cath especially if likely to require cont anticoagulation

## 2020-05-19 NOTE — PROGRESS NOTE ADULT - SUBJECTIVE AND OBJECTIVE BOX
HPI:  88 yo Male from Home w/ PMHx of CAD s/p stent ([id number]), HTN, Hypothyroid, Neurogenic Bladder and BPH with chronic Acevedo, DM, CKD and PSHx Green Light PVP 2010, presented to ED w/ hematuria since yesterday. He c/o Chest pain, palpitations. It is intermittent, on and off, aggravated with exertion and relieved with rest. His Cardiologist is Dr Cunha [phone].  Pt currently under care of Dr Merchant [facility], pt w/ chronic acevedo, states acevedo catheter is being exchanged every month, last acevedo catheter change was 2 weeks ago; pt admits to previous hx of hematuria, 6 months ago was seen at [facility] w/ hematuria;   [facility] full code (12 May 2020 15:28)      Patient is a 87y old  Male who presents with a chief complaint of Hematuria (19 May 2020 09:59)      INTERVAL HPI/OVERNIGHT EVENTS:  T(C): 36.7 (05-19-20 @ 11:31), Max: 37.3 (05-18-20 @ 15:32)  HR: 58 (05-19-20 @ 13:26) (56 - 66)  BP: 134/44 (05-19-20 @ 13:26) (100/34 - 134/44)  RR: 18 (05-19-20 @ 11:31) (18 - 20)  SpO2: 99% (05-19-20 @ 11:31) (94% - 99%)  Wt(kg): --  I&O's Summary    18 May 2020 07:01  -  19 May 2020 07:00  --------------------------------------------------------  IN: 89902 mL / OUT: 34552 mL / NET: -175 mL    19 May 2020 07:01  -  19 May 2020 13:30  --------------------------------------------------------  IN: 3000 mL / OUT: 4900 mL / NET: -1900 mL        REVIEW OF SYSTEMS: denies fever, chills, SOB, palpitations, chest pain, abdominal pain, nausea, vomitting, diarrhea, constipation, dizziness    MEDICATIONS  (STANDING):  artificial  tears Solution 1 Drop(s) Both EYES three times a day  aspirin enteric coated 81 milliGRAM(s) Oral daily  ATENolol  Tablet 100 milliGRAM(s) Oral daily  atorvastatin 40 milliGRAM(s) Oral at bedtime  cefTRIAXone   IVPB 1000 milliGRAM(s) IV Intermittent every 24 hours  cholecalciferol 1000 Unit(s) Oral daily  ferrous    sulfate 325 milliGRAM(s) Oral daily  folic acid 1 milliGRAM(s) Oral daily  isosorbide   dinitrate Tablet (ISORDIL) 10 milliGRAM(s) Oral three times a day  levothyroxine 112 MICROGram(s) Oral daily  lidocaine   Patch 1 Patch Transdermal daily  pantoprazole    Tablet 40 milliGRAM(s) Oral before breakfast  senna 2 Tablet(s) Oral at bedtime    MEDICATIONS  (PRN):  acetaminophen   Tablet .. 650 milliGRAM(s) Oral every 6 hours PRN Temp greater or equal to 38C (100.4F), Mild Pain (1 - 3)  ondansetron Injectable 4 milliGRAM(s) IV Push every 8 hours PRN Nausea and/or Vomiting  polyethylene glycol 3350 17 Gram(s) Oral daily PRN Constipation  traMADol 25 milliGRAM(s) Oral every 8 hours PRN Moderate Pain (4 - 6)      PHYSICAL EXAM:  GENERAL: NAD, well-groomed, well-developed  HEAD:  Atraumatic, Normocephalic  EYES: EOMI, PERRLA, conjunctiva and sclera clear  ENMT: No tonsillar erythema, exudates, or enlargement; Moist mucous membranes, Good dentition, No lesions  NECK: Supple, No JVD, Normal thyroid  NERVOUS SYSTEM:  Alert & Oriented X3, Good concentration; Motor Strength 5/5 B/L upper and lower extremities; DTRs 2+ intact and symmetric  CHEST/LUNG: Clear to percussion bilaterally; No rales, rhonchi, wheezing, or rubs  HEART: Regular rate and rhythm; No murmurs, rubs, or gallops  ABDOMEN: Soft, Nontender, Nondistended; Bowel sounds present  EXTREMITIES:  2+ Peripheral Pulses, No clubbing, cyanosis, or edema  LYMPH: No lymphadenopathy noted  SKIN: No rashes or lesions  LABS:                        8.6    12.15 )-----------( 223      ( 19 May 2020 06:26 )             26.1     05-19    139  |  107  |  34<H>  ----------------------------<  107<H>  4.3   |  24  |  1.62<H>    Ca    8.5      19 May 2020 06:26  Phos  3.6     05-19  Mg     2.2     05-19          CAPILLARY BLOOD GLUCOSE

## 2020-05-19 NOTE — PROGRESS NOTE ADULT - SUBJECTIVE AND OBJECTIVE BOX
DATE OF SERVICE:Patient was seen and examined :05/19/2020    PRESENTING CC:Hematuria    SUBJ: 88 yo Male from Home w/ PMHx of CAD s/p stent ([id number]), HTN, Hypothyroid, Neurogenic Bladder and BPH with chronic Infante, DM, CKD and PSHx Green Light PVP 2010, presented to ED w/ hematuria on CBI needs IR embolization,been having interrmittent chest pain improved on nitrates  awaiting transfer to [facility].      [facility] -reviewed admission note, no change since admission  Heart failure: acute [ ] chronic [ ] acute or chronic [ ] diastolic [ ] systolic [ ] combined systolic and diastolic[ ]  TERI: ATN[ ] renal medullary necrosis [ ] CKD I [ ]CKDII [ ]CKD III [x ]CKD IV [ ]CKD V [ ]Other pathological lesions [ ]    MEDICATIONS  (STANDING):  artificial  tears Solution 1 Drop(s) Both EYES three times a day  aspirin enteric coated 81 milliGRAM(s) Oral daily  ATENolol  Tablet 100 milliGRAM(s) Oral daily  atorvastatin 40 milliGRAM(s) Oral at bedtime  cefTRIAXone   IVPB 1000 milliGRAM(s) IV Intermittent every 24 hours  cholecalciferol 1000 Unit(s) Oral daily  ferrous    sulfate 325 milliGRAM(s) Oral daily  folic acid 1 milliGRAM(s) Oral daily  isosorbide   dinitrate Tablet (ISORDIL) 10 milliGRAM(s) Oral three times a day  levothyroxine 112 MICROGram(s) Oral daily  lidocaine   Patch 1 Patch Transdermal daily  pantoprazole    Tablet 40 milliGRAM(s) Oral before breakfast  senna 2 Tablet(s) Oral at bedtime    MEDICATIONS  (PRN):  acetaminophen   Tablet .. 650 milliGRAM(s) Oral every 6 hours PRN Temp greater or equal to 38C (100.4F), Mild Pain (1 - 3)  ondansetron Injectable 4 milliGRAM(s) IV Push every 8 hours PRN Nausea and/or Vomiting  polyethylene glycol 3350 17 Gram(s) Oral daily PRN Constipation  traMADol 25 milliGRAM(s) Oral every 8 hours PRN Moderate Pain (4 - 6)              REVIEW OF SYSTEMS:  Constitutional: [ ] fever, [ ]weight loss,  [x ]fatigue  Eyes: [ ] visual changes  Respiratory: [ ]shortness of breath;  [ ] cough, [ ]wheezing, [ ]chills, [ ]hemoptysis  Cardiovascular: [x ] chest pain, [ ]palpitations, [ ]dizziness,  [ ]leg swelling[ ]orthopnea[ ]PND  Gastrointestinal: [ ] abdominal pain, [ ]nausea, [ ]vomiting,  [ ]diarrhea   Genitourinary: [ ] dysuria, [x ] hematuria  Neurologic: [ ] headaches [ ] tremors[ ]weakness  Skin: [ ] itching, [ ]burning, [ ] rashes  Endocrine: [ ] heat or cold intolerance  Musculoskeletal: [ ] joint pain or swelling; [ ] muscle, back, or extremity pain  Psychiatric: [ ] depression, [ ]anxiety, [ ]mood swings, or [ ]difficulty sleeping  Hematologic: [ ] easy bruising, [ ] bleeding gums    [x] All remaining systems negative except as per above.   [ ]Unable to obtain.    Vital Signs Last 24 Hrs  T(C): 36.5 (19 May 2020 07:24), Max: 37.3 (18 May 2020 15:32)  T(F): 97.7 (19 May 2020 07:24), Max: 99.2 (19 May 2020 04:52)  HR: 56 (19 May 2020 07:24) (56 - 68)  BP: 100/34 (19 May 2020 07:24) (100/34 - 142/61)  RR: 18 (19 May 2020 07:24) (17 - 20)  SpO2: 96% (19 May 2020 07:24) (94% - 98%)  I&O's Summary    18 May 2020 07:01  -  19 May 2020 07:00  --------------------------------------------------------  IN: 06754 mL / OUT: 19750 mL / NET: -175 mL    19 May 2020 07:01  -  19 May 2020 09:22  --------------------------------------------------------  IN: 3500 mL / OUT: 0 mL / NET: 3500 mL        PHYSICAL EXAM:  General: No acute distress BMI-25.8  HEENT: EOMI, PERRL  Neck: Supple, [ ] JVD  Lungs: Equal air entry bilaterally; [ ] rales [ ] wheezing [ ] rhonchi  Heart: Regular rate and rhythm; [x ] murmur   2/6 [x ] systolic [ ] diastolic [ ] radiation[ ] rubs [ ]  gallops  Abdomen: Nontender, bowel sounds present  Extremities: No clubbing, cyanosis, [ ] edema  Nervous system:  Alert & Oriented X3, no focal deficits  Psychiatric: Normal affect  Skin: No rashes or lesions    LABS:  05-19    139  |  107  |  34<H>  ----------------------------<  107<H>  4.3   |  24  |  1.62<H>    Ca    8.5      19 May 2020 06:26  Phos  3.6     05-19  Mg     2.2     05-19      Creatinine Trend: 1.62<--, 1.59<--, 1.95<--, 1.72<--, 1.87<--, 1.93<--                        8.6    12.15 )-----------( 223      ( 19 May 2020 06:26 )             26.1       Lipid Panel:   Cardiac Enzymes: CARDIAC MARKERS ( 19 May 2020 06:26 )  2.430 ng/mL / x     / x     / x     / x      CARDIAC MARKERS ( 18 May 2020 07:04 )  0.287 ng/mL / x     / x     / x     / x            IMPRESSION AND PLAN:    Problem/Plan - 1:  ·  Problem: Hematuria.  Plan: Patient has chronic  hematuria, CBI was initiated again.  -s/p 3 unit pRBC, hgb now stable- 8.7  - Infante with red tinged urine, 2 large blood clots noted after irrigation  - Continue with CBI as needed for shawna hematuria  -For transfer to [facility] for IR embolization        Problem/Plan - 2:  ·  Problem: Chest pain-NSTEMI.  Plan: Original plans for transfer for cardiac cath, however due to persistent acute blood loss anemia.  Hx CAD  Continue nitrates Aspirin statins         Problem/Plan - 3:  ·  Problem: CAD (Coronary Artery Disease).  Plan: Continue with Asprin, plavix dced  Due to persistent hematuria.

## 2020-05-19 NOTE — TRANSFER ACCEPTANCE NOTE - PROBLEM SELECTOR PLAN 1
Patient has chronic acevedo -> now with hematuria, CBI was initiated at [facility]  - anemia 2/2 to hematuria--> s/p 3 unit pRBC, hgb now stable- 8.6, keep transfusing to Keep Hg > 7  - Acevedo with red tinged urine, 2 large blood clots noted earlier after irrigation  - Urology to call for follow up and Continue with CBI as needed for shawna hematuria  - as per Dr Weinberg d/w Dr Wiley (IR) in am for IR embolization

## 2020-05-19 NOTE — TRANSFER ACCEPTANCE NOTE - PMH
BPH (Benign Prostatic Hyperplasia)    CAD (Coronary Artery Disease)  CABG-5-6 yrs ago- [facility]  Dyslipidemia    HTN - Hypertension    Hypothyroidism    PAD (Peripheral Artery Disease)  lower legs  S/P CABG (Coronary Artery Bypass Graft)  x 2 vessels -2005or 2006 [facility]

## 2020-05-19 NOTE — TRANSFER ACCEPTANCE NOTE - PROBLEM SELECTOR PLAN 2
- c/o Chest pain/ elevated CE/ NSTEMI, likely demand ischemia in setting of acute blood loss anemia  - will eventually need ischemic eval, possible cardiac cath  - trend CE, tele monitor, EKG, c/w Asa 81  - c/w nitrates, statins

## 2020-05-19 NOTE — ACUTE INTERFACILITY TRANSFER NOTE - PLAN OF CARE
CLEAR URINE Pt admitted for hematuria and with anemia. Pt had received  3 units  of PRBC. Hb had been stable with no further drops. Urology consult was requested and pt was recommended CBI. Clots were often noted in the CBI. Pt to be transferred to the [facility] for IR embolization Catheterization if needed Pt admitted for hematuria and with anemia. Pt had received  3 units  of PRBC. Hb had been stable with no further drops. Urology consult was requested and pt was recommended CBI. Clots were often noted in the CBI. Pt to be transferred to the [facility] for IR embolization gutierrez with home meds Pt takes Levothyroxine 112mcg DAILY. STABALISE bp Pt has known HTN. Pt currently on atenolol, isordil. Monitor BP. C/W ASPIRIN, STATIN,

## 2020-05-19 NOTE — PROGRESS NOTE ADULT - ASSESSMENT
_________________________________________________________________________________________  ========>>  M E D I C A L   A T T E N D I N G    F O L L O W  U P  N O T E  <<=========  -----------------------------------------------------------------------------------------------------    - Patient seen and examined by me approximately sixty minutes ago.  (covering today)   - In summary,  EDUIN SORIANO is a 87y year old man who originally presented with hematuria   - Patient today overall doing ok, comfortable, eating OK. + c/o some back pain     ==================>> REVIEW OF SYSTEM <<=================    GEN: no fever, no chills, pain a above   RESP: no SOB, no cough, no sputum  CVS: no chest pain, no palpitations, no edema  GI: no abdominal pain, no nausea, no constipation, no diarrhea  : no dysuria, no frequency, hematuria clearing with CBI  Neuro: no headache, no dizziness  Derm : no itching, no rash    ==================>> PHYSICAL EXAM <<=================    GEN: A&O X 3 , NAD , comfortable, eating   HEENT: NCAT, PERRL, MMM, hearing intact  Neck: supple , no JVD appreciated  CVS: S1S2 , regular , No M/R/G appreciated  PULM: CTA B/L,  no W/R/R appreciated  ABD.: soft. non tender, non distended,  bowel sounds present  Extrem: intact pulses , no edema , pale skin      CBI ongoing      PRBC ongoing   PSYCH : normal mood,  not anxious      ==================>> MEDICATIONS <<====================    MEDICATIONS  (STANDING):  artificial  tears Solution 1 Drop(s) Both EYES three times a day  aspirin enteric coated 81 milliGRAM(s) Oral daily  ATENolol  Tablet 100 milliGRAM(s) Oral daily  atorvastatin 40 milliGRAM(s) Oral at bedtime  cefTRIAXone   IVPB 1000 milliGRAM(s) IV Intermittent every 24 hours  cholecalciferol 1000 Unit(s) Oral daily  ferrous    sulfate 325 milliGRAM(s) Oral daily  folic acid 1 milliGRAM(s) Oral daily  isosorbide   dinitrate Tablet (ISORDIL) 10 milliGRAM(s) Oral three times a day  levothyroxine 112 MICROGram(s) Oral daily  lidocaine   Patch 1 Patch Transdermal daily  senna 2 Tablet(s) Oral at bedtime    MEDICATIONS  (PRN):  acetaminophen   Tablet .. 650 milliGRAM(s) Oral every 6 hours PRN Temp greater or equal to 38C (100.4F), Mild Pain (1 - 3)  ondansetron Injectable 4 milliGRAM(s) IV Push every 8 hours PRN Nausea and/or Vomiting  traMADol 25 milliGRAM(s) Oral every 8 hours PRN Moderate Pain (4 - 6)    ==================>> VITAL SIGNS <<==================    T(C): 36.3 (05-17-20 @ 10:06), Max: 36.6 (05-17-20 @ 07:48)  HR: 52 (05-17-20 @ 10:06) (52 - 59)  BP: 120/42 (05-17-20 @ 10:06) (120/42 - 143/56)  RR: 18 (05-17-20 @ 10:06) (18 - 19)  SpO2: 100% (05-17-20 @ 10:06) (99% - 100%)     I&O's Summary    16 May 2020 07:01  -  17 May 2020 07:00  --------------------------------------------------------  IN: 04622 mL / OUT: 40612 mL / NET: -2100 mL    17 May 2020 07:01  -  17 May 2020 12:57  --------------------------------------------------------  IN: 0 mL / OUT: 6000 mL / NET: -6000 mL     ==================>> LAB AND IMAGING <<==================                        7.5    9.74  )-----------( 215      ( 17 May 2020 08:42 )             23.0        Hemoglobin:   7.5 <<==,  7.1 <<==,  9.5 <<==,  9.8 <<==,  9.0 <<==,  9.4 <<==    139  |  108  |  44<H>  ----------------------------<  117<H>  4.1   |  25  |  1.95<H>    Ca    8.4      17 May 2020 06:52    Creatinine:  1.95  <<==, 1.72  <<==, 1.87  <<==, 1.93  <<==, 1.87  <<==, 2.19  <<==     TSH:      2.87   (05-13-20)           Lipid profile:  (05-13-20)     Total: 117     LDL  : 56     HDL  :38     TG   :115     HgA1C:     (05-13-20)      6.4    ___________________________________________________________________________________  ===============>>  A S S E S S M E N T   A N D   P L A N <<===============  ------------------------------------------------------------------------------------------    - Patient evaluated by me, chart reviewed.  overall doing well  hematouria clearing  urology following  PRBC ongoing for acute blood loss anemia   monitor Hgl / creatinine closely   continue lidocaine patch for back pain  abx per      -GI/DVT Prophylaxis.    --------------------------------------------  Case discussed with pt  Education given on findings and plan of care  ___________________________  MONO Paul D.O.  (covering today)   Pager: [phone]
87 year old male with hematuria. MI    - continue CBI at slow drip, titrate as tolerated  - irrigate PRN   - continue to monitor H/H, transfuse as needed  - not good candidate for OR  - consider transfer  - continue medical/ cardiac management  - will discuss with Dr Vallejo
87 yoM with NSTEMI and hematuria    on bASA, plavix held    - pending cardiac cath and IR embolization, to be transferred to [facility] -to be followed there  - continue CBI, irrigate PRN for clot evac if increased pressure and poor flow in acevedo  - monitor UO  - care per primary team  - d/w Dr. Sim
87 yoM with NSTEMI and hematuria    on bASA, plavix held  h/h dropped, 1u give this AM  acevedo clearer without retention    - s/p 1u, transfuse per primary  - pending cardiac cath and IR embolization, to be transferred to [facility] -to be followed there  - continue CBI, titrate rate depending of urine color, irrigate PRN for clot evac if increased pressure and poor flow in acevedo  - monitor UO  - continue anemia w/u and plan per primary team  - d/w Dr. Sim
87y.o. Male with resolving hematuria    -d/c CBI apparatus  -Clamp 3rd port of acevedo  -Leave in place and change monthly as scheduled  -May f/u with Dr. Sim in office or Dr. Rangel
hematuria   h/h respond well to transfusion   requiring irrigation twice when cbi ran out - must monitor closely   spoke with dr pamela staley at [facility] pae can be done while on AC and would prefer after cardiac cath to optimize for procedure   rec transfer to [facility] for cardiac james and possible pae after   urology will follow there
pt cont to have intermittent chest pain   urine clear on mod cbi does require rrigation when runs out   h/h stable  not good candidate for or  advise eval for pae - would require transfer to [facility]
pt was brought in sec to hematurea ( shawna all bloody with lower abd pain) x 1 day  had 6 months ago same at [place]  now has chronic acevedo  following with urology  had foleys changed 2-3 weeks ago  he doesnt know what Dx was made.  h/o bph   seen by urology in ER  and cbi three way catheter inserted   parmjit nd examined  vsstable on arrival was mildly hypotensive   now ok  pt is alert awake  had some CP non radiating   physical done  labs noted  wbc 13.8  creatinine  2.19   a/p hematurea  ct abd showed colts very enlarged prostate  3 way catheter  uro follow up  pascale give a dose of rocephine  chest pain  ekg, trop, cardiology  cad s/p cabg cont same  hold plavix for 24 houurs and assess
seen and examined  vsstable afebrile physical unchanged  still bleeding but mildly on CBI   also has cp mild without sob or palp  has mild discomfort on lt shoulder  seen by card in am  note to f/u  labs wbc 15 afebrile.  wbc 9.2  craet 1.59  a/p  hematurea  chronic on cbi pt needs IR guided embolization of bladder artery at the same time has on going CP ( as per him he has those same CP from over year on and off)  d/w Cardiologist, no angiogram as pt is actively bleeding and pts Creat 1.59  i also spoke to Dr Sim urology who spoke to Dr Wiley IR at [facility]  Dr Sim advised to transfer to [facility], i again called dr Trevino , he will be accepting physician and card at [facility].  I spoke to transfer center and pt was accepting, but they want repeat COVID test  spoke to resident  watch hgb, watch for CP
seen and examined comfortable on edge of bed having lunch  comfortable  no complaints  not in any distress. no cp since am  or sob or palp.  urobag very little tinged urine  labs  wbc 12.5,  creat up to 1.62  a/p  hematurea  and drop in hgb  going to [facility] for embolization  watch hgb   creat 1.6  watch enal function  Chest pain  elevated trops  card on case  possible transfer to  [facility] to day
seen and examined vsstable afebrile comforetable not in any distress, denies abd pain, no nausea or vomiting complaining of constipation. no bm from 3 days  hematurea better  no cp or palp  physical done ok  labs noted  hgb 8.9  creat from 2 to 1.87  troponins high   covid neg  hematurea on cbi  pink color in Urobag   chest pain troponins were high seen by cardiology
seen and examined vsstable afebrile comfortable on bed not in any distress  no cp or palp. still has bleeding on off  urobag some mixed blood noted sbp slighty low  no dizzyness  hgb 9.8  creat 1.87  a/p hematurea and non st t mi  d/w dr mendez and cardiology  as per urology pt needs coronary angiogram and then bladder embolization  pt to be transferred to [facility]  i spoke to card again   transfer to be done on monday  watch cbc
seen and examined vsstable afebrile phyiscal unchanged comfortabl not in any distress  Hb 8.7  Chest pain resolving   BLood tinged urine,
seen and examined vsstable afebrile phyiscal unchanged comfortabl not in any distress  hgb hgb 7.9   creat 1.9  still bleeding  txc  2 units
seen and examined vsstable afebrile phyiscal unchanged comfortabl not in any distress  hgb hgb 7.9   creat 1.9  still bleeding  txc  2 units
87y.o. Male with resolving hematuria    -CBI slowed, monitor UO for bleeding  -Titrate CBI as tolerated
seen and examined vsstable afebrile phyiscal unchanged comfortabl not in any distress  hgb hgb 7.9   creat 1.9  still bleeding  txc  2 units

## 2020-05-19 NOTE — TRANSFER ACCEPTANCE NOTE - ASSESSMENT
Yes...
88 yo Male with PMHx of CAD s/p stent ([id number]), HTN, Hypothyroid, Neurogenic Bladder and BPH with chronic Infante, DM, CKD and PSHx Green Light PVP 2010, transferred from [facility] with hematuria and anemia for IR embolization.  Also occasional Chest pain with exertion.

## 2020-05-19 NOTE — ACUTE INTERFACILITY TRANSFER NOTE - HOSPITAL COURSE
HPI:  88 yo Male from Home w/ PMHx of CAD s/p stent ([id number]), HTN, Hypothyroid, Neurogenic Bladder and BPH with chronic Acevedo, DM, CKD and PSHx Green Light PVP 2010, presented to ED w/ hematuria since yesterday. He c/o Chest pain, palpitations. It is intermittent, on and off, aggravated with exertion and relieved with rest. His Cardiologist is Dr Cunha [phone].  Pt currently under care of Dr [facility], pt w/ chronic acevedo, states acevedo catheter is being exchanged every month, last acevedo catheter change was 2 weeks ago; pt admits to previous hx of hematuria, 6 months ago was seen at [facility] w/ hematuria;   [facility] full code (12 May 2020 15:28)  Pt admitted for hematuria and with anemia. Pt had received  3 units  of PRBC. Hb had been stable with no further drops. Urology consult was requested and pt was recommended CBI. Clots were often noted in the CBI. Pt to be transferred to the [facility] for IR embolization.   Pt admitted for hematuria and with anemia. Pt had received  3 units  of PRBC. Hb had been stable with no further drops. Urology consult was requested and pt was recommended CBI. Clots were often noted in the CBI. Pt to be transferred to the [facility] for IR embolization

## 2020-05-19 NOTE — PROGRESS NOTE ADULT - SUBJECTIVE AND OBJECTIVE BOX
Patient seen and examined bedside resting comfortably.  States that he felt pain/pressure this morning and needed to be irrigated.  Per RN irrigated large clots yesterday, but no clots were irrigated since then.     T(F): 97.7 (05-19-20 @ 07:24), Max: 99.2 (05-19-20 @ 04:52)  HR: 56 (05-19-20 @ 07:24) (56 - 68)  BP: 100/34 (05-19-20 @ 07:24) (100/34 - 142/61)  RR: 18 (05-19-20 @ 07:24) (17 - 20)  SpO2: 96% (05-19-20 @ 07:24) (94% - 98%)    PHYSICAL EXAM:  General: NAD, WDWN  Neuro:  Alert & oriented x 3  Lung: respirations nonlabored, good inspiratory effort  Abdomen: soft, NTND  : CBI running at slow drip via CBI, will continue CBI. No clots or hematuria noted at this time. Urine clear.     LABS:                        8.6    12.15 )-----------( 223      ( 19 May 2020 06:26 )             26.1     05-19    139  |  107  |  34<H>  ----------------------------<  107<H>  4.3   |  24  |  1.62<H>    Ca    8.5      19 May 2020 06:26  Phos  3.6     05-19  Mg     2.2     05-19    I&O's Detail    18 May 2020 07:01  -  19 May 2020 07:00  --------------------------------------------------------  IN:    Continuous Bladder Irrigation: 92055 mL    Oral Fluid: 125 mL  Total IN: 87250 mL    OUT:    Continuous Bladder Irrigation: 60930 mL  Total OUT: 36147 mL    Total NET: -175 mL      19 May 2020 07:01  -  19 May 2020 08:42  --------------------------------------------------------  IN:    Continuous Bladder Irrigation: 1700 mL  Total IN: 1700 mL    OUT:  Total OUT: 0 mL    Total NET: 1700 mL

## 2020-05-19 NOTE — TRANSFER ACCEPTANCE NOTE - HISTORY OF PRESENT ILLNESS
This is a 86 yo Male w/ PMHx of CAD s/p stent ([id number]), HTN, Hypothyroid, Neurogenic Bladder and BPH with chronic Acevedo, DM, CKD and PSHx Green Light PVP 2010, presented to ED w/ hematuria and anemia to On license of [facility]. Pt had received  3 units  of PRBC. Hb had been stable with no further drops. Urology consult was requested and pt was recommended CBI. Clots were often noted in the CBI. Pt was transferred to the [facility] for further management of hematuria, will likely need IR embolization. Pt also endorces having occasional Chest pain with exertion and palpitations, describes as discomfort that improves with rest. Denies any HILL, SOB, no N/V/D, no recent fevers or chills.   His Cardiologist is Dr Cunha [phone].  Pt currently under care of Dr [facility], pt w/ chronic acevedo, states acevedo catheter is being exchanged every month, last acevedo catheter change was 2 weeks ago; pt admits to previous hx of hematuria, 6 months ago was seen at [facility] w/ hematuria.  Currently pt appears comfortable NAD, AOx 3, pleasant on approach,  no Chest pain at th is time.

## 2020-05-20 DIAGNOSIS — E03.9 HYPOTHYROIDISM, UNSPECIFIED: ICD-10-CM

## 2020-05-20 DIAGNOSIS — R31.9 HEMATURIA, UNSPECIFIED: ICD-10-CM

## 2020-05-20 DIAGNOSIS — I25.10 ATHEROSCLEROTIC HEART DISEASE OF NATIVE CORONARY ARTERY WITHOUT ANGINA PECTORIS: ICD-10-CM

## 2020-05-20 DIAGNOSIS — E78.5 HYPERLIPIDEMIA, UNSPECIFIED: ICD-10-CM

## 2020-05-20 LAB
ANION GAP SERPL CALC-SCNC: 10 MMO/L — SIGNIFICANT CHANGE UP (ref 7–14)
BLD GP AB SCN SERPL QL: NEGATIVE — SIGNIFICANT CHANGE UP
BUN SERPL-MCNC: 35 MG/DL — HIGH (ref 7–23)
CALCIUM SERPL-MCNC: 8.3 MG/DL — LOW (ref 8.4–10.5)
CHLORIDE SERPL-SCNC: 106 MMOL/L — SIGNIFICANT CHANGE UP (ref 98–107)
CO2 SERPL-SCNC: 23 MMOL/L — SIGNIFICANT CHANGE UP (ref 22–31)
CREAT SERPL-MCNC: 1.82 MG/DL — HIGH (ref 0.5–1.3)
GLUCOSE SERPL-MCNC: 136 MG/DL — HIGH (ref 70–99)
HCT VFR BLD CALC: 25.5 % — LOW (ref 39–50)
HCT VFR BLD CALC: 27.2 % — LOW (ref 39–50)
HGB BLD-MCNC: 8.4 G/DL — LOW (ref 13–17)
HGB BLD-MCNC: 8.9 G/DL — LOW (ref 13–17)
MAGNESIUM SERPL-MCNC: 2 MG/DL — SIGNIFICANT CHANGE UP (ref 1.6–2.6)
MCHC RBC-ENTMCNC: 30.9 PG — SIGNIFICANT CHANGE UP (ref 27–34)
MCHC RBC-ENTMCNC: 31.1 PG — SIGNIFICANT CHANGE UP (ref 27–34)
MCHC RBC-ENTMCNC: 32.7 % — SIGNIFICANT CHANGE UP (ref 32–36)
MCHC RBC-ENTMCNC: 32.9 % — SIGNIFICANT CHANGE UP (ref 32–36)
MCV RBC AUTO: 93.8 FL — SIGNIFICANT CHANGE UP (ref 80–100)
MCV RBC AUTO: 95.1 FL — SIGNIFICANT CHANGE UP (ref 80–100)
NRBC # FLD: 0 K/UL — SIGNIFICANT CHANGE UP (ref 0–0)
NRBC # FLD: 0 K/UL — SIGNIFICANT CHANGE UP (ref 0–0)
PHOSPHATE SERPL-MCNC: 3.5 MG/DL — SIGNIFICANT CHANGE UP (ref 2.5–4.5)
PLATELET # BLD AUTO: 178 K/UL — SIGNIFICANT CHANGE UP (ref 150–400)
PLATELET # BLD AUTO: 187 K/UL — SIGNIFICANT CHANGE UP (ref 150–400)
PMV BLD: 9.2 FL — SIGNIFICANT CHANGE UP (ref 7–13)
PMV BLD: 9.6 FL — SIGNIFICANT CHANGE UP (ref 7–13)
POTASSIUM SERPL-MCNC: 4.3 MMOL/L — SIGNIFICANT CHANGE UP (ref 3.5–5.3)
POTASSIUM SERPL-SCNC: 4.3 MMOL/L — SIGNIFICANT CHANGE UP (ref 3.5–5.3)
RBC # BLD: 2.72 M/UL — LOW (ref 4.2–5.8)
RBC # BLD: 2.86 M/UL — LOW (ref 4.2–5.8)
RBC # FLD: 16.5 % — HIGH (ref 10.3–14.5)
RBC # FLD: 16.7 % — HIGH (ref 10.3–14.5)
RH IG SCN BLD-IMP: POSITIVE — SIGNIFICANT CHANGE UP
SODIUM SERPL-SCNC: 139 MMOL/L — SIGNIFICANT CHANGE UP (ref 135–145)
WBC # BLD: 7.54 K/UL — SIGNIFICANT CHANGE UP (ref 3.8–10.5)
WBC # BLD: 7.93 K/UL — SIGNIFICANT CHANGE UP (ref 3.8–10.5)
WBC # FLD AUTO: 7.54 K/UL — SIGNIFICANT CHANGE UP (ref 3.8–10.5)
WBC # FLD AUTO: 7.93 K/UL — SIGNIFICANT CHANGE UP (ref 3.8–10.5)

## 2020-05-20 PROCEDURE — 99222 1ST HOSP IP/OBS MODERATE 55: CPT

## 2020-05-20 RX ORDER — CEFTRIAXONE 500 MG/1
1000 INJECTION, POWDER, FOR SOLUTION INTRAMUSCULAR; INTRAVENOUS EVERY 24 HOURS
Refills: 0 | Status: COMPLETED | OUTPATIENT
Start: 2020-05-20 | End: 2020-05-26

## 2020-05-20 RX ORDER — ATENOLOL 25 MG/1
100 TABLET ORAL DAILY
Refills: 0 | Status: DISCONTINUED | OUTPATIENT
Start: 2020-05-20 | End: 2020-05-20

## 2020-05-20 RX ORDER — FOLIC ACID 0.8 MG
1 TABLET ORAL DAILY
Refills: 0 | Status: DISCONTINUED | OUTPATIENT
Start: 2020-05-20 | End: 2020-05-27

## 2020-05-20 RX ORDER — FERROUS SULFATE 325(65) MG
325 TABLET ORAL DAILY
Refills: 0 | Status: DISCONTINUED | OUTPATIENT
Start: 2020-05-20 | End: 2020-05-27

## 2020-05-20 RX ORDER — SODIUM CHLORIDE 9 MG/ML
3 INJECTION INTRAMUSCULAR; INTRAVENOUS; SUBCUTANEOUS EVERY 8 HOURS
Refills: 0 | Status: DISCONTINUED | OUTPATIENT
Start: 2020-05-20 | End: 2020-05-27

## 2020-05-20 RX ORDER — FINASTERIDE 5 MG/1
5 TABLET, FILM COATED ORAL DAILY
Refills: 0 | Status: DISCONTINUED | OUTPATIENT
Start: 2020-05-20 | End: 2020-05-27

## 2020-05-20 RX ORDER — ISOSORBIDE DINITRATE 5 MG/1
10 TABLET ORAL THREE TIMES A DAY
Refills: 0 | Status: DISCONTINUED | OUTPATIENT
Start: 2020-05-20 | End: 2020-05-27

## 2020-05-20 RX ORDER — ASPIRIN/CALCIUM CARB/MAGNESIUM 324 MG
81 TABLET ORAL DAILY
Refills: 0 | Status: DISCONTINUED | OUTPATIENT
Start: 2020-05-20 | End: 2020-05-27

## 2020-05-20 RX ORDER — METOPROLOL TARTRATE 50 MG
25 TABLET ORAL
Refills: 0 | Status: DISCONTINUED | OUTPATIENT
Start: 2020-05-20 | End: 2020-05-27

## 2020-05-20 RX ORDER — LEVOTHYROXINE SODIUM 125 MCG
112 TABLET ORAL DAILY
Refills: 0 | Status: DISCONTINUED | OUTPATIENT
Start: 2020-05-20 | End: 2020-05-27

## 2020-05-20 RX ORDER — CHOLECALCIFEROL (VITAMIN D3) 125 MCG
1000 CAPSULE ORAL DAILY
Refills: 0 | Status: DISCONTINUED | OUTPATIENT
Start: 2020-05-20 | End: 2020-05-27

## 2020-05-20 RX ORDER — ACETAMINOPHEN 500 MG
650 TABLET ORAL EVERY 6 HOURS
Refills: 0 | Status: DISCONTINUED | OUTPATIENT
Start: 2020-05-20 | End: 2020-05-27

## 2020-05-20 RX ORDER — SENNA PLUS 8.6 MG/1
2 TABLET ORAL AT BEDTIME
Refills: 0 | Status: DISCONTINUED | OUTPATIENT
Start: 2020-05-20 | End: 2020-05-27

## 2020-05-20 RX ORDER — POLYETHYLENE GLYCOL 3350 17 G/17G
17 POWDER, FOR SOLUTION ORAL DAILY
Refills: 0 | Status: DISCONTINUED | OUTPATIENT
Start: 2020-05-20 | End: 2020-05-27

## 2020-05-20 RX ORDER — ONDANSETRON 8 MG/1
4 TABLET, FILM COATED ORAL ONCE
Refills: 0 | Status: DISCONTINUED | OUTPATIENT
Start: 2020-05-20 | End: 2020-05-27

## 2020-05-20 RX ORDER — PANTOPRAZOLE SODIUM 20 MG/1
40 TABLET, DELAYED RELEASE ORAL
Refills: 0 | Status: DISCONTINUED | OUTPATIENT
Start: 2020-05-20 | End: 2020-05-27

## 2020-05-20 RX ORDER — LIDOCAINE 4 G/100G
1 CREAM TOPICAL DAILY
Refills: 0 | Status: DISCONTINUED | OUTPATIENT
Start: 2020-05-20 | End: 2020-05-27

## 2020-05-20 RX ORDER — ATORVASTATIN CALCIUM 80 MG/1
40 TABLET, FILM COATED ORAL AT BEDTIME
Refills: 0 | Status: DISCONTINUED | OUTPATIENT
Start: 2020-05-20 | End: 2020-05-27

## 2020-05-20 RX ADMIN — SODIUM CHLORIDE 3 MILLILITER(S): 9 INJECTION INTRAMUSCULAR; INTRAVENOUS; SUBCUTANEOUS at 20:10

## 2020-05-20 RX ADMIN — SENNA PLUS 2 TABLET(S): 8.6 TABLET ORAL at 20:23

## 2020-05-20 RX ADMIN — SODIUM CHLORIDE 3 MILLILITER(S): 9 INJECTION INTRAMUSCULAR; INTRAVENOUS; SUBCUTANEOUS at 06:09

## 2020-05-20 RX ADMIN — Medication 1 MILLIGRAM(S): at 12:47

## 2020-05-20 RX ADMIN — CEFTRIAXONE 100 MILLIGRAM(S): 500 INJECTION, POWDER, FOR SOLUTION INTRAMUSCULAR; INTRAVENOUS at 06:20

## 2020-05-20 RX ADMIN — ISOSORBIDE DINITRATE 10 MILLIGRAM(S): 5 TABLET ORAL at 20:23

## 2020-05-20 RX ADMIN — Medication 112 MICROGRAM(S): at 06:21

## 2020-05-20 RX ADMIN — ATORVASTATIN CALCIUM 40 MILLIGRAM(S): 80 TABLET, FILM COATED ORAL at 20:23

## 2020-05-20 RX ADMIN — LIDOCAINE 1 PATCH: 4 CREAM TOPICAL at 17:19

## 2020-05-20 RX ADMIN — Medication 81 MILLIGRAM(S): at 12:47

## 2020-05-20 RX ADMIN — Medication 1 DROP(S): at 20:22

## 2020-05-20 RX ADMIN — Medication 1000 UNIT(S): at 12:47

## 2020-05-20 RX ADMIN — SODIUM CHLORIDE 3 MILLILITER(S): 9 INJECTION INTRAMUSCULAR; INTRAVENOUS; SUBCUTANEOUS at 13:30

## 2020-05-20 RX ADMIN — Medication 1 DROP(S): at 13:30

## 2020-05-20 RX ADMIN — Medication 325 MILLIGRAM(S): at 12:47

## 2020-05-20 RX ADMIN — PANTOPRAZOLE SODIUM 40 MILLIGRAM(S): 20 TABLET, DELAYED RELEASE ORAL at 06:21

## 2020-05-20 RX ADMIN — LIDOCAINE 1 PATCH: 4 CREAM TOPICAL at 20:10

## 2020-05-20 RX ADMIN — Medication 1 DROP(S): at 06:20

## 2020-05-20 NOTE — CONSULT NOTE ADULT - ASSESSMENT
87M w/ PMHx of CAD s/p stent ([id number]), HTN, Hypothyroid, BPH with chronic Acevedo s/p greenlight PVP 2010, DM, CKD presenting with hematuria. Found to have NSTEMI on admission  Transferred from  for prostate emolization    - Discussed case with Dr. Wiley  - He will perform prostate embolization pending cardiac clearance documentation  - Scheduling per IR  - Continue acevedo catheter  - Monitor urine color  - No need for CBI at this time; irrigate prn

## 2020-05-20 NOTE — PROGRESS NOTE ADULT - SUBJECTIVE AND OBJECTIVE BOX
Patient scheduled for IR prostate embolization.  Hx CAD s/p CABG recent troponin leak 2/2 demand ischemia remains chest pain free on Nitrates.  He is at moderate cardiac risk with no cardiac contraindications for IR procedure.

## 2020-05-20 NOTE — CONSULT NOTE ADULT - SUBJECTIVE AND OBJECTIVE BOX
HPI  86 yo Male w/ PMHx of CAD s/p stent ([id number]), HTN, Hypothyroid, BPH with chronic Infante s/p greenlight PVP 2010, DM, CKD , presented to [place] ED w/ hematuria on 5/12. He complained of chest pain at that time as well.  He was admitted to the hospital and started on CBI. He got a total of 4u pRBCs during his hospital course. Also during his hospital stay he was diagnosed with an NSTEMI. A cardiac cath was not performed due to his acute anemia.  He was transferred here for prostate embolization for his persistent hematuria. His outside urologist, Dr. Rangel, had already ruled out other sources of hematuria. Pt had cystoscopy as well as CT urogram within the last 6 months.    PAST MEDICAL & SURGICAL HISTORY:  S/P CABG (Coronary Artery Bypass Graft): x 2 vessels -2005or 2006 [facility]  BPH (Benign Prostatic Hyperplasia)  Dyslipidemia  Hypothyroidism  PAD (Peripheral Artery Disease): lower legs  CAD (Coronary Artery Disease): CABG-5-6 yrs ago- [facility]  HTN - Hypertension  S/P CABG (Coronary Artery Bypass Graft): x 2 vessels 2006 [facility]  HTN - Hypertension      MEDICATIONS  (STANDING):  artificial  tears Solution 1 Drop(s) Both EYES three times a day  aspirin enteric coated 81 milliGRAM(s) Oral daily  atorvastatin 40 milliGRAM(s) Oral at bedtime  cefTRIAXone   IVPB 1000 milliGRAM(s) IV Intermittent every 24 hours  cholecalciferol 1000 Unit(s) Oral daily  ferrous    sulfate 325 milliGRAM(s) Oral daily  folic acid 1 milliGRAM(s) Oral daily  isosorbide   dinitrate Tablet (ISORDIL) 10 milliGRAM(s) Oral three times a day  levothyroxine 112 MICROGram(s) Oral daily  metoprolol tartrate 25 milliGRAM(s) Oral two times a day  pantoprazole    Tablet 40 milliGRAM(s) Oral before breakfast  senna 2 Tablet(s) Oral at bedtime  sodium chloride 0.9% lock flush 3 milliLiter(s) IV Push every 8 hours    MEDICATIONS  (PRN):  acetaminophen   Tablet .. 650 milliGRAM(s) Oral every 6 hours PRN Mild Pain (1 - 3), Moderate Pain (4 - 6), Severe Pain (7 - 10)  ondansetron Injectable 4 milliGRAM(s) IV Push once PRN Nausea and/or Vomiting  polyethylene glycol 3350 17 Gram(s) Oral daily PRN Constipation      FAMILY HISTORY:      Allergies  morphine (Nausea)      SOCIAL HISTORY:  Smoked for 2-3 years many years ago    REVIEW OF SYSTEMS:   Otherwise negative as stated in HPI    Physical Exam  Vital signs  T(C): 36.4 (05-20-20 @ 12:07), Max: 36.7 (05-19-20 @ 15:47)  HR: 53 (05-20-20 @ 12:07)  BP: 98/53 (05-20-20 @ 12:07)  SpO2: 100% (05-20-20 @ 12:07)  Wt(kg): --    Output    OUT:    Voided: 700 mL  Total OUT: 700 mL    Total NET: -700 mL          Gen:  NAD    Pulm:  No respiratory distress    GI:  S/ND/NT    :  Infante in place draining brown jayesh urine  Flushed to clear inge without clots      LABS:      05-20 @ 09:00    WBC 7.54  / Hct 25.5  / SCr 1.82     05-19 @ 22:24    WBC 10.38 / Hct 25.9  / SCr 1.93     05-20    139  |  106  |  35<H>  ----------------------------<  136<H>  4.3   |  23  |  1.82<H>    Ca    8.3<L>      20 May 2020 09:00  Phos  3.5     05-20  Mg     2.0     05-20      PT/INR - ( 19 May 2020 22:24 )   PT: 13.0 SEC;   INR: 1.14          PTT - ( 19 May 2020 22:24 )  PTT:26.5 SEC        RADIOLOGY:  < from: CT Abdomen and Pelvis No Cont (05.12.20 @ 15:04) >  IMPRESSION:     No urinary tract calculi or hydronephrosis.    Large blood clot in the bladder lumen. Follow-up cystoscopy to exclude an underlying mass.    Markedly Enlarged prostate with intravesical component of BPH.    < end of copied text >

## 2020-05-20 NOTE — PROGRESS NOTE ADULT - SUBJECTIVE AND OBJECTIVE BOX
Interventional Radiology Brief Note    Consulted for Prostate Artery Embolization    Dr. Wiley discussed procedure its alternatives, risks and benefits with patient at bedside. Patient is amenable to moving forward with PAE.     Please place an order in sunrise under , keep patient NPO past midnight prior to procedure and order coagulation factors for the day of the procedure.     Meño Rodriguez MD  PGY-[id number], Interventional Radiology  Pager: [phone]

## 2020-05-20 NOTE — PROVIDER CONTACT NOTE (OTHER) - BACKGROUND
Pt with chronic acevedo and shawna blood urine admitted from [facility] for probable CBI tx with low diastolic in AM vs

## 2020-05-20 NOTE — PROGRESS NOTE ADULT - SUBJECTIVE AND OBJECTIVE BOX
DATE OF SERVICE:Patient was seen and examined :05/20/2020    PRESENTING CC:Hematuria    SUBJ: 88 yo Male from Home w/ PMHx of CAD s/p stent ([id number]), HTN, Hypothyroid, Neurogenic Bladder and BPH with chronic Infante, T2DM, CKD and PSHx Green Light PVP 2010, presented to [facility] w/ hematuria on CBI transferred to [facility] for IR embolization recieved PRBC Tx      PMH -reviewed admission note, no change since admission  Heart failure: acute [ ] chronic [ ] acute or chronic [ ] diastolic [ ] systolic [ ] combined systolic and diastolic[ ]  TERI: ATN[ ] renal medullary necrosis [ ] CKD I [ ]CKDII [ ]CKD III [ ]CKD IV [ ]CKD V [ ]Other pathological lesions [ ]    MEDICATIONS  (STANDING):  artificial  tears Solution 1 Drop(s) Both EYES three times a day  aspirin enteric coated 81 milliGRAM(s) Oral daily  ATENolol  Tablet 100 milliGRAM(s) Oral daily  atorvastatin 40 milliGRAM(s) Oral at bedtime  cefTRIAXone   IVPB 1000 milliGRAM(s) IV Intermittent every 24 hours  cholecalciferol 1000 Unit(s) Oral daily  ferrous    sulfate 325 milliGRAM(s) Oral daily  folic acid 1 milliGRAM(s) Oral daily  isosorbide   dinitrate Tablet (ISORDIL) 10 milliGRAM(s) Oral three times a day  levothyroxine 112 MICROGram(s) Oral daily  pantoprazole    Tablet 40 milliGRAM(s) Oral before breakfast  senna 2 Tablet(s) Oral at bedtime  sodium chloride 0.9% lock flush 3 milliLiter(s) IV Push every 8 hours    MEDICATIONS  (PRN):  acetaminophen   Tablet .. 650 milliGRAM(s) Oral every 6 hours PRN Mild Pain (1 - 3), Moderate Pain (4 - 6), Severe Pain (7 - 10)  ondansetron Injectable 4 milliGRAM(s) IV Push once PRN Nausea and/or Vomiting  polyethylene glycol 3350 17 Gram(s) Oral daily PRN Constipation              REVIEW OF SYSTEMS:  Constitutional: [ ] fever, [ ]weight loss,  [x ]fatigue  Eyes: [ ] visual changes  Respiratory: [ ]shortness of breath;  [ ] cough, [ ]wheezing, [ ]chills, [ ]hemoptysis  Cardiovascular: [x ] chest pain, [ ]palpitations, [ ]dizziness,  [ ]leg swelling[ ]orthopnea[ ]PND  Gastrointestinal: [ ] abdominal pain, [ ]nausea, [ ]vomiting,  [ ]diarrhea   Genitourinary: [ ] dysuria, [x ] hematuria  Neurologic: [ ] headaches [ ] tremors[ ]weakness  Skin: [ ] itching, [ ]burning, [ ] rashes  Endocrine: [ ] heat or cold intolerance  Musculoskeletal: [ ] joint pain or swelling; [ ] muscle, back, or extremity pain  Psychiatric: [ ] depression, [ ]anxiety, [ ]mood swings, or [ ]difficulty sleeping  Hematologic: [ ] easy bruising, [ ] bleeding gums    [x] All remaining systems negative except as per above.   [ ]Unable to obtain.    Vital Signs Last 24 Hrs  T(C): 36.7 (19 May 2020 23:57), Max: 36.7 (19 May 2020 11:31)  T(F): 98.1 (19 May 2020 23:57), Max: 98.1 (19 May 2020 23:57)  HR: 55 (19 May 2020 23:57) (54 - 64)  BP: 125/42 (19 May 2020 23:57) (100/34 - 134/44)  BP(mean): --  RR: 16 (19 May 2020 23:57) (16 - 18)  SpO2: 100% (19 May 2020 23:57) (96% - 100%)  I&O's Summary    19 May 2020 07:01  -  20 May 2020 07:00  --------------------------------------------------------  IN: 150 mL / OUT: 700 mL / NET: -550 mL        PHYSICAL EXAM:  General: No acute distress BMI-26.1  HEENT: EOMI, PERRL  Neck: Supple, [ ] JVD  Lungs: Equal air entry bilaterally; [ ] rales [ ] wheezing [ ] rhonchi  Heart: Regular rate and rhythm; [x ] murmur  2 /6 [2 ] systolic [ ] diastolic [ ] radiation[ ] rubs [ ]  gallops  Abdomen: Nontender, bowel sounds present  Extremities: No clubbing, cyanosis, [x ] edema  Nervous system:  Alert & Oriented X3, no focal deficits  Psychiatric: Normal affect  Skin: No rashes or lesions    LABS:  05-19    136  |  103  |  37<H>  ----------------------------<  194<H>  4.6   |  23  |  1.93<H>    Ca    8.6      19 May 2020 22:24  Phos  3.6     05-19  Mg     2.1     05-19      Creatinine Trend: 1.93<--, 1.62<--, 1.59<--, 1.95<--, 1.72<--, 1.87<--                        8.6    10.38 )-----------( 210      ( 19 May 2020 22:24 )             25.9     PT/INR - ( 19 May 2020 22:24 )   PT: 13.0 SEC;   INR: 1.14          PTT - ( 19 May 2020 22:24 )  PTT:26.5 SEC         Cardiac Enzymes: CARDIAC MARKERS ( 19 May 2020 06:26 )  2.430 ng/mL / x     / x     / x     / x      CARDIAC MARKERS ( 18 May 2020 07:04 )  0.287 ng/mL / x     / x     / x     / x          COVID-19 PCR . (05.18.20 @ 22:39)    COVID-19 PCR: NotDetected    ECHO:  1. Tracemitral regurgitation.  2.  Mild aortic regurgitation.  3. Normal left ventricular internal dimensions and wall thicknesses.  4. Endocardium not well visualized; grossly mild segmental left ventricular systolic dysfunction.  Although not all  myocardial segments are well visualized, the basal to mid anteroseptum appears hypokinetic.  5. Normal right ventricular size and function.  6. RV systolic pressure is 52 mm Hg. Moderate pulmonary hypertension.    IMPRESSION AND PLAN:    Problem/Plan - 1:  ·  Problem: Hematuria.  Plan: Patient has chronic  hematuria, CBI was initiated again.  -s/p 4 unit pRBC, hgb now stable- 8.6  - Infante with red tinged urine,  - Continue with CBI as needed for shawna hematuria  -For  IR embolization        Problem/Plan - 2:  ·  Problem: Chest pain-NSTEMI.  Plan: Original plans for transfer for cardiac cath, however due to persistent acute blood loss anemia.  Hx CAD  Continue nitrates Aspirin statins   Possible cath after IR embolization        Problem/Plan - 3:  ·  Problem: CAD (Coronary Artery Disease) Type 2 MI-Demand.  Plan: Continue with Asprin, plavix dced  Due to persistent hematuria.   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

## 2020-05-20 NOTE — CHART NOTE - NSCHARTNOTEFT_GEN_A_CORE
Discussed case with cardiology attending Dr. Weinberg.  As per Dr. Weinberg, patient is cleared from a cardiac perspective for IR embolization of prostatic artery.

## 2020-05-21 LAB
ALBUMIN SERPL ELPH-MCNC: 3.1 G/DL — LOW (ref 3.3–5)
ALP SERPL-CCNC: 71 U/L — SIGNIFICANT CHANGE UP (ref 40–120)
ALT FLD-CCNC: 34 U/L — SIGNIFICANT CHANGE UP (ref 4–41)
ANION GAP SERPL CALC-SCNC: 10 MMO/L — SIGNIFICANT CHANGE UP (ref 7–14)
ANION GAP SERPL CALC-SCNC: 10 MMO/L — SIGNIFICANT CHANGE UP (ref 7–14)
APTT BLD: 27.7 SEC — SIGNIFICANT CHANGE UP (ref 27.5–36.3)
AST SERPL-CCNC: 23 U/L — SIGNIFICANT CHANGE UP (ref 4–40)
BASOPHILS # BLD AUTO: 0.02 K/UL — SIGNIFICANT CHANGE UP (ref 0–0.2)
BASOPHILS # BLD AUTO: 0.03 K/UL — SIGNIFICANT CHANGE UP (ref 0–0.2)
BASOPHILS NFR BLD AUTO: 0.3 % — SIGNIFICANT CHANGE UP (ref 0–2)
BASOPHILS NFR BLD AUTO: 0.4 % — SIGNIFICANT CHANGE UP (ref 0–2)
BILIRUB SERPL-MCNC: 0.4 MG/DL — SIGNIFICANT CHANGE UP (ref 0.2–1.2)
BUN SERPL-MCNC: 28 MG/DL — HIGH (ref 7–23)
BUN SERPL-MCNC: 30 MG/DL — HIGH (ref 7–23)
CALCIUM SERPL-MCNC: 8.3 MG/DL — LOW (ref 8.4–10.5)
CALCIUM SERPL-MCNC: 8.4 MG/DL — SIGNIFICANT CHANGE UP (ref 8.4–10.5)
CHLORIDE SERPL-SCNC: 106 MMOL/L — SIGNIFICANT CHANGE UP (ref 98–107)
CHLORIDE SERPL-SCNC: 108 MMOL/L — HIGH (ref 98–107)
CO2 SERPL-SCNC: 21 MMOL/L — LOW (ref 22–31)
CO2 SERPL-SCNC: 22 MMOL/L — SIGNIFICANT CHANGE UP (ref 22–31)
CREAT SERPL-MCNC: 1.48 MG/DL — HIGH (ref 0.5–1.3)
CREAT SERPL-MCNC: 1.68 MG/DL — HIGH (ref 0.5–1.3)
EOSINOPHIL # BLD AUTO: 0.17 K/UL — SIGNIFICANT CHANGE UP (ref 0–0.5)
EOSINOPHIL # BLD AUTO: 0.29 K/UL — SIGNIFICANT CHANGE UP (ref 0–0.5)
EOSINOPHIL NFR BLD AUTO: 2.2 % — SIGNIFICANT CHANGE UP (ref 0–6)
EOSINOPHIL NFR BLD AUTO: 4 % — SIGNIFICANT CHANGE UP (ref 0–6)
GLUCOSE SERPL-MCNC: 129 MG/DL — HIGH (ref 70–99)
GLUCOSE SERPL-MCNC: 166 MG/DL — HIGH (ref 70–99)
HCT VFR BLD CALC: 25.2 % — LOW (ref 39–50)
HCT VFR BLD CALC: 25.4 % — LOW (ref 39–50)
HCT VFR BLD CALC: 31.8 % — LOW (ref 39–50)
HGB BLD-MCNC: 10.8 G/DL — LOW (ref 13–17)
HGB BLD-MCNC: 8.3 G/DL — LOW (ref 13–17)
HGB BLD-MCNC: 8.4 G/DL — LOW (ref 13–17)
IMM GRANULOCYTES NFR BLD AUTO: 0.5 % — SIGNIFICANT CHANGE UP (ref 0–1.5)
IMM GRANULOCYTES NFR BLD AUTO: 0.6 % — SIGNIFICANT CHANGE UP (ref 0–1.5)
INR BLD: 1.08 — SIGNIFICANT CHANGE UP (ref 0.88–1.17)
LYMPHOCYTES # BLD AUTO: 1.42 K/UL — SIGNIFICANT CHANGE UP (ref 1–3.3)
LYMPHOCYTES # BLD AUTO: 1.63 K/UL — SIGNIFICANT CHANGE UP (ref 1–3.3)
LYMPHOCYTES # BLD AUTO: 19.5 % — SIGNIFICANT CHANGE UP (ref 13–44)
LYMPHOCYTES # BLD AUTO: 21.4 % — SIGNIFICANT CHANGE UP (ref 13–44)
MAGNESIUM SERPL-MCNC: 2 MG/DL — SIGNIFICANT CHANGE UP (ref 1.6–2.6)
MCHC RBC-ENTMCNC: 31 PG — SIGNIFICANT CHANGE UP (ref 27–34)
MCHC RBC-ENTMCNC: 31.2 PG — SIGNIFICANT CHANGE UP (ref 27–34)
MCHC RBC-ENTMCNC: 32 PG — SIGNIFICANT CHANGE UP (ref 27–34)
MCHC RBC-ENTMCNC: 32.9 % — SIGNIFICANT CHANGE UP (ref 32–36)
MCHC RBC-ENTMCNC: 33.1 % — SIGNIFICANT CHANGE UP (ref 32–36)
MCHC RBC-ENTMCNC: 34 % — SIGNIFICANT CHANGE UP (ref 32–36)
MCV RBC AUTO: 94 FL — SIGNIFICANT CHANGE UP (ref 80–100)
MCV RBC AUTO: 94.1 FL — SIGNIFICANT CHANGE UP (ref 80–100)
MCV RBC AUTO: 94.4 FL — SIGNIFICANT CHANGE UP (ref 80–100)
MONOCYTES # BLD AUTO: 0.56 K/UL — SIGNIFICANT CHANGE UP (ref 0–0.9)
MONOCYTES # BLD AUTO: 0.68 K/UL — SIGNIFICANT CHANGE UP (ref 0–0.9)
MONOCYTES NFR BLD AUTO: 7.3 % — SIGNIFICANT CHANGE UP (ref 2–14)
MONOCYTES NFR BLD AUTO: 9.4 % — SIGNIFICANT CHANGE UP (ref 2–14)
NEUTROPHILS # BLD AUTO: 4.81 K/UL — SIGNIFICANT CHANGE UP (ref 1.8–7.4)
NEUTROPHILS # BLD AUTO: 5.21 K/UL — SIGNIFICANT CHANGE UP (ref 1.8–7.4)
NEUTROPHILS NFR BLD AUTO: 66.1 % — SIGNIFICANT CHANGE UP (ref 43–77)
NEUTROPHILS NFR BLD AUTO: 68.3 % — SIGNIFICANT CHANGE UP (ref 43–77)
NRBC # FLD: 0 K/UL — SIGNIFICANT CHANGE UP (ref 0–0)
PLATELET # BLD AUTO: 205 K/UL — SIGNIFICANT CHANGE UP (ref 150–400)
PLATELET # BLD AUTO: 211 K/UL — SIGNIFICANT CHANGE UP (ref 150–400)
PLATELET # BLD AUTO: 212 K/UL — SIGNIFICANT CHANGE UP (ref 150–400)
PMV BLD: 9 FL — SIGNIFICANT CHANGE UP (ref 7–13)
PMV BLD: 9 FL — SIGNIFICANT CHANGE UP (ref 7–13)
PMV BLD: 9.3 FL — SIGNIFICANT CHANGE UP (ref 7–13)
POTASSIUM SERPL-MCNC: 4.1 MMOL/L — SIGNIFICANT CHANGE UP (ref 3.5–5.3)
POTASSIUM SERPL-MCNC: 4.3 MMOL/L — SIGNIFICANT CHANGE UP (ref 3.5–5.3)
POTASSIUM SERPL-SCNC: 4.1 MMOL/L — SIGNIFICANT CHANGE UP (ref 3.5–5.3)
POTASSIUM SERPL-SCNC: 4.3 MMOL/L — SIGNIFICANT CHANGE UP (ref 3.5–5.3)
PROT SERPL-MCNC: 5.7 G/DL — LOW (ref 6–8.3)
PROTHROM AB SERPL-ACNC: 12.4 SEC — SIGNIFICANT CHANGE UP (ref 9.8–13.1)
RBC # BLD: 2.68 M/UL — LOW (ref 4.2–5.8)
RBC # BLD: 2.69 M/UL — LOW (ref 4.2–5.8)
RBC # BLD: 3.38 M/UL — LOW (ref 4.2–5.8)
RBC # FLD: 15.9 % — HIGH (ref 10.3–14.5)
RBC # FLD: 16.3 % — HIGH (ref 10.3–14.5)
RBC # FLD: 16.3 % — HIGH (ref 10.3–14.5)
SARS-COV-2 RNA SPEC QL NAA+PROBE: SIGNIFICANT CHANGE UP
SODIUM SERPL-SCNC: 137 MMOL/L — SIGNIFICANT CHANGE UP (ref 135–145)
SODIUM SERPL-SCNC: 140 MMOL/L — SIGNIFICANT CHANGE UP (ref 135–145)
TROPONIN T, HIGH SENSITIVITY: 283 NG/L — CRITICAL HIGH (ref ?–14)
WBC # BLD: 10.11 K/UL — SIGNIFICANT CHANGE UP (ref 3.8–10.5)
WBC # BLD: 7.27 K/UL — SIGNIFICANT CHANGE UP (ref 3.8–10.5)
WBC # BLD: 7.63 K/UL — SIGNIFICANT CHANGE UP (ref 3.8–10.5)
WBC # FLD AUTO: 10.11 K/UL — SIGNIFICANT CHANGE UP (ref 3.8–10.5)
WBC # FLD AUTO: 7.27 K/UL — SIGNIFICANT CHANGE UP (ref 3.8–10.5)
WBC # FLD AUTO: 7.63 K/UL — SIGNIFICANT CHANGE UP (ref 3.8–10.5)

## 2020-05-21 PROCEDURE — 36247 INS CATH ABD/L-EXT ART 3RD: CPT

## 2020-05-21 PROCEDURE — 76380 CAT SCAN FOLLOW-UP STUDY: CPT | Mod: 26,XU

## 2020-05-21 PROCEDURE — 93010 ELECTROCARDIOGRAM REPORT: CPT

## 2020-05-21 PROCEDURE — 37244 VASC EMBOLIZE/OCCLUDE BLEED: CPT

## 2020-05-21 PROCEDURE — 99231 SBSQ HOSP IP/OBS SF/LOW 25: CPT

## 2020-05-21 PROCEDURE — 36246 INS CATH ABD/L-EXT ART 2ND: CPT | Mod: XS

## 2020-05-21 PROCEDURE — 76937 US GUIDE VASCULAR ACCESS: CPT | Mod: 26

## 2020-05-21 RX ORDER — SODIUM CHLORIDE 9 MG/ML
1000 INJECTION INTRAMUSCULAR; INTRAVENOUS; SUBCUTANEOUS
Refills: 0 | Status: COMPLETED | OUTPATIENT
Start: 2020-05-21 | End: 2020-05-21

## 2020-05-21 RX ADMIN — CEFTRIAXONE 100 MILLIGRAM(S): 500 INJECTION, POWDER, FOR SOLUTION INTRAMUSCULAR; INTRAVENOUS at 07:00

## 2020-05-21 RX ADMIN — Medication 1 DROP(S): at 23:00

## 2020-05-21 RX ADMIN — FINASTERIDE 5 MILLIGRAM(S): 5 TABLET, FILM COATED ORAL at 12:09

## 2020-05-21 RX ADMIN — Medication 325 MILLIGRAM(S): at 12:09

## 2020-05-21 RX ADMIN — PANTOPRAZOLE SODIUM 40 MILLIGRAM(S): 20 TABLET, DELAYED RELEASE ORAL at 07:00

## 2020-05-21 RX ADMIN — LIDOCAINE 1 PATCH: 4 CREAM TOPICAL at 12:12

## 2020-05-21 RX ADMIN — Medication 1 MILLIGRAM(S): at 12:09

## 2020-05-21 RX ADMIN — Medication 112 MICROGRAM(S): at 07:00

## 2020-05-21 RX ADMIN — LIDOCAINE 1 PATCH: 4 CREAM TOPICAL at 20:23

## 2020-05-21 RX ADMIN — SODIUM CHLORIDE 3 MILLILITER(S): 9 INJECTION INTRAMUSCULAR; INTRAVENOUS; SUBCUTANEOUS at 05:20

## 2020-05-21 RX ADMIN — Medication 1000 UNIT(S): at 12:09

## 2020-05-21 RX ADMIN — SODIUM CHLORIDE 250 MILLILITER(S): 9 INJECTION INTRAMUSCULAR; INTRAVENOUS; SUBCUTANEOUS at 12:32

## 2020-05-21 RX ADMIN — Medication 81 MILLIGRAM(S): at 12:09

## 2020-05-21 RX ADMIN — Medication 1 DROP(S): at 06:59

## 2020-05-21 RX ADMIN — LIDOCAINE 1 PATCH: 4 CREAM TOPICAL at 05:52

## 2020-05-21 RX ADMIN — SODIUM CHLORIDE 3 MILLILITER(S): 9 INJECTION INTRAMUSCULAR; INTRAVENOUS; SUBCUTANEOUS at 20:23

## 2020-05-21 NOTE — CHART NOTE - NSCHARTNOTEFT_GEN_A_CORE
PRE-INTERVENTIONAL RADIOLOGY PROCEDURE NOTE      Patient Age: 87    Patient Gender: Male    Procedure: embolization prostatic artery    Diagnosis/Indication: Hematuria     Interventional Radiology Attending Physician: Dr. Wiley    Ordering Attending Physician: Dr. Weinberg     Pertinent Medical History: CAD, HTN, hypothyroid, neurogenic bladder, BPH, chronic acevedo, DM, CKD     Pertinent labs:                      8.3    7.27  )-----------( 212      ( 21 May 2020 03:40 )             25.2       05-21    140  |  108<H>  |  30<H>  ----------------------------<  129<H>  4.3   |  22  |  1.68<H>    Ca    8.4      21 May 2020 03:40  Phos  3.5     05-20  Mg     2.0     05-21        PT/INR - ( 19 May 2020 22:24 )   PT: 13.0 SEC;   INR: 1.14          PTT - ( 19 May 2020 22:24 )  PTT:26.5 SEC        Patient and Family Aware ? Yes

## 2020-05-21 NOTE — PROGRESS NOTE ADULT - SUBJECTIVE AND OBJECTIVE BOX
Subjective    Seen & examined at bedside  No acute events overnight  Plan for PAE today    Objective    Vital signs  T(F): , Max: 98.9 (05-21-20 @ 05:58)  HR: 64 (05-21-20 @ 05:58)  BP: 90/57 (05-21-20 @ 05:58)  SpO2: 97% (05-21-20 @ 05:58)  Wt(kg): --    Output     05-20 @ 07:01  -  05-21 @ 07:00  --------------------------------------------------------  IN: 0 mL / OUT: 675 mL / NET: -675 mL    Physical Exam  Gen NAD  Abd soft, NT, ND   Infante secured, draining clear rust colored urine with sediment    Labs  05-21 @ 03:40  WBC 7.27  / Hct 25.2  / SCr 1.68     05-20 @ 12:50  WBC 7.93  / Hct 27.2  / SCr --       Urine Cx: Culture - Urine (05.16.20 @ 22:48)    Specimen Source: .Urine Catheterized    Culture Results:   <10,000 CFU/mL Normal Urogenital Ariela    Imaging  < from: CT Abdomen and Pelvis No Cont (05.12.20 @ 15:04) >  IMPRESSION:     No urinary tract calculi or hydronephrosis.    Large blood clot in the bladder lumen. Follow-up cystoscopy to exclude an underlying mass.    Markedly Enlarged prostate with intravesical component of BPH.    < end of copied text > Subjective: blood in urine.     Seen & examined at bedside  No acute events overnight  Plan for PAE today    Objective    Vital signs  T(F): , Max: 98.9 (05-21-20 @ 05:58)  HR: 64 (05-21-20 @ 05:58)  BP: 90/57 (05-21-20 @ 05:58)  SpO2: 97% (05-21-20 @ 05:58)  Wt(kg): --    Output     05-20 @ 07:01  -  05-21 @ 07:00  --------------------------------------------------------  IN: 0 mL / OUT: 675 mL / NET: -675 mL    Physical Exam  Gen NAD  Abd soft, NT, ND   Infante secured, draining clear rust colored urine with sediment    Labs  05-21 @ 03:40  WBC 7.27  / Hct 25.2  / SCr 1.68     05-20 @ 12:50  WBC 7.93  / Hct 27.2  / SCr --       Urine Cx: Culture - Urine (05.16.20 @ 22:48)    Specimen Source: .Urine Catheterized    Culture Results:   <10,000 CFU/mL Normal Urogenital Ariela    Imaging  < from: CT Abdomen and Pelvis No Cont (05.12.20 @ 15:04) >  IMPRESSION:     No urinary tract calculi or hydronephrosis.    Large blood clot in the bladder lumen. Follow-up cystoscopy to exclude an underlying mass.    Markedly Enlarged prostate with intravesical component of BPH.    < end of copied text >

## 2020-05-21 NOTE — PROGRESS NOTE ADULT - SUBJECTIVE AND OBJECTIVE BOX
Note    IR Post Procedure Check    s/p prostate artery embolization  post procedure chest pain and EKG changes, scheduled for cardiac cath tomorrow.     Pt seen and examined without complaints, no chest pain currently, no abdominal or groin pain.     Vital Signs Last 24 Hrs  T(C): 37 (21 May 2020 18:30), Max: 37.2 (21 May 2020 05:58)  T(F): 98.6 (21 May 2020 18:30), Max: 98.9 (21 May 2020 05:58)  HR: 76 (21 May 2020 18:30) (57 - 76)  BP: 99/52 (21 May 2020 18:30) (80/50 - 135/73)  BP(mean): --  RR: 18 (21 May 2020 18:30) (18 - 18)  SpO2: 99% (21 May 2020 18:30) (97% - 100%)    I&O's Summary    20 May 2020 07:01  -  21 May 2020 07:00  --------------------------------------------------------  IN: 0 mL / OUT: 675 mL / NET: -675 mL    PHYSICAL EXAM:   Constitutional: well appearing, sitting up in bed comfortably, eating dinner    Respiratory: clear    Cardiovascular: regular    Gastrointestinal: soft, nontender    Genitourinary: right groin site without tenderness, no visible ecchymosis or edema.  Infante in place, draining well, strawberry colored urine.     Extremities: venodynes in place.    LABS:                        8.4    7.63  )-----------( 211      ( 21 May 2020 15:40 )             25.4       05-21    137  |  106  |  28<H>  ----------------------------<  166<H>  4.1   |  21<L>  |  1.48<H>    Ca    8.3<L>      21 May 2020 15:40  Phos  3.5     05-20  Mg     2.0     05-21    TPro  5.7<L>  /  Alb  3.1<L>  /  TBili  0.4  /  DBili  x   /  AST  23  /  ALT  34  /  AlkPhos  71  05-21  Note  CC: blood in urine  IR Post Procedure Check    s/p prostate artery embolization  post procedure chest pain and EKG changes, scheduled for cardiac cath tomorrow.     Pt seen and examined without complaints, no chest pain currently, no abdominal or groin pain.     Vital Signs Last 24 Hrs  T(C): 37 (21 May 2020 18:30), Max: 37.2 (21 May 2020 05:58)  T(F): 98.6 (21 May 2020 18:30), Max: 98.9 (21 May 2020 05:58)  HR: 76 (21 May 2020 18:30) (57 - 76)  BP: 99/52 (21 May 2020 18:30) (80/50 - 135/73)  BP(mean): --  RR: 18 (21 May 2020 18:30) (18 - 18)  SpO2: 99% (21 May 2020 18:30) (97% - 100%)    I&O's Summary    20 May 2020 07:01  -  21 May 2020 07:00  --------------------------------------------------------  IN: 0 mL / OUT: 675 mL / NET: -675 mL    PHYSICAL EXAM:   Constitutional: well appearing, sitting up in bed comfortably, eating dinner    Respiratory: clear    Cardiovascular: regular    Gastrointestinal: soft, nontender    Genitourinary: right groin site without tenderness, no visible ecchymosis or edema.  Infante in place, draining well, strawberry colored urine.     Extremities: venodynes in place.    LABS:                        8.4    7.63  )-----------( 211      ( 21 May 2020 15:40 )             25.4       05-21    137  |  106  |  28<H>  ----------------------------<  166<H>  4.1   |  21<L>  |  1.48<H>    Ca    8.3<L>      21 May 2020 15:40  Phos  3.5     05-20  Mg     2.0     05-21    TPro  5.7<L>  /  Alb  3.1<L>  /  TBili  0.4  /  DBili  x   /  AST  23  /  ALT  34  /  AlkPhos  71  05-21

## 2020-05-21 NOTE — PROGRESS NOTE ADULT - SUBJECTIVE AND OBJECTIVE BOX
Vascular & Interventional Radiology Pre-Procedure Note    Procedure Name: Prostate Artery Embolization    HPI: 87M w/ PMHx of CAD s/p stent (0159-9104), HTN, Hypothyroid, BPH with chronic Infante s/p greenlight PVP 2010, DM, CKD presenting with hematuria and found to have NSTEMI on admission presenting for PAE.     Allergies: morphine (Nausea)    Medications:  aspirin enteric coated: 81 milliGRAM(s) Oral (05-21 @ 12:09)  cefTRIAXone   IVPB: 100 mL/Hr IV Intermittent (05-21 @ 07:00)  isosorbide   dinitrate Tablet (ISORDIL): 10 milliGRAM(s) Oral (05-20 @ 20:23)    Data:    T(C): 36.6  HR: 66  BP: 95/51  RR: 18  SpO2: 100%    -WBC 7.27 / HgB 8.3 / Hct 25.2 / Plt 212  -Na 140 / Cl 108 / BUN 30 / Glucose 129  -K 4.3 / CO2 22 / Cr 1.68  -ALT -- / Alk Phos -- / T.Bili --  -INR1.08    Imaging: CT A/P on 05/12/2020    Plan:   -87y Male presents for urgent PAE for hematuria of prostatic origin requiring blood transfusion. Patient recently has an NSTEMI and has been cleared by cardiology for this urgent procedure.   -Risks/Benefits/alternatives explained with the patient and witnessed informed consent obtained.

## 2020-05-21 NOTE — PROGRESS NOTE ADULT - ASSESSMENT
87 year old male s/p prostate embolization for hematuria, post op chest pain and EKG changes requiring cardiac cath tomorrow, currently stable.     -Strict I&O's  -continue acevedo for now, and monitor color.

## 2020-05-21 NOTE — CHART NOTE - NSCHARTNOTEFT_GEN_A_CORE
Called at the end of procedure by IR,  pt complained of mild midsternal chest pain at that time, ST depressions found on telemetry as per anesthesiologist. Procedure was successfully completed and pt was transported to recovery.     Pt seen and evaluated at bedside sp procedure in recovery, reported mid sternal chest lasted for one minute and has now resolved. denied sob, palps, dizziness at that time. Pt currently states, "I feel fine".   Vitals stable  2 units of blood ordered to be given now post procedure   EKG obtained, NSR no acute ST-T changes   Troponins ordered, will follow up results    Discussed case with Dr. Weinberg, plan is for cardiac cath tomorrow.   Will continue to monitor patient.

## 2020-05-21 NOTE — PROGRESS NOTE ADULT - SUBJECTIVE AND OBJECTIVE BOX
DATE OF SERVICE:Patient was seen and examined :05/21/2020    PRESENTING CC:Hematuria    SUBJ: 86 yo Male from Home w/ PMHx of CAD s/p stent ([id number]), HTN, Hypothyroid, Neurogenic Bladder and BPH with chronic Infante, T2DM, CKD and PSHx Green Light PVP 2010, presented to [facility] w/ hematuria on CBI transferred to [facility] for Prostate Artery Embolization,remains chest pain free      PMH -reviewed admission note, no change since admission  Heart failure: acute [ ] chronic [ ] acute or chronic [ ] diastolic [ ] systolic [ ] combined systolic and diastolic[ ]  TERI: ATN[ ] renal medullary necrosis [ ] CKD I [ ]CKDII [ ]CKD III [ ]CKD IV [ ]CKD V [ ]Other pathological lesions [ ]    MEDICATIONS  (STANDING):  artificial  tears Solution 1 Drop(s) Both EYES three times a day  aspirin enteric coated 81 milliGRAM(s) Oral daily  atorvastatin 40 milliGRAM(s) Oral at bedtime  cefTRIAXone   IVPB 1000 milliGRAM(s) IV Intermittent every 24 hours  cholecalciferol 1000 Unit(s) Oral daily  ferrous    sulfate 325 milliGRAM(s) Oral daily  finasteride 5 milliGRAM(s) Oral daily  folic acid 1 milliGRAM(s) Oral daily  isosorbide   dinitrate Tablet (ISORDIL) 10 milliGRAM(s) Oral three times a day  levothyroxine 112 MICROGram(s) Oral daily  lidocaine   Patch 1 Patch Transdermal daily  metoprolol tartrate 25 milliGRAM(s) Oral two times a day  pantoprazole    Tablet 40 milliGRAM(s) Oral before breakfast  senna 2 Tablet(s) Oral at bedtime  sodium chloride 0.9% lock flush 3 milliLiter(s) IV Push every 8 hours    MEDICATIONS  (PRN):  acetaminophen   Tablet .. 650 milliGRAM(s) Oral every 6 hours PRN Mild Pain (1 - 3), Moderate Pain (4 - 6), Severe Pain (7 - 10)  ondansetron Injectable 4 milliGRAM(s) IV Push once PRN Nausea and/or Vomiting  polyethylene glycol 3350 17 Gram(s) Oral daily PRN Constipation              REVIEW OF SYSTEMS:  Constitutional: [ ] fever, [ ]weight loss,  [x ]fatigue  Eyes: [ ] visual changes  Respiratory: [ ]shortness of breath;  [ ] cough, [ ]wheezing, [ ]chills, [ ]hemoptysis  Cardiovascular: [ ] chest pain, [ ]palpitations, [ ]dizziness,  [ ]leg swelling[ ]orthopnea[ ]PND  Gastrointestinal: [ ] abdominal pain, [ ]nausea, [ ]vomiting,  [ ]diarrhea   Genitourinary: [ ] dysuria, [ ] hematuria  Neurologic: [ ] headaches [ ] tremors[ ]weakness  Skin: [ ] itching, [ ]burning, [ ] rashes  Endocrine: [ ] heat or cold intolerance  Musculoskeletal: [ ] joint pain or swelling; [ ] muscle, back, or extremity pain  Psychiatric: [ ] depression, [ ]anxiety, [ ]mood swings, or [ ]difficulty sleeping  Hematologic: [ ] easy bruising, [ ] bleeding gums    [x] All remaining systems negative except as per above.   [ ]Unable to obtain.    Vital Signs Last 24 Hrs  T(C): 37.2 (21 May 2020 05:58), Max: 37.2 (21 May 2020 05:58)  T(F): 98.9 (21 May 2020 05:58), Max: 98.9 (21 May 2020 05:58)  HR: 64 (21 May 2020 05:58) (53 - 64)  BP: 90/57 (21 May 2020 05:58) (80/50 - 135/73)  RR: 18 (21 May 2020 05:58) (18 - 18)  SpO2: 97% (21 May 2020 05:58) (97% - 100%)  I&O's Summary    19 May 2020 07:01  -  20 May 2020 07:00  --------------------------------------------------------  IN: 150 mL / OUT: 700 mL / NET: -550 mL    20 May 2020 07:01  -  21 May 2020 06:26  --------------------------------------------------------  IN: 0 mL / OUT: 675 mL / NET: -675 mL        PHYSICAL EXAM:  General: No acute distress BMI-26.6  HEENT: EOMI, PERRL  Neck: Supple, [ ] JVD  Lungs: Equal air entry bilaterally; [ ] rales [ ] wheezing [ ] rhonchi  Heart: Regular rate and rhythm; [x ] murmur  2 /6 [x ] systolic [ ] diastolic [ ] radiation[ ] rubs [ ]  gallops  Abdomen: Nontender, bowel sounds present  Extremities: No clubbing, cyanosis, [ ] edema  Nervous system:  Alert & Oriented X3, no focal deficits  Psychiatric: Normal affect  Skin: No rashes or lesions    LABS:  05-21    140  |  108<H>  |  30<H>  ----------------------------<  129<H>  4.3   |  22  |  1.68<H>    Ca    8.4      21 May 2020 03:40  Phos  3.5     05-20  Mg     2.0     05-21      Creatinine Trend: 1.68<--, 1.82<--, 1.93<--, 1.62<--, 1.59<--, 1.95<--                        8.3    7.27  )-----------( 212      ( 21 May 2020 03:40 )             25.2     PT/INR - ( 19 May 2020 22:24 )   PT: 13.0 SEC;   INR: 1.14     PTT - ( 19 May 2020 22:24 )  PTT:26.5 SEC      COVID-19 PCR . (05.20.20 @ 16:13)    COVID-19 PCR: NotDetected        ECG [my interpretation]:Sinus rhythm anterior TWI    TELEMETRY:Sinus rhythm sinus bradycardia    ECHO:Study Date: 5/13/2020  1. Tracemitral regurgitation.  2.  Mild aortic regurgitation.  3. Normal left ventricular internal dimensions and wall thicknesses.  4. Endocardium not well visualized; grossly mild segmental left ventricular systolic dysfunction. EF 45-50%   Although not all myocardial segments are well visualized, the basal to mid anteroseptum appears hypokinetic.  5. Normal right ventricular size and function.  6. RV systolic pressure is 52 mm Hg. Moderate pulmonary hypertension.    IMPRESSION AND PLAN:      Problem/Plan - 1:  ·  Problem: Hematuria.  Plan: Patient has chronic  hematuria,  -s/p 4 unit pRBC, hgb now stable- 8.6 ---> 8.3  - Infante with red tinged urine,  -For  IR  Prostate Artey Embolization  -No cardiac contraindications at moderate cardiac risk for procedure        Problem/Plan - 2:  ·  Problem: Chest pain-NSTEMI.  Plan: Original plans for transfer for cardiac cath, however due to persistent acute blood loss anemia.  Hx CAD  Continue nitrates Aspirin statins -remains chest pain free  Possible cath after IR embolization if has recurrent Angina on medical therapy        Problem/Plan - 3:  ·  Problem: CAD (Coronary Artery Disease) Type 2 MI-Demand.  Plan: Continue with Asprin, plavix dced  Due to persistent hematuria.   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

## 2020-05-21 NOTE — PROVIDER CONTACT NOTE (OTHER) - RECOMMENDATIONS
continue to monitor, reassess in 4 hours.
Re-check BP and monitor for developing s/s.
hold AM bp meds

## 2020-05-21 NOTE — PROGRESS NOTE ADULT - ASSESSMENT
87M w/ PMHx of CAD s/p stent ([id number]), HTN, Hypothyroid, BPH with chronic Acevedo s/p greenlight PVP 2010, DM, CKD presenting with hematuria. Found to have NSTEMI on admission.  Transferred from  for prostate embolization, plan for today 5/21    - Discussed case with Dr. Wiley  - Plan to perform prostate embolization pending cardiac clearance documentation  - Scheduling per IR  - Continue acevedo catheter  - Monitor urine color  - No need for CBI at this time; irrigate prn

## 2020-05-21 NOTE — PROGRESS NOTE ADULT - SUBJECTIVE AND OBJECTIVE BOX
Vascular & Interventional Radiology Post-Procedure Note    Pre-Procedure Diagnosis: BPH   Post-Procedure Diagnosis: Same as pre.  Indications for Procedure: Hematuria    : Saurabh Rodriguez MD, MD    Procedure Details/Findings: Access via R-CFA. Left internal iliac arteriography shows complete occlusion of the left internal iliac arteriography. Right prostatic artery was identified and embolization was performed using 400um particles. Post embolization [facility] CT shows enhancement of the prostate and mild enhancement of the left corpora cavernosa corresponding to embolized territory. At the end of the procedure patient began having chest pain and EKG changes (ST depression). Procedure was completed and closure was performed by using 5F Mynx device. Right Groin soft, non tender, no hematoma. R Fem 2+ and R DP 1+.     Estimated Blood Loss: Minimal  Specimen: None   Contrast: See Report  Sedation: MAC  Patient Condition/Disposition: Recovery then Floor    Plan:     1) Follow-Up ordered CBC, BMP and Troponin    2) Performed 12 lead EKG    3) Recommend reconsultation of Cardiology    Discussed with primary team by  at the time of the procedure.       Meño Rodriguez MD  PGY-6, Interventional Radiology  Pager: [phone]

## 2020-05-21 NOTE — PROVIDER CONTACT NOTE (OTHER) - ACTION/TREATMENT ORDERED:
Provider aware. will do a repeat manual bp. Will continue to monitor. Provider aware. will do a repeat manual bp on other extremity. Will continue to monitor.

## 2020-05-22 ENCOUNTER — APPOINTMENT (OUTPATIENT)
Dept: CARDIOLOGY | Facility: HOSPITAL | Age: 85
End: 2020-05-22
Payer: MEDICARE

## 2020-05-22 LAB
ANION GAP SERPL CALC-SCNC: 10 MMO/L — SIGNIFICANT CHANGE UP (ref 7–14)
BUN SERPL-MCNC: 27 MG/DL — HIGH (ref 7–23)
CALCIUM SERPL-MCNC: 8.6 MG/DL — SIGNIFICANT CHANGE UP (ref 8.4–10.5)
CHLORIDE SERPL-SCNC: 108 MMOL/L — HIGH (ref 98–107)
CO2 SERPL-SCNC: 23 MMOL/L — SIGNIFICANT CHANGE UP (ref 22–31)
CREAT SERPL-MCNC: 1.73 MG/DL — HIGH (ref 0.5–1.3)
GLUCOSE SERPL-MCNC: 115 MG/DL — HIGH (ref 70–99)
HCT VFR BLD CALC: 30.4 % — LOW (ref 39–50)
HGB BLD-MCNC: 10.1 G/DL — LOW (ref 13–17)
MAGNESIUM SERPL-MCNC: 1.9 MG/DL — SIGNIFICANT CHANGE UP (ref 1.6–2.6)
MCHC RBC-ENTMCNC: 31.6 PG — SIGNIFICANT CHANGE UP (ref 27–34)
MCHC RBC-ENTMCNC: 33.2 % — SIGNIFICANT CHANGE UP (ref 32–36)
MCV RBC AUTO: 95 FL — SIGNIFICANT CHANGE UP (ref 80–100)
NRBC # FLD: 0 K/UL — SIGNIFICANT CHANGE UP (ref 0–0)
PLATELET # BLD AUTO: 198 K/UL — SIGNIFICANT CHANGE UP (ref 150–400)
PMV BLD: 9.4 FL — SIGNIFICANT CHANGE UP (ref 7–13)
POTASSIUM SERPL-MCNC: 4.4 MMOL/L — SIGNIFICANT CHANGE UP (ref 3.5–5.3)
POTASSIUM SERPL-SCNC: 4.4 MMOL/L — SIGNIFICANT CHANGE UP (ref 3.5–5.3)
RBC # BLD: 3.2 M/UL — LOW (ref 4.2–5.8)
RBC # FLD: 16.3 % — HIGH (ref 10.3–14.5)
SODIUM SERPL-SCNC: 141 MMOL/L — SIGNIFICANT CHANGE UP (ref 135–145)
TROPONIN T, HIGH SENSITIVITY: 332 NG/L — CRITICAL HIGH (ref ?–14)
TROPONIN T, HIGH SENSITIVITY: 335 NG/L — CRITICAL HIGH (ref ?–14)
WBC # BLD: 8.77 K/UL — SIGNIFICANT CHANGE UP (ref 3.8–10.5)
WBC # FLD AUTO: 8.77 K/UL — SIGNIFICANT CHANGE UP (ref 3.8–10.5)

## 2020-05-22 PROCEDURE — 93567 NJX CAR CTH SPRVLV AORTGRPHY: CPT

## 2020-05-22 PROCEDURE — 99231 SBSQ HOSP IP/OBS SF/LOW 25: CPT

## 2020-05-22 PROCEDURE — 93459 L HRT ART/GRFT ANGIO: CPT | Mod: 26

## 2020-05-22 RX ORDER — SODIUM CHLORIDE 9 MG/ML
500 INJECTION INTRAMUSCULAR; INTRAVENOUS; SUBCUTANEOUS
Refills: 0 | Status: DISCONTINUED | OUTPATIENT
Start: 2020-05-22 | End: 2020-05-23

## 2020-05-22 RX ORDER — SODIUM CHLORIDE 9 MG/ML
1000 INJECTION INTRAMUSCULAR; INTRAVENOUS; SUBCUTANEOUS
Refills: 0 | Status: DISCONTINUED | OUTPATIENT
Start: 2020-05-22 | End: 2020-05-22

## 2020-05-22 RX ADMIN — Medication 325 MILLIGRAM(S): at 08:50

## 2020-05-22 RX ADMIN — ISOSORBIDE DINITRATE 10 MILLIGRAM(S): 5 TABLET ORAL at 23:47

## 2020-05-22 RX ADMIN — SODIUM CHLORIDE 100 MILLILITER(S): 9 INJECTION INTRAMUSCULAR; INTRAVENOUS; SUBCUTANEOUS at 14:46

## 2020-05-22 RX ADMIN — SENNA PLUS 2 TABLET(S): 8.6 TABLET ORAL at 22:49

## 2020-05-22 RX ADMIN — ISOSORBIDE DINITRATE 10 MILLIGRAM(S): 5 TABLET ORAL at 06:36

## 2020-05-22 RX ADMIN — FINASTERIDE 5 MILLIGRAM(S): 5 TABLET, FILM COATED ORAL at 08:50

## 2020-05-22 RX ADMIN — ATORVASTATIN CALCIUM 40 MILLIGRAM(S): 80 TABLET, FILM COATED ORAL at 22:49

## 2020-05-22 RX ADMIN — Medication 1 DROP(S): at 22:49

## 2020-05-22 RX ADMIN — SENNA PLUS 2 TABLET(S): 8.6 TABLET ORAL at 04:54

## 2020-05-22 RX ADMIN — SODIUM CHLORIDE 75 MILLILITER(S): 9 INJECTION INTRAMUSCULAR; INTRAVENOUS; SUBCUTANEOUS at 08:48

## 2020-05-22 RX ADMIN — ISOSORBIDE DINITRATE 10 MILLIGRAM(S): 5 TABLET ORAL at 17:36

## 2020-05-22 RX ADMIN — CEFTRIAXONE 100 MILLIGRAM(S): 500 INJECTION, POWDER, FOR SOLUTION INTRAMUSCULAR; INTRAVENOUS at 06:36

## 2020-05-22 RX ADMIN — ATORVASTATIN CALCIUM 40 MILLIGRAM(S): 80 TABLET, FILM COATED ORAL at 04:51

## 2020-05-22 RX ADMIN — Medication 1 MILLIGRAM(S): at 08:50

## 2020-05-22 RX ADMIN — Medication 112 MICROGRAM(S): at 06:36

## 2020-05-22 RX ADMIN — LIDOCAINE 1 PATCH: 4 CREAM TOPICAL at 01:55

## 2020-05-22 RX ADMIN — SODIUM CHLORIDE 3 MILLILITER(S): 9 INJECTION INTRAMUSCULAR; INTRAVENOUS; SUBCUTANEOUS at 17:16

## 2020-05-22 RX ADMIN — SODIUM CHLORIDE 3 MILLILITER(S): 9 INJECTION INTRAMUSCULAR; INTRAVENOUS; SUBCUTANEOUS at 04:56

## 2020-05-22 RX ADMIN — SODIUM CHLORIDE 3 MILLILITER(S): 9 INJECTION INTRAMUSCULAR; INTRAVENOUS; SUBCUTANEOUS at 22:50

## 2020-05-22 RX ADMIN — Medication 1 DROP(S): at 04:56

## 2020-05-22 RX ADMIN — Medication 25 MILLIGRAM(S): at 17:36

## 2020-05-22 RX ADMIN — Medication 25 MILLIGRAM(S): at 06:36

## 2020-05-22 RX ADMIN — Medication 1000 UNIT(S): at 08:49

## 2020-05-22 RX ADMIN — Medication 81 MILLIGRAM(S): at 08:49

## 2020-05-22 RX ADMIN — ISOSORBIDE DINITRATE 10 MILLIGRAM(S): 5 TABLET ORAL at 04:54

## 2020-05-22 NOTE — PROGRESS NOTE ADULT - SUBJECTIVE AND OBJECTIVE BOX
DATE OF SERVICE:Patient was seen and examined :05/22/2020    PRESENTING CC:Hematuria    SUBJ: 88 yo Male from Home w/ PMHx of CAD s/p stent ([id number]), HTN, Hypothyroid, Neurogenic Bladder and BPH with chronic Infante, T2DM, CKD and PSHx Green Light PVP 2010, presented to [facility] w/ hematuria on CBI transferred to [facility] had Prostate Artery Embolization,did c/o chest pain noted elevated troponins had PRBC Tx remains CP free      PMH -reviewed admission note, no change since admission  Heart failure: acute [ ] chronic [ ] acute or chronic [ ] diastolic [ ] systolic [ ] combined systolic and diastolic[ ]  TERI: ATN[ ] renal medullary necrosis [ ] CKD I [ ]CKDII [ ]CKD III [ ]CKD IV [ ]CKD V [ ]Other pathological lesions [ ]    MEDICATIONS  (STANDING):  artificial  tears Solution 1 Drop(s) Both EYES three times a day  aspirin enteric coated 81 milliGRAM(s) Oral daily  atorvastatin 40 milliGRAM(s) Oral at bedtime  cefTRIAXone   IVPB 1000 milliGRAM(s) IV Intermittent every 24 hours  cholecalciferol 1000 Unit(s) Oral daily  ferrous    sulfate 325 milliGRAM(s) Oral daily  finasteride 5 milliGRAM(s) Oral daily  folic acid 1 milliGRAM(s) Oral daily  isosorbide   dinitrate Tablet (ISORDIL) 10 milliGRAM(s) Oral three times a day  levothyroxine 112 MICROGram(s) Oral daily  lidocaine   Patch 1 Patch Transdermal daily  metoprolol tartrate 25 milliGRAM(s) Oral two times a day  pantoprazole    Tablet 40 milliGRAM(s) Oral before breakfast  senna 2 Tablet(s) Oral at bedtime  sodium chloride 0.9% lock flush 3 milliLiter(s) IV Push every 8 hours    MEDICATIONS  (PRN):  acetaminophen   Tablet .. 650 milliGRAM(s) Oral every 6 hours PRN Mild Pain (1 - 3), Moderate Pain (4 - 6), Severe Pain (7 - 10)  ondansetron Injectable 4 milliGRAM(s) IV Push once PRN Nausea and/or Vomiting  polyethylene glycol 3350 17 Gram(s) Oral daily PRN Constipation              REVIEW OF SYSTEMS:  Constitutional: [ ] fever, [ ]weight loss,  [x ]fatigue  Eyes: [ ] visual changes  Respiratory: [ ]shortness of breath;  [ ] cough, [ ]wheezing, [ ]chills, [ ]hemoptysis  Cardiovascular: [ ] chest pain, [ ]palpitations, [ ]dizziness,  [ ]leg swelling[ ]orthopnea[ ]PND  Gastrointestinal: [ ] abdominal pain, [ ]nausea, [ ]vomiting,  [ ]diarrhea   Genitourinary: [ ] dysuria, [ ] hematuria  Neurologic: [ ] headaches [ ] tremors[ ]weakness  Skin: [ ] itching, [ ]burning, [ ] rashes  Endocrine: [ ] heat or cold intolerance  Musculoskeletal: [ ] joint pain or swelling; [ ] muscle, back, or extremity pain  Psychiatric: [ ] depression, [ ]anxiety, [ ]mood swings, or [ ]difficulty sleeping  Hematologic: [ ] easy bruising, [ ] bleeding gums    [x] All remaining systems negative except as per above.   [ ]Unable to obtain.    Vital Signs Last 24 Hrs  T(C): 36.9 (21 May 2020 23:00), Max: 37.1 (21 May 2020 09:50)  T(F): 98.5 (21 May 2020 23:00), Max: 98.7 (21 May 2020 09:50)  HR: 59 (21 May 2020 23:00) (59 - 76)  BP: 155/61 (21 May 2020 23:00) (95/51 - 155/61)  RR: 18 (21 May 2020 23:00) (18 - 18)  SpO2: 98% (21 May 2020 23:00) (98% - 100%)  I&O's Summary    21 May 2020 07:01  -  22 May 2020 07:00  --------------------------------------------------------  IN: 0 mL / OUT: 100 mL / NET: -100 mL        PHYSICAL EXAM:  General: No acute distress BMI-22.6  HEENT: EOMI, PERRL  Neck: Supple, [ ] JVD  Lungs: Equal air entry bilaterally; [ ] rales [ ] wheezing [ ] rhonchi  Heart: Regular rate and rhythm; [x ] murmur  2 /6 [x ] systolic [ ] diastolic [ ] radiation[ ] rubs [ ]  gallops  Abdomen: Nontender, bowel sounds present  Extremities: No clubbing, cyanosis, [ ] edema  Nervous system:  Alert & Oriented X3, no focal deficits  Psychiatric: Normal affect  Skin: No rashes or lesions    LABS:  05-21    137  |  106  |  28<H>  ----------------------------<  166<H>  4.1   |  21<L>  |  1.48<H>    Ca    8.3<L>      21 May 2020 15:40  Phos  3.5     05-20  Mg     2.0     05-21    TPro  5.7<L>  /  Alb  3.1<L>  /  TBili  0.4  /  DBili  x   /  AST  23  /  ALT  34  /  AlkPhos  71  05-21    Creatinine Trend: 1.48<--, 1.68<--, 1.82<--, 1.93<--, 1.62<--, 1.59<--                        10.8   10.11 )-----------( 205      ( 21 May 2020 23:19 )             31.8     PT/INR - ( 21 May 2020 09:46 )   PT: 12.4 SEC;   INR: 1.08     PTT - ( 21 May 2020 09:46 )  PTT:27.7 SEC      COVID-19 PCR . (05.20.20 @ 16:13)    COVID-19 PCR: NotDetected    Troponin T, High Sensitivity (05.21.20 @ 23:19)    Troponin T, High Sensitivity: 332 <--- Delta: 283 ng/L on 05/21/          RADIOLOGY:Procedure Details/Findings: Access via R-CFA. Left internal iliac arteriography shows complete occlusion of the left internal iliac arteriography. Right prostatic artery was identified and embolization was performed using 400um particles. Post embolization [facility] CT shows enhancement of the prostate and mild enhancement of the left corpora cavernosa corresponding to embolized territory.    ECG [my interpretation]:    ECHO:Study Date: 5/13/2020  1. Tracemitral regurgitation.  2.  Mild aortic regurgitation.  3. Normal left ventricular internal dimensions and wall thicknesses.  4. Endocardium not well visualized; grossly mild segmental left ventricular systolic dysfunction. EF 45-50%   Although not all myocardial segments are well visualized, the basal to mid anteroseptum appears hypokinetic.  5. Normal right ventricular size and function.  6. RV systolic pressure is 52 mm Hg. Moderate pulmonary hypertension.    IMPRESSION AND PLAN:    Problem/Plan - 1:  ·  Problem: Hematuria.  Plan: Patient has chronic  hematuria,  -s/p 5 unit pRBC, hgb now stable- 8.6 ---> 8.3 _--->10.8  - Infante with red tinged urine,  -s/p  IR  Prostate Artey Embolization        Problem/Plan - 2:  ·  Problem: Chest pain-NSTEMI.  Plan: Original plans for transfer for cardiac cath, however due to persistent acute blood loss anemia.  Hx CAD  Continue nitrates Aspirin statins   -Recurrent chest pain with troponin leak  -Will schedule cardiac cath today  -      Problem/Plan - 3:  ·  Problem: CAD (Coronary Artery Disease) s/p CABG Type 2 MI-Demand.  Plan: Continue with Asprin, plavix dced  Due to persistent hematuria.   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

## 2020-05-22 NOTE — PROGRESS NOTE ADULT - ASSESSMENT
87M w/ PMHx of CAD s/p stent ([id number]), HTN, Hypothyroid, BPH with chronic Acevedo s/p greenlight PVP 2010, DM, CKD presenting with hematuria. Found to have NSTEMI on admission.  Transferred from  for prostate embolization, plan for today 5/21    - s/p IR embolization.  Urine color is clear  - Chest pain post op consistent with STEMI.  Plan for cardiac angiogram today  - Will plan for TOV after recovered from heart issues  - Continue acevedo catheter  - Monitor urine color  - No need for CBI at this time; irrigate prn

## 2020-05-22 NOTE — PROGRESS NOTE ADULT - SUBJECTIVE AND OBJECTIVE BOX
Interventional Radiology Follow- Up Note      87y Male s/p PAE on 05/21/2020 in Interventional Radiology with . Patient seen and examined @ bedside. No complaints offered.    Vitals: T(F): 97.7 (05-22-20 @ 07:22), Max: 98.7 (05-21-20 @ 09:50)  HR: 68 (05-22-20 @ 07:22) (59 - 76)  BP: 125/77 (05-22-20 @ 07:22) (95/51 - 155/61)  RR: 17 (05-22-20 @ 07:22) (17 - 18)  SpO2: 97% (05-22-20 @ 07:22) (97% - 100%)  Wt(kg): --    LABS:                        10.1   8.77  )-----------( 198      ( 22 May 2020 06:15 )             30.4     05-22    141  |  108<H>  |  27<H>  ----------------------------<  115<H>  4.4   |  23  |  1.73<H>    Ca    8.6      22 May 2020 06:15  Phos  3.5     05-20  Mg     1.9     05-22    TPro  5.7<L>  /  Alb  3.1<L>  /  TBili  0.4  /  DBili  x   /  AST  23  /  ALT  34  /  AlkPhos  71  05-21    PT/INR - ( 21 May 2020 09:46 )   PT: 12.4 SEC;   INR: 1.08          PTT - ( 21 May 2020 09:46 )  PTT:27.7 SEC  I&O's Detail    21 May 2020 07:01  -  22 May 2020 07:00  --------------------------------------------------------  IN:  Total IN: 0 mL    OUT:    Voided: 100 mL  Total OUT: 100 mL    Total NET: -100 mL        PHYSICAL EXAM:    General: Nontoxic, in NAD  Neuro:  Alert & oriented x 3  Genitalia: No penile discoloration or tenderness.  Extremities: R Groin soft, non-tender no hematoma. R Fem 2+.     Urine is inge-brown with clot noted in acevedo tubing.     Impression: 87y Male s/p PAE on 05/21/2020    Plan:  -Patient to undergo Cardiac Cath.   -Ok from IR perspective to start ASA/Plavix as needed.   -No signs of penile ischemia  -Urine maybe inge/brown in color due to large residual clot noted in bladder. If urine and HgB does not improve can revisit issue of additional angiogram and embolization after cardiac catheterization.      Meño Rodriguez MD  PGY-6, Interventional Radiology  Pager: [phone]

## 2020-05-22 NOTE — CHART NOTE - NSCHARTNOTEFT_GEN_A_CORE
Status post Cath, left radial and femoral sites without bleeding or hematoma.  Dressing is intact.  Positive Pulses, Capillary refill less than two seconds.  Will continue to monitor.                                                                                                                                                  GENET Rascon, RPA-C [id number]

## 2020-05-22 NOTE — PROGRESS NOTE ADULT - SUBJECTIVE AND OBJECTIVE BOX
Subjective    Seen & examined at bedside  s/p IR embolization of prostate  Chest pain post operatively, plan for cardiac angiogram today    Objective    Vital signs  T(F): , Max: 98.6 (05-21-20 @ 18:30)  HR: 68 (05-22-20 @ 07:22)  BP: 125/77 (05-22-20 @ 07:22)  SpO2: 97% (05-22-20 @ 07:22)  Wt(kg): --    Output     05-21 @ 07:01  -  05-22 @ 07:00  --------------------------------------------------------  IN: 0 mL / OUT: 100 mL / NET: -100 mL    Physical Exam  Gen NAD  Abd soft, NT, ND   Infante secured, draining clear rust colored urine    Labs  05-22 @ 06:15  WBC 8.77  / Hct 30.4  / SCr 1.73     05-21 @ 23:19  WBC 10.11 / Hct 31.8  / SCr --       Urine Cx: Culture - Urine (05.16.20 @ 22:48)    Specimen Source: .Urine Catheterized    Culture Results:   <10,000 CFU/mL Normal Urogenital Ariela    Imaging  < from: CT Abdomen and Pelvis No Cont (05.12.20 @ 15:04) >  IMPRESSION:     No urinary tract calculi or hydronephrosis.    Large blood clot in the bladder lumen. Follow-up cystoscopy to exclude an underlying mass.    Markedly Enlarged prostate with intravesical component of BPH.    < end of copied text >

## 2020-05-23 LAB
ANION GAP SERPL CALC-SCNC: 10 MMO/L — SIGNIFICANT CHANGE UP (ref 7–14)
BUN SERPL-MCNC: 24 MG/DL — HIGH (ref 7–23)
CALCIUM SERPL-MCNC: 8.6 MG/DL — SIGNIFICANT CHANGE UP (ref 8.4–10.5)
CHLORIDE SERPL-SCNC: 108 MMOL/L — HIGH (ref 98–107)
CO2 SERPL-SCNC: 22 MMOL/L — SIGNIFICANT CHANGE UP (ref 22–31)
CREAT SERPL-MCNC: 1.55 MG/DL — HIGH (ref 0.5–1.3)
GLUCOSE SERPL-MCNC: 133 MG/DL — HIGH (ref 70–99)
HCT VFR BLD CALC: 28.6 % — LOW (ref 39–50)
HGB BLD-MCNC: 9.5 G/DL — LOW (ref 13–17)
MAGNESIUM SERPL-MCNC: 1.8 MG/DL — SIGNIFICANT CHANGE UP (ref 1.6–2.6)
MCHC RBC-ENTMCNC: 31.5 PG — SIGNIFICANT CHANGE UP (ref 27–34)
MCHC RBC-ENTMCNC: 33.2 % — SIGNIFICANT CHANGE UP (ref 32–36)
MCV RBC AUTO: 94.7 FL — SIGNIFICANT CHANGE UP (ref 80–100)
NRBC # FLD: 0 K/UL — SIGNIFICANT CHANGE UP (ref 0–0)
PLATELET # BLD AUTO: 183 K/UL — SIGNIFICANT CHANGE UP (ref 150–400)
PMV BLD: 9.3 FL — SIGNIFICANT CHANGE UP (ref 7–13)
POTASSIUM SERPL-MCNC: 4.2 MMOL/L — SIGNIFICANT CHANGE UP (ref 3.5–5.3)
POTASSIUM SERPL-SCNC: 4.2 MMOL/L — SIGNIFICANT CHANGE UP (ref 3.5–5.3)
RBC # BLD: 3.02 M/UL — LOW (ref 4.2–5.8)
RBC # FLD: 16 % — HIGH (ref 10.3–14.5)
SODIUM SERPL-SCNC: 140 MMOL/L — SIGNIFICANT CHANGE UP (ref 135–145)
WBC # BLD: 8.87 K/UL — SIGNIFICANT CHANGE UP (ref 3.8–10.5)
WBC # FLD AUTO: 8.87 K/UL — SIGNIFICANT CHANGE UP (ref 3.8–10.5)

## 2020-05-23 PROCEDURE — 99231 SBSQ HOSP IP/OBS SF/LOW 25: CPT

## 2020-05-23 RX ADMIN — LIDOCAINE 1 PATCH: 4 CREAM TOPICAL at 13:00

## 2020-05-23 RX ADMIN — Medication 1 MILLIGRAM(S): at 13:00

## 2020-05-23 RX ADMIN — Medication 112 MICROGRAM(S): at 05:11

## 2020-05-23 RX ADMIN — ISOSORBIDE DINITRATE 10 MILLIGRAM(S): 5 TABLET ORAL at 22:22

## 2020-05-23 RX ADMIN — Medication 1 DROP(S): at 05:10

## 2020-05-23 RX ADMIN — ISOSORBIDE DINITRATE 10 MILLIGRAM(S): 5 TABLET ORAL at 05:58

## 2020-05-23 RX ADMIN — Medication 81 MILLIGRAM(S): at 13:00

## 2020-05-23 RX ADMIN — LIDOCAINE 1 PATCH: 4 CREAM TOPICAL at 20:27

## 2020-05-23 RX ADMIN — Medication 1000 UNIT(S): at 13:00

## 2020-05-23 RX ADMIN — Medication 1 DROP(S): at 22:22

## 2020-05-23 RX ADMIN — SODIUM CHLORIDE 3 MILLILITER(S): 9 INJECTION INTRAMUSCULAR; INTRAVENOUS; SUBCUTANEOUS at 22:23

## 2020-05-23 RX ADMIN — ATORVASTATIN CALCIUM 40 MILLIGRAM(S): 80 TABLET, FILM COATED ORAL at 22:22

## 2020-05-23 RX ADMIN — PANTOPRAZOLE SODIUM 40 MILLIGRAM(S): 20 TABLET, DELAYED RELEASE ORAL at 05:11

## 2020-05-23 RX ADMIN — SODIUM CHLORIDE 3 MILLILITER(S): 9 INJECTION INTRAMUSCULAR; INTRAVENOUS; SUBCUTANEOUS at 13:01

## 2020-05-23 RX ADMIN — FINASTERIDE 5 MILLIGRAM(S): 5 TABLET, FILM COATED ORAL at 13:00

## 2020-05-23 RX ADMIN — CEFTRIAXONE 100 MILLIGRAM(S): 500 INJECTION, POWDER, FOR SOLUTION INTRAMUSCULAR; INTRAVENOUS at 05:57

## 2020-05-23 RX ADMIN — SENNA PLUS 2 TABLET(S): 8.6 TABLET ORAL at 22:22

## 2020-05-23 RX ADMIN — ISOSORBIDE DINITRATE 10 MILLIGRAM(S): 5 TABLET ORAL at 13:00

## 2020-05-23 RX ADMIN — Medication 25 MILLIGRAM(S): at 17:07

## 2020-05-23 RX ADMIN — Medication 1 DROP(S): at 13:00

## 2020-05-23 RX ADMIN — Medication 325 MILLIGRAM(S): at 13:00

## 2020-05-23 RX ADMIN — Medication 25 MILLIGRAM(S): at 05:58

## 2020-05-23 RX ADMIN — SODIUM CHLORIDE 3 MILLILITER(S): 9 INJECTION INTRAMUSCULAR; INTRAVENOUS; SUBCUTANEOUS at 05:11

## 2020-05-23 NOTE — PROVIDER CONTACT NOTE (OTHER) - ASSESSMENT
Patients BP is 98/53. Other vitals are stable and patient is asymptomatic.
/60 hr 62
Pt is alert and oriented. Pt denies headache, dizziness or any other s/s.
Pt reports feeling fine, no dizziness or discomfort
vs as noted and bp 157/40. Pt asymptomatic resting in bed.

## 2020-05-23 NOTE — PROGRESS NOTE ADULT - SUBJECTIVE AND OBJECTIVE BOX
Subjective    Seen & examined at bedside  s/p Cardiac angiogram yesterday  Urine color continues to clear    Objective    Vital signs  T(F): , Max: 97.7 (05-23-20 @ 05:04)  HR: 54 (05-23-20 @ 05:45)  BP: 159/50 (05-23-20 @ 05:45)  SpO2: 98% (05-23-20 @ 05:04)  Wt(kg): --    Output     05-22 @ 07:01  -  05-23 @ 07:00  --------------------------------------------------------  IN: 400 mL / OUT: 1400 mL / NET: -1000 mL    Physical Exam  Gen NAD  Abd soft, NT, ND   Infante draining clear rust colored urine, no hematuria or clots visualized    Labs  05-23 @ 07:10  WBC 8.87  / Hct 28.6  / SCr 1.55     05-22 @ 06:15  WBC 8.77  / Hct 30.4  / SCr 1.73     Urine Cx: Culture - Urine (05.16.20 @ 22:48)    Specimen Source: .Urine Catheterized    Culture Results:   <10,000 CFU/mL Normal Urogenital Ariela    Imaging  < from: CT Abdomen and Pelvis No Cont (05.12.20 @ 15:04) >  IMPRESSION:     No urinary tract calculi or hydronephrosis.    Large blood clot in the bladder lumen. Follow-up cystoscopy to exclude an underlying mass.    Markedly Enlarged prostate with intravesical component of BPH.    < end of copied text > Subjective; I feel fine.     Seen & examined at bedside  s/p Cardiac angiogram yesterday  Urine color continues to clear    Objective    Vital signs  T(F): , Max: 97.7 (05-23-20 @ 05:04)  HR: 54 (05-23-20 @ 05:45)  BP: 159/50 (05-23-20 @ 05:45)  SpO2: 98% (05-23-20 @ 05:04)  Wt(kg): --    Output     05-22 @ 07:01  -  05-23 @ 07:00  --------------------------------------------------------  IN: 400 mL / OUT: 1400 mL / NET: -1000 mL    Physical Exam  Gen NAD  Abd soft, NT, ND   Infante draining clear rust colored urine, no hematuria or clots visualized    Labs  05-23 @ 07:10  WBC 8.87  / Hct 28.6  / SCr 1.55     05-22 @ 06:15  WBC 8.77  / Hct 30.4  / SCr 1.73     Urine Cx: Culture - Urine (05.16.20 @ 22:48)    Specimen Source: .Urine Catheterized    Culture Results:   <10,000 CFU/mL Normal Urogenital Ariela    Imaging  < from: CT Abdomen and Pelvis No Cont (05.12.20 @ 15:04) >  IMPRESSION:     No urinary tract calculi or hydronephrosis.    Large blood clot in the bladder lumen. Follow-up cystoscopy to exclude an underlying mass.    Markedly Enlarged prostate with intravesical component of BPH.    < end of copied text >

## 2020-05-23 NOTE — PROGRESS NOTE ADULT - SUBJECTIVE AND OBJECTIVE BOX
DATE OF SERVICE:Patient was seen and examined :05/23/2020    PRESENTING CC:Hematuria    SUBJ: 86 yo Male from Home w/ PMHx of CAD s/p stent ([id number]), HTN, Hypothyroid, Neurogenic Bladder and BPH with chronic Infante, T2DM, CKD and PSHx Green Light PVP 2010, presented to [facility] w/ hematuria on CBI transferred to [facility] had Prostate Artery Embolization underwent  cardiac cath yesterday-severe Subclavian artery stenosis      [facility] -reviewed admission note, no change since admission  Heart failure: acute [ ] chronic [ ] acute or chronic [ ] diastolic [ ] systolic [ ] combined systolic and diastolic[ ]  TERI: ATN[ ] renal medullary necrosis [ ] CKD I [ ]CKDII [ ]CKD III [ ]CKD IV [ ]CKD V [ ]Other pathological lesions [ ]    MEDICATIONS  (STANDING):  artificial  tears Solution 1 Drop(s) Both EYES three times a day  aspirin enteric coated 81 milliGRAM(s) Oral daily  atorvastatin 40 milliGRAM(s) Oral at bedtime  cefTRIAXone   IVPB 1000 milliGRAM(s) IV Intermittent every 24 hours  cholecalciferol 1000 Unit(s) Oral daily  ferrous    sulfate 325 milliGRAM(s) Oral daily  finasteride 5 milliGRAM(s) Oral daily  folic acid 1 milliGRAM(s) Oral daily  isosorbide   dinitrate Tablet (ISORDIL) 10 milliGRAM(s) Oral three times a day  levothyroxine 112 MICROGram(s) Oral daily  lidocaine   Patch 1 Patch Transdermal daily  metoprolol tartrate 25 milliGRAM(s) Oral two times a day  pantoprazole    Tablet 40 milliGRAM(s) Oral before breakfast  senna 2 Tablet(s) Oral at bedtime  sodium chloride 0.9% lock flush 3 milliLiter(s) IV Push every 8 hours  sodium chloride 0.9%. 500 milliLiter(s) (100 mL/Hr) IV Continuous <Continuous>    MEDICATIONS  (PRN):  acetaminophen   Tablet .. 650 milliGRAM(s) Oral every 6 hours PRN Mild Pain (1 - 3), Moderate Pain (4 - 6), Severe Pain (7 - 10)  ondansetron Injectable 4 milliGRAM(s) IV Push once PRN Nausea and/or Vomiting  polyethylene glycol 3350 17 Gram(s) Oral daily PRN Constipation              REVIEW OF SYSTEMS:  Constitutional: [ ] fever, [ ]weight loss,  [ ]fatigue  Eyes: [ ] visual changes  Respiratory: [ ]shortness of breath;  [ ] cough, [ ]wheezing, [ ]chills, [ ]hemoptysis  Cardiovascular: [ ] chest pain, [ ]palpitations, [ ]dizziness,  [ ]leg swelling[ ]orthopnea[ ]PND  Gastrointestinal: [ ] abdominal pain, [ ]nausea, [ ]vomiting,  [ ]diarrhea   Genitourinary: [ ] dysuria, [ ] hematuria  Neurologic: [ ] headaches [ ] tremors[ ]weakness  Skin: [ ] itching, [ ]burning, [ ] rashes  Endocrine: [ ] heat or cold intolerance  Musculoskeletal: [ ] joint pain or swelling; [ ] muscle, back, or extremity pain  Psychiatric: [ ] depression, [ ]anxiety, [ ]mood swings, or [ ]difficulty sleeping  Hematologic: [ ] easy bruising, [ ] bleeding gums    [x] All remaining systems negative except as per above.   [ ]Unable to obtain.    Vital Signs Last 24 Hrs  T(C): 36.5 (23 May 2020 05:04), Max: 36.5 (23 May 2020 05:04)  T(F): 97.7 (23 May 2020 05:04), Max: 97.7 (23 May 2020 05:04)  HR: 54 (23 May 2020 05:45) (54 - 65)  BP: 159/50 (23 May 2020 05:45) (138/49 - 183/51)  BP(mean): --  RR: 18 (23 May 2020 05:04) (18 - 18)  SpO2: 98% (23 May 2020 05:04) (98% - 98%)  I&O's Summary    22 May 2020 07:01  -  23 May 2020 07:00  --------------------------------------------------------  IN: 400 mL / OUT: 1400 mL / NET: -1000 mL        PHYSICAL EXAM:  General: No acute distress BMI-  HEENT: EOMI, PERRL  Neck: Supple, [ ] JVD  Lungs: Equal air entry bilaterally; [ ] rales [ ] wheezing [ ] rhonchi  Heart: Regular rate and rhythm; [ ] murmur   /6 [ ] systolic [ ] diastolic [ ] radiation[ ] rubs [ ]  gallops  Abdomen: Nontender, bowel sounds present  Extremities: No clubbing, cyanosis, [ ] edema  Nervous system:  Alert & Oriented X3, no focal deficits  Psychiatric: Normal affect  Skin: No rashes or lesions    LABS:  05-23    140  |  108<H>  |  24<H>  ----------------------------<  133<H>  4.2   |  22  |  1.55<H>    Ca    8.6      23 May 2020 07:10  Mg     1.8     05-23    TPro  5.7<L>  /  Alb  3.1<L>  /  TBili  0.4  /  DBili  x   /  AST  23  /  ALT  34  /  AlkPhos  71  05-21    Creatinine Trend: 1.55<--, 1.73<--, 1.48<--, 1.68<--, 1.82<--, 1.93<--                        9.5    8.87  )-----------( 183      ( 23 May 2020 07:10 )             28.6     PT/INR - ( 21 May 2020 09:46 )   PT: 12.4 SEC;   INR: 1.08          PTT - ( 21 May 2020 09:46 )  PTT:27.7 SEC  Lipid Panel:   Cardiac Enzymes:           RADIOLOGY:    ECG [my interpretation]:    TELEMETRY:    ECHO:    STRESS TEST:    CATHETERIZATION:      IMPRESSION AND PLAN:

## 2020-05-23 NOTE — PROGRESS NOTE ADULT - ASSESSMENT
87M w/ PMHx of CAD s/p stent ([id number]), HTN, Hypothyroid, BPH with chronic Acevedo s/p greenlight PVP 2010, DM, CKD presenting with hematuria. Found to have NSTEMI on admission.  Transferred from  for prostate embolization    Now s/p PAE.  Urine is clearing.  NSTEMI and chest pain post op, s/p Cardiac angiogram.    - s/p IR embolization.  Urine color is clear  - No urologic contraindications to proceeding if angioplasty is needed and DAPT  - Patient in chronic retention, will need to keep acevedo catheter and follow up with Dr. Rangel as an outpatient  - Continue acevedo catheter  - Monitor urine color  - No need for CBI at this time; irrigate prn

## 2020-05-24 LAB
ANION GAP SERPL CALC-SCNC: 12 MMO/L — SIGNIFICANT CHANGE UP (ref 7–14)
BUN SERPL-MCNC: 23 MG/DL — SIGNIFICANT CHANGE UP (ref 7–23)
CALCIUM SERPL-MCNC: 8.5 MG/DL — SIGNIFICANT CHANGE UP (ref 8.4–10.5)
CHLORIDE SERPL-SCNC: 102 MMOL/L — SIGNIFICANT CHANGE UP (ref 98–107)
CO2 SERPL-SCNC: 21 MMOL/L — LOW (ref 22–31)
CREAT SERPL-MCNC: 1.66 MG/DL — HIGH (ref 0.5–1.3)
GLUCOSE SERPL-MCNC: 152 MG/DL — HIGH (ref 70–99)
HCT VFR BLD CALC: 29.3 % — LOW (ref 39–50)
HGB BLD-MCNC: 9.6 G/DL — LOW (ref 13–17)
MAGNESIUM SERPL-MCNC: 1.7 MG/DL — SIGNIFICANT CHANGE UP (ref 1.6–2.6)
MCHC RBC-ENTMCNC: 31 PG — SIGNIFICANT CHANGE UP (ref 27–34)
MCHC RBC-ENTMCNC: 32.8 % — SIGNIFICANT CHANGE UP (ref 32–36)
MCV RBC AUTO: 94.5 FL — SIGNIFICANT CHANGE UP (ref 80–100)
NRBC # FLD: 0 K/UL — SIGNIFICANT CHANGE UP (ref 0–0)
PLATELET # BLD AUTO: 173 K/UL — SIGNIFICANT CHANGE UP (ref 150–400)
PMV BLD: 9.2 FL — SIGNIFICANT CHANGE UP (ref 7–13)
POTASSIUM SERPL-MCNC: 4.2 MMOL/L — SIGNIFICANT CHANGE UP (ref 3.5–5.3)
POTASSIUM SERPL-SCNC: 4.2 MMOL/L — SIGNIFICANT CHANGE UP (ref 3.5–5.3)
RBC # BLD: 3.1 M/UL — LOW (ref 4.2–5.8)
RBC # FLD: 15.6 % — HIGH (ref 10.3–14.5)
SODIUM SERPL-SCNC: 135 MMOL/L — SIGNIFICANT CHANGE UP (ref 135–145)
WBC # BLD: 9.88 K/UL — SIGNIFICANT CHANGE UP (ref 3.8–10.5)
WBC # FLD AUTO: 9.88 K/UL — SIGNIFICANT CHANGE UP (ref 3.8–10.5)

## 2020-05-24 PROCEDURE — 99231 SBSQ HOSP IP/OBS SF/LOW 25: CPT

## 2020-05-24 RX ADMIN — Medication 25 MILLIGRAM(S): at 18:46

## 2020-05-24 RX ADMIN — PANTOPRAZOLE SODIUM 40 MILLIGRAM(S): 20 TABLET, DELAYED RELEASE ORAL at 05:04

## 2020-05-24 RX ADMIN — SODIUM CHLORIDE 3 MILLILITER(S): 9 INJECTION INTRAMUSCULAR; INTRAVENOUS; SUBCUTANEOUS at 21:48

## 2020-05-24 RX ADMIN — Medication 1000 UNIT(S): at 12:55

## 2020-05-24 RX ADMIN — FINASTERIDE 5 MILLIGRAM(S): 5 TABLET, FILM COATED ORAL at 12:55

## 2020-05-24 RX ADMIN — ATORVASTATIN CALCIUM 40 MILLIGRAM(S): 80 TABLET, FILM COATED ORAL at 22:10

## 2020-05-24 RX ADMIN — LIDOCAINE 1 PATCH: 4 CREAM TOPICAL at 12:54

## 2020-05-24 RX ADMIN — Medication 1 MILLIGRAM(S): at 12:56

## 2020-05-24 RX ADMIN — SENNA PLUS 2 TABLET(S): 8.6 TABLET ORAL at 22:10

## 2020-05-24 RX ADMIN — LIDOCAINE 1 PATCH: 4 CREAM TOPICAL at 19:00

## 2020-05-24 RX ADMIN — Medication 81 MILLIGRAM(S): at 12:56

## 2020-05-24 RX ADMIN — Medication 112 MICROGRAM(S): at 05:04

## 2020-05-24 RX ADMIN — Medication 325 MILLIGRAM(S): at 12:55

## 2020-05-24 RX ADMIN — ISOSORBIDE DINITRATE 10 MILLIGRAM(S): 5 TABLET ORAL at 13:28

## 2020-05-24 RX ADMIN — Medication 1 DROP(S): at 22:10

## 2020-05-24 RX ADMIN — ISOSORBIDE DINITRATE 10 MILLIGRAM(S): 5 TABLET ORAL at 22:10

## 2020-05-24 RX ADMIN — LIDOCAINE 1 PATCH: 4 CREAM TOPICAL at 02:08

## 2020-05-24 RX ADMIN — CEFTRIAXONE 100 MILLIGRAM(S): 500 INJECTION, POWDER, FOR SOLUTION INTRAMUSCULAR; INTRAVENOUS at 05:05

## 2020-05-24 RX ADMIN — SODIUM CHLORIDE 3 MILLILITER(S): 9 INJECTION INTRAMUSCULAR; INTRAVENOUS; SUBCUTANEOUS at 13:28

## 2020-05-24 RX ADMIN — ISOSORBIDE DINITRATE 10 MILLIGRAM(S): 5 TABLET ORAL at 05:04

## 2020-05-24 RX ADMIN — Medication 25 MILLIGRAM(S): at 05:04

## 2020-05-24 RX ADMIN — SODIUM CHLORIDE 3 MILLILITER(S): 9 INJECTION INTRAMUSCULAR; INTRAVENOUS; SUBCUTANEOUS at 05:03

## 2020-05-24 NOTE — PROGRESS NOTE ADULT - ASSESSMENT
87M w/ PMHx of CAD s/p stent ([id number]), HTN, Hypothyroid, BPH with chronic Acevedo s/p greenlight PVP 2010, DM, CKD presenting with hematuria. Found to have NSTEMI on admission.  Transferred from  for prostate embolization    Now s/p PAE.  Urine is clearing.  NSTEMI and chest pain post op, s/p Cardiac angiogram.    - s/p IR embolization.  Urine color is clear  - No urologic contraindications to proceeding if angioplasty is needed and DAPT  - Patient in chronic retention, will need to keep acevedo catheter and follow up with Dr. Rangel as an outpatient  - Continue acevedo catheter  - Monitor urine color  - No need for CBI at this time; irrigate prn

## 2020-05-24 NOTE — PROGRESS NOTE ADULT - SUBJECTIVE AND OBJECTIVE BOX
UROLOGY DAILY PROGRESS NOTE:     Subjective:    No events. Patient feels well. Tolerating catheter.    Objective:    NAD, awake and alert  Respirations nonlabored  Abdomen soft, nontender, nondistended  Infante urine clear inge    MEDICATIONS  (STANDING):  artificial  tears Solution 1 Drop(s) Both EYES three times a day  aspirin enteric coated 81 milliGRAM(s) Oral daily  atorvastatin 40 milliGRAM(s) Oral at bedtime  cefTRIAXone   IVPB 1000 milliGRAM(s) IV Intermittent every 24 hours  cholecalciferol 1000 Unit(s) Oral daily  ferrous    sulfate 325 milliGRAM(s) Oral daily  finasteride 5 milliGRAM(s) Oral daily  folic acid 1 milliGRAM(s) Oral daily  isosorbide   dinitrate Tablet (ISORDIL) 10 milliGRAM(s) Oral three times a day  levothyroxine 112 MICROGram(s) Oral daily  lidocaine   Patch 1 Patch Transdermal daily  metoprolol tartrate 25 milliGRAM(s) Oral two times a day  pantoprazole    Tablet 40 milliGRAM(s) Oral before breakfast  senna 2 Tablet(s) Oral at bedtime  sodium chloride 0.9% lock flush 3 milliLiter(s) IV Push every 8 hours    MEDICATIONS  (PRN):  acetaminophen   Tablet .. 650 milliGRAM(s) Oral every 6 hours PRN Mild Pain (1 - 3), Moderate Pain (4 - 6), Severe Pain (7 - 10)  ondansetron Injectable 4 milliGRAM(s) IV Push once PRN Nausea and/or Vomiting  polyethylene glycol 3350 17 Gram(s) Oral daily PRN Constipation      Vital Signs Last 24 Hrs  T(C): 36.3 (24 May 2020 11:40), Max: 37.3 (23 May 2020 22:18)  T(F): 97.3 (24 May 2020 11:40), Max: 99.1 (23 May 2020 22:18)  HR: 55 (24 May 2020 11:40) (55 - 63)  BP: 162/47 (24 May 2020 11:40) (143/49 - 184/55)  BP(mean): --  RR: 17 (24 May 2020 11:40) (17 - 18)  SpO2: 98% (24 May 2020 11:40) (98% - 100%)    I&O's Detail    23 May 2020 07:01  -  24 May 2020 07:00  --------------------------------------------------------  IN:  Total IN: 0 mL    OUT:    Indwelling Catheter - Urethral: 500 mL  Total OUT: 500 mL    Total NET: -500 mL              LABS:                        9.6    9.88  )-----------( 173      ( 24 May 2020 06:00 )             29.3     05-24    135  |  102  |  23  ----------------------------<  152<H>  4.2   |  21<L>  |  1.66<H>    Ca    8.5      24 May 2020 06:00  Mg     1.7     05-24 UROLOGY DAILY PROGRESS NOTE:     Subjective: patient sleeping, no pain.    No events. Patient feels well. Tolerating catheter.    Objective:    NAD, awake and alert  Respirations nonlabored  Abdomen soft, nontender, nondistended  Infante urine clear inge    MEDICATIONS  (STANDING):  artificial  tears Solution 1 Drop(s) Both EYES three times a day  aspirin enteric coated 81 milliGRAM(s) Oral daily  atorvastatin 40 milliGRAM(s) Oral at bedtime  cefTRIAXone   IVPB 1000 milliGRAM(s) IV Intermittent every 24 hours  cholecalciferol 1000 Unit(s) Oral daily  ferrous    sulfate 325 milliGRAM(s) Oral daily  finasteride 5 milliGRAM(s) Oral daily  folic acid 1 milliGRAM(s) Oral daily  isosorbide   dinitrate Tablet (ISORDIL) 10 milliGRAM(s) Oral three times a day  levothyroxine 112 MICROGram(s) Oral daily  lidocaine   Patch 1 Patch Transdermal daily  metoprolol tartrate 25 milliGRAM(s) Oral two times a day  pantoprazole    Tablet 40 milliGRAM(s) Oral before breakfast  senna 2 Tablet(s) Oral at bedtime  sodium chloride 0.9% lock flush 3 milliLiter(s) IV Push every 8 hours    MEDICATIONS  (PRN):  acetaminophen   Tablet .. 650 milliGRAM(s) Oral every 6 hours PRN Mild Pain (1 - 3), Moderate Pain (4 - 6), Severe Pain (7 - 10)  ondansetron Injectable 4 milliGRAM(s) IV Push once PRN Nausea and/or Vomiting  polyethylene glycol 3350 17 Gram(s) Oral daily PRN Constipation      Vital Signs Last 24 Hrs  T(C): 36.3 (24 May 2020 11:40), Max: 37.3 (23 May 2020 22:18)  T(F): 97.3 (24 May 2020 11:40), Max: 99.1 (23 May 2020 22:18)  HR: 55 (24 May 2020 11:40) (55 - 63)  BP: 162/47 (24 May 2020 11:40) (143/49 - 184/55)  BP(mean): --  RR: 17 (24 May 2020 11:40) (17 - 18)  SpO2: 98% (24 May 2020 11:40) (98% - 100%)    I&O's Detail    23 May 2020 07:01  -  24 May 2020 07:00  --------------------------------------------------------  IN:  Total IN: 0 mL    OUT:    Indwelling Catheter - Urethral: 500 mL  Total OUT: 500 mL    Total NET: -500 mL              LABS:                        9.6    9.88  )-----------( 173      ( 24 May 2020 06:00 )             29.3     05-24    135  |  102  |  23  ----------------------------<  152<H>  4.2   |  21<L>  |  1.66<H>    Ca    8.5      24 May 2020 06:00  Mg     1.7     05-24

## 2020-05-24 NOTE — PROGRESS NOTE ADULT - SUBJECTIVE AND OBJECTIVE BOX
DATE OF SERVICE:Patient was seen and examined :05/24/2020    PRESENTING CC:Hematuria    SUBJ:       PMH -reviewed admission note, no change since admission  Heart failure: acute [ ] chronic [ ] acute or chronic [ ] diastolic [ ] systolic [ ] combined systolic and diastolic[ ]  TERI: ATN[ ] renal medullary necrosis [ ] CKD I [ ]CKDII [ ]CKD III [ ]CKD IV [ ]CKD V [ ]Other pathological lesions [ ]    MEDICATIONS  (STANDING):  artificial  tears Solution 1 Drop(s) Both EYES three times a day  aspirin enteric coated 81 milliGRAM(s) Oral daily  atorvastatin 40 milliGRAM(s) Oral at bedtime  cefTRIAXone   IVPB 1000 milliGRAM(s) IV Intermittent every 24 hours  cholecalciferol 1000 Unit(s) Oral daily  ferrous    sulfate 325 milliGRAM(s) Oral daily  finasteride 5 milliGRAM(s) Oral daily  folic acid 1 milliGRAM(s) Oral daily  isosorbide   dinitrate Tablet (ISORDIL) 10 milliGRAM(s) Oral three times a day  levothyroxine 112 MICROGram(s) Oral daily  lidocaine   Patch 1 Patch Transdermal daily  metoprolol tartrate 25 milliGRAM(s) Oral two times a day  pantoprazole    Tablet 40 milliGRAM(s) Oral before breakfast  senna 2 Tablet(s) Oral at bedtime  sodium chloride 0.9% lock flush 3 milliLiter(s) IV Push every 8 hours    MEDICATIONS  (PRN):  acetaminophen   Tablet .. 650 milliGRAM(s) Oral every 6 hours PRN Mild Pain (1 - 3), Moderate Pain (4 - 6), Severe Pain (7 - 10)  ondansetron Injectable 4 milliGRAM(s) IV Push once PRN Nausea and/or Vomiting  polyethylene glycol 3350 17 Gram(s) Oral daily PRN Constipation              REVIEW OF SYSTEMS:  Constitutional: [ ] fever, [ ]weight loss,  [ ]fatigue  Eyes: [ ] visual changes  Respiratory: [ ]shortness of breath;  [ ] cough, [ ]wheezing, [ ]chills, [ ]hemoptysis  Cardiovascular: [ ] chest pain, [ ]palpitations, [ ]dizziness,  [ ]leg swelling[ ]orthopnea[ ]PND  Gastrointestinal: [ ] abdominal pain, [ ]nausea, [ ]vomiting,  [ ]diarrhea   Genitourinary: [ ] dysuria, [ ] hematuria  Neurologic: [ ] headaches [ ] tremors[ ]weakness  Skin: [ ] itching, [ ]burning, [ ] rashes  Endocrine: [ ] heat or cold intolerance  Musculoskeletal: [ ] joint pain or swelling; [ ] muscle, back, or extremity pain  Psychiatric: [ ] depression, [ ]anxiety, [ ]mood swings, or [ ]difficulty sleeping  Hematologic: [ ] easy bruising, [ ] bleeding gums    [x] All remaining systems negative except as per above.   [ ]Unable to obtain.    Vital Signs Last 24 Hrs  T(C): 36.3 (24 May 2020 11:40), Max: 37.3 (23 May 2020 22:18)  T(F): 97.3 (24 May 2020 11:40), Max: 99.1 (23 May 2020 22:18)  HR: 55 (24 May 2020 11:40) (55 - 63)  BP: 162/47 (24 May 2020 11:40) (143/49 - 184/55)  BP(mean): --  RR: 17 (24 May 2020 11:40) (17 - 18)  SpO2: 98% (24 May 2020 11:40) (98% - 100%)  I&O's Summary    23 May 2020 07:01  -  24 May 2020 07:00  --------------------------------------------------------  IN: 0 mL / OUT: 500 mL / NET: -500 mL        PHYSICAL EXAM:  General: No acute distress BMI-  HEENT: EOMI, PERRL  Neck: Supple, [ ] JVD  Lungs: Equal air entry bilaterally; [ ] rales [ ] wheezing [ ] rhonchi  Heart: Regular rate and rhythm; [ ] murmur   /6 [ ] systolic [ ] diastolic [ ] radiation[ ] rubs [ ]  gallops  Abdomen: Nontender, bowel sounds present  Extremities: No clubbing, cyanosis, [ ] edema  Nervous system:  Alert & Oriented X3, no focal deficits  Psychiatric: Normal affect  Skin: No rashes or lesions    LABS:  05-24    135  |  102  |  23  ----------------------------<  152<H>  4.2   |  21<L>  |  1.66<H>    Ca    8.5      24 May 2020 06:00  Mg     1.7     05-24      Creatinine Trend: 1.66<--, 1.55<--, 1.73<--, 1.48<--, 1.68<--, 1.82<--                        9.6    9.88  )-----------( 173      ( 24 May 2020 06:00 )             29.3       Lipid Panel:   Cardiac Enzymes:           RADIOLOGY:    ECG [my interpretation]:    TELEMETRY:    ECHO:    STRESS TEST:    CATHETERIZATION:      IMPRESSION AND PLAN:

## 2020-05-25 LAB
ANION GAP SERPL CALC-SCNC: 13 MMO/L — SIGNIFICANT CHANGE UP (ref 7–14)
BUN SERPL-MCNC: 29 MG/DL — HIGH (ref 7–23)
CALCIUM SERPL-MCNC: 8.3 MG/DL — LOW (ref 8.4–10.5)
CHLORIDE SERPL-SCNC: 103 MMOL/L — SIGNIFICANT CHANGE UP (ref 98–107)
CO2 SERPL-SCNC: 20 MMOL/L — LOW (ref 22–31)
CREAT SERPL-MCNC: 1.77 MG/DL — HIGH (ref 0.5–1.3)
GLUCOSE SERPL-MCNC: 144 MG/DL — HIGH (ref 70–99)
HCT VFR BLD CALC: 29.3 % — LOW (ref 39–50)
HGB BLD-MCNC: 9.5 G/DL — LOW (ref 13–17)
MAGNESIUM SERPL-MCNC: 1.6 MG/DL — SIGNIFICANT CHANGE UP (ref 1.6–2.6)
MCHC RBC-ENTMCNC: 30.6 PG — SIGNIFICANT CHANGE UP (ref 27–34)
MCHC RBC-ENTMCNC: 32.4 % — SIGNIFICANT CHANGE UP (ref 32–36)
MCV RBC AUTO: 94.5 FL — SIGNIFICANT CHANGE UP (ref 80–100)
NRBC # FLD: 0 K/UL — SIGNIFICANT CHANGE UP (ref 0–0)
PLATELET # BLD AUTO: 205 K/UL — SIGNIFICANT CHANGE UP (ref 150–400)
PMV BLD: 9.4 FL — SIGNIFICANT CHANGE UP (ref 7–13)
POTASSIUM SERPL-MCNC: 4.1 MMOL/L — SIGNIFICANT CHANGE UP (ref 3.5–5.3)
POTASSIUM SERPL-SCNC: 4.1 MMOL/L — SIGNIFICANT CHANGE UP (ref 3.5–5.3)
RBC # BLD: 3.1 M/UL — LOW (ref 4.2–5.8)
RBC # FLD: 15.5 % — HIGH (ref 10.3–14.5)
SODIUM SERPL-SCNC: 136 MMOL/L — SIGNIFICANT CHANGE UP (ref 135–145)
WBC # BLD: 9.76 K/UL — SIGNIFICANT CHANGE UP (ref 3.8–10.5)
WBC # FLD AUTO: 9.76 K/UL — SIGNIFICANT CHANGE UP (ref 3.8–10.5)

## 2020-05-25 RX ORDER — SODIUM CHLORIDE 9 MG/ML
1000 INJECTION INTRAMUSCULAR; INTRAVENOUS; SUBCUTANEOUS
Refills: 0 | Status: DISCONTINUED | OUTPATIENT
Start: 2020-05-25 | End: 2020-05-27

## 2020-05-25 RX ADMIN — PANTOPRAZOLE SODIUM 40 MILLIGRAM(S): 20 TABLET, DELAYED RELEASE ORAL at 05:51

## 2020-05-25 RX ADMIN — Medication 1 MILLIGRAM(S): at 12:18

## 2020-05-25 RX ADMIN — Medication 112 MICROGRAM(S): at 05:51

## 2020-05-25 RX ADMIN — ISOSORBIDE DINITRATE 10 MILLIGRAM(S): 5 TABLET ORAL at 22:43

## 2020-05-25 RX ADMIN — Medication 81 MILLIGRAM(S): at 12:18

## 2020-05-25 RX ADMIN — SODIUM CHLORIDE 3 MILLILITER(S): 9 INJECTION INTRAMUSCULAR; INTRAVENOUS; SUBCUTANEOUS at 21:49

## 2020-05-25 RX ADMIN — LIDOCAINE 1 PATCH: 4 CREAM TOPICAL at 01:13

## 2020-05-25 RX ADMIN — ISOSORBIDE DINITRATE 10 MILLIGRAM(S): 5 TABLET ORAL at 12:19

## 2020-05-25 RX ADMIN — Medication 25 MILLIGRAM(S): at 05:51

## 2020-05-25 RX ADMIN — Medication 325 MILLIGRAM(S): at 12:18

## 2020-05-25 RX ADMIN — FINASTERIDE 5 MILLIGRAM(S): 5 TABLET, FILM COATED ORAL at 12:18

## 2020-05-25 RX ADMIN — LIDOCAINE 1 PATCH: 4 CREAM TOPICAL at 20:37

## 2020-05-25 RX ADMIN — LIDOCAINE 1 PATCH: 4 CREAM TOPICAL at 12:19

## 2020-05-25 RX ADMIN — Medication 1 DROP(S): at 12:20

## 2020-05-25 RX ADMIN — Medication 25 MILLIGRAM(S): at 17:55

## 2020-05-25 RX ADMIN — SODIUM CHLORIDE 3 MILLILITER(S): 9 INJECTION INTRAMUSCULAR; INTRAVENOUS; SUBCUTANEOUS at 12:19

## 2020-05-25 RX ADMIN — Medication 1 DROP(S): at 05:51

## 2020-05-25 RX ADMIN — ATORVASTATIN CALCIUM 40 MILLIGRAM(S): 80 TABLET, FILM COATED ORAL at 22:43

## 2020-05-25 RX ADMIN — Medication 1000 UNIT(S): at 12:18

## 2020-05-25 RX ADMIN — ISOSORBIDE DINITRATE 10 MILLIGRAM(S): 5 TABLET ORAL at 05:51

## 2020-05-25 RX ADMIN — CEFTRIAXONE 100 MILLIGRAM(S): 500 INJECTION, POWDER, FOR SOLUTION INTRAMUSCULAR; INTRAVENOUS at 05:51

## 2020-05-25 RX ADMIN — SODIUM CHLORIDE 75 MILLILITER(S): 9 INJECTION INTRAMUSCULAR; INTRAVENOUS; SUBCUTANEOUS at 09:55

## 2020-05-25 RX ADMIN — SODIUM CHLORIDE 3 MILLILITER(S): 9 INJECTION INTRAMUSCULAR; INTRAVENOUS; SUBCUTANEOUS at 05:25

## 2020-05-25 NOTE — PHYSICAL THERAPY INITIAL EVALUATION ADULT - DISCHARGE DISPOSITION, PT EVAL
anticipate Home w/ Home PT to address current functional limitations and impairments and to optimize safety within home environment , follow PT notes

## 2020-05-25 NOTE — PROGRESS NOTE ADULT - SUBJECTIVE AND OBJECTIVE BOX
Subjective    Seen & examined at bedside  Urine continues to clear in color  Feeling well.  No pain    Objective    Vital signs  T(F): , Max: 98.7 (05-25-20 @ 05:49)  HR: 57 (05-25-20 @ 05:49)  BP: 151/58 (05-25-20 @ 05:49)  SpO2: 100% (05-25-20 @ 05:49)  Wt(kg): --    Output     05-24 @ 07:01  -  05-25 @ 07:00  --------------------------------------------------------  IN: 1240 mL / OUT: 2250 mL / NET: -1010 mL    Physical Exam  Gen NAD  Abd soft, NT, ND   Infante secured, draining clear yellow urine    Labs  05-24 @ 06:00  WBC 9.88  / Hct 29.3  / SCr 1.66     Urine Cx: Culture - Urine (05.16.20 @ 22:48)    Specimen Source: .Urine Catheterized    Culture Results:   <10,000 CFU/mL Normal Urogenital Ariela

## 2020-05-25 NOTE — PHYSICAL THERAPY INITIAL EVALUATION ADULT - PERTINENT HX OF CURRENT PROBLEM, REHAB EVAL
86 y/o Male with PMHx of CAD s/p stent ([id number]), HTN, Hypothyroid, Neurogenic Bladder and BPH with chronic Infante, DM, CKD and PSHx Green Light PVP 2010, transferred from [facility] with hematuria and anemia for IR embolization.  Also c/o occasional Chest pain with exertion. s/p cardiac cath on 5/22

## 2020-05-25 NOTE — PROGRESS NOTE ADULT - SUBJECTIVE AND OBJECTIVE BOX
S/p prostate embolization 5/21/20  Pt resting in bed without complaints.    Afebrile, VSS      H/H stable    Clear urine in acevedo bag.

## 2020-05-25 NOTE — PHYSICAL THERAPY INITIAL EVALUATION ADULT - GENERAL OBSERVATIONS, REHAB EVAL
Pt seen lying in bed, + monitor, + acevedo. Checked with RN Crista who states okay for PT evaluation however advised for minimal ambulation at this time as was advised to her per team.

## 2020-05-25 NOTE — PROGRESS NOTE ADULT - ASSESSMENT
87M w/ PMHx of CAD s/p stent ([id number]), HTN, Hypothyroid, BPH with chronic Acevedo s/p greenlight PVP 2010, DM, CKD presenting with hematuria. Found to have NSTEMI on admission.  Transferred from  for prostate embolization    Now s/p PAE.  Urine is clearing.  NSTEMI and chest pain post op, s/p Cardiac angiogram.    - s/p IR embolization.  Urine color is clear  - No urologic contraindications to proceeding if angioplasty is needed and DAPT  - Patient in chronic retention, will need to keep acevedo catheter and follow up with Dr. Rangel as an outpatient  - Continue acevedo catheter  - Monitor urine color  - No need for CBI at this time; irrigate prn

## 2020-05-25 NOTE — PROGRESS NOTE ADULT - ASSESSMENT
S/p PAE  Doing well  Urine clear    Continue current care per cardiology, primary team and urology.  Will follow as needed.

## 2020-05-25 NOTE — PHYSICAL THERAPY INITIAL EVALUATION ADULT - GAIT DISTANCE, PT EVAL
4 lateral steps towards head of bed (deferred further ambulation at this time as recommended by RN) 8 lateral steps at the bedside(deferred further ambulation at this time as recommended by RN)

## 2020-05-26 ENCOUNTER — APPOINTMENT (OUTPATIENT)
Dept: CARDIOLOGY | Facility: HOSPITAL | Age: 85
End: 2020-05-26

## 2020-05-26 DIAGNOSIS — I73.9 PERIPHERAL VASCULAR DISEASE, UNSPECIFIED: ICD-10-CM

## 2020-05-26 LAB
ANION GAP SERPL CALC-SCNC: 13 MMO/L — SIGNIFICANT CHANGE UP (ref 7–14)
BUN SERPL-MCNC: 32 MG/DL — HIGH (ref 7–23)
CALCIUM SERPL-MCNC: 8.3 MG/DL — LOW (ref 8.4–10.5)
CHLORIDE SERPL-SCNC: 104 MMOL/L — SIGNIFICANT CHANGE UP (ref 98–107)
CO2 SERPL-SCNC: 20 MMOL/L — LOW (ref 22–31)
CREAT SERPL-MCNC: 1.68 MG/DL — HIGH (ref 0.5–1.3)
GLUCOSE SERPL-MCNC: 119 MG/DL — HIGH (ref 70–99)
HCT VFR BLD CALC: 28.9 % — LOW (ref 39–50)
HGB BLD-MCNC: 9.5 G/DL — LOW (ref 13–17)
MAGNESIUM SERPL-MCNC: 1.8 MG/DL — SIGNIFICANT CHANGE UP (ref 1.6–2.6)
MCHC RBC-ENTMCNC: 31 PG — SIGNIFICANT CHANGE UP (ref 27–34)
MCHC RBC-ENTMCNC: 32.9 % — SIGNIFICANT CHANGE UP (ref 32–36)
MCV RBC AUTO: 94.4 FL — SIGNIFICANT CHANGE UP (ref 80–100)
NRBC # FLD: 0 K/UL — SIGNIFICANT CHANGE UP (ref 0–0)
PLATELET # BLD AUTO: 238 K/UL — SIGNIFICANT CHANGE UP (ref 150–400)
PMV BLD: 9.7 FL — SIGNIFICANT CHANGE UP (ref 7–13)
POTASSIUM SERPL-MCNC: 4.1 MMOL/L — SIGNIFICANT CHANGE UP (ref 3.5–5.3)
POTASSIUM SERPL-SCNC: 4.1 MMOL/L — SIGNIFICANT CHANGE UP (ref 3.5–5.3)
RBC # BLD: 3.06 M/UL — LOW (ref 4.2–5.8)
RBC # FLD: 15.5 % — HIGH (ref 10.3–14.5)
SARS-COV-2 RNA SPEC QL NAA+PROBE: SIGNIFICANT CHANGE UP
SODIUM SERPL-SCNC: 137 MMOL/L — SIGNIFICANT CHANGE UP (ref 135–145)
WBC # BLD: 8.78 K/UL — SIGNIFICANT CHANGE UP (ref 3.8–10.5)
WBC # FLD AUTO: 8.78 K/UL — SIGNIFICANT CHANGE UP (ref 3.8–10.5)

## 2020-05-26 PROCEDURE — 93010 ELECTROCARDIOGRAM REPORT: CPT

## 2020-05-26 RX ORDER — PROTAMINE SULFATE 10 MG/ML
25 AMPUL (ML) INTRAVENOUS ONCE
Refills: 0 | Status: COMPLETED | OUTPATIENT
Start: 2020-05-26 | End: 2020-05-26

## 2020-05-26 RX ORDER — CLOPIDOGREL BISULFATE 75 MG/1
300 TABLET, FILM COATED ORAL ONCE
Refills: 0 | Status: COMPLETED | OUTPATIENT
Start: 2020-05-26 | End: 2020-05-26

## 2020-05-26 RX ORDER — SODIUM CHLORIDE 9 MG/ML
1000 INJECTION INTRAMUSCULAR; INTRAVENOUS; SUBCUTANEOUS
Refills: 0 | Status: DISCONTINUED | OUTPATIENT
Start: 2020-05-26 | End: 2020-05-27

## 2020-05-26 RX ORDER — PROTAMINE SULFATE 10 MG/ML
25 AMPUL (ML) INTRAVENOUS ONCE
Refills: 0 | Status: DISCONTINUED | OUTPATIENT
Start: 2020-05-26 | End: 2020-05-26

## 2020-05-26 RX ORDER — CLOPIDOGREL BISULFATE 75 MG/1
75 TABLET, FILM COATED ORAL DAILY
Refills: 0 | Status: DISCONTINUED | OUTPATIENT
Start: 2020-05-26 | End: 2020-05-27

## 2020-05-26 RX ADMIN — LIDOCAINE 1 PATCH: 4 CREAM TOPICAL at 23:52

## 2020-05-26 RX ADMIN — Medication 1 MILLIGRAM(S): at 12:42

## 2020-05-26 RX ADMIN — ISOSORBIDE DINITRATE 10 MILLIGRAM(S): 5 TABLET ORAL at 23:30

## 2020-05-26 RX ADMIN — CLOPIDOGREL BISULFATE 300 MILLIGRAM(S): 75 TABLET, FILM COATED ORAL at 14:32

## 2020-05-26 RX ADMIN — CEFTRIAXONE 100 MILLIGRAM(S): 500 INJECTION, POWDER, FOR SOLUTION INTRAMUSCULAR; INTRAVENOUS at 05:09

## 2020-05-26 RX ADMIN — Medication 25 MILLIGRAM(S): at 05:09

## 2020-05-26 RX ADMIN — Medication 1000 UNIT(S): at 12:42

## 2020-05-26 RX ADMIN — ISOSORBIDE DINITRATE 10 MILLIGRAM(S): 5 TABLET ORAL at 05:09

## 2020-05-26 RX ADMIN — CLOPIDOGREL BISULFATE 75 MILLIGRAM(S): 75 TABLET, FILM COATED ORAL at 12:42

## 2020-05-26 RX ADMIN — ISOSORBIDE DINITRATE 10 MILLIGRAM(S): 5 TABLET ORAL at 12:44

## 2020-05-26 RX ADMIN — LIDOCAINE 1 PATCH: 4 CREAM TOPICAL at 12:42

## 2020-05-26 RX ADMIN — LIDOCAINE 1 PATCH: 4 CREAM TOPICAL at 00:00

## 2020-05-26 RX ADMIN — Medication 1 DROP(S): at 23:30

## 2020-05-26 RX ADMIN — FINASTERIDE 5 MILLIGRAM(S): 5 TABLET, FILM COATED ORAL at 12:42

## 2020-05-26 RX ADMIN — Medication 81 MILLIGRAM(S): at 12:42

## 2020-05-26 RX ADMIN — Medication 112 MICROGRAM(S): at 05:09

## 2020-05-26 RX ADMIN — SODIUM CHLORIDE 3 MILLILITER(S): 9 INJECTION INTRAMUSCULAR; INTRAVENOUS; SUBCUTANEOUS at 12:44

## 2020-05-26 RX ADMIN — PANTOPRAZOLE SODIUM 40 MILLIGRAM(S): 20 TABLET, DELAYED RELEASE ORAL at 05:09

## 2020-05-26 RX ADMIN — Medication 210 MILLIGRAM(S): at 19:00

## 2020-05-26 RX ADMIN — SODIUM CHLORIDE 3 MILLILITER(S): 9 INJECTION INTRAMUSCULAR; INTRAVENOUS; SUBCUTANEOUS at 05:08

## 2020-05-26 RX ADMIN — Medication 1 DROP(S): at 12:42

## 2020-05-26 RX ADMIN — ATORVASTATIN CALCIUM 40 MILLIGRAM(S): 80 TABLET, FILM COATED ORAL at 23:30

## 2020-05-26 RX ADMIN — Medication 325 MILLIGRAM(S): at 12:42

## 2020-05-26 RX ADMIN — SENNA PLUS 2 TABLET(S): 8.6 TABLET ORAL at 23:30

## 2020-05-26 RX ADMIN — SODIUM CHLORIDE 75 MILLILITER(S): 9 INJECTION INTRAMUSCULAR; INTRAVENOUS; SUBCUTANEOUS at 18:04

## 2020-05-26 RX ADMIN — SODIUM CHLORIDE 3 MILLILITER(S): 9 INJECTION INTRAMUSCULAR; INTRAVENOUS; SUBCUTANEOUS at 23:27

## 2020-05-26 RX ADMIN — LIDOCAINE 1 PATCH: 4 CREAM TOPICAL at 21:30

## 2020-05-26 NOTE — DIETITIAN INITIAL EVALUATION ADULT. - ADD RECOMMEND
1. Monitor weights, labs, BM's, skin integrity, p.o. intake. 2. If PO intake becomes suboptimal add Ensure Enlive 240mls 2x daily (700kcal, 40g protein) and re-consult as needed.

## 2020-05-26 NOTE — DIETITIAN INITIAL EVALUATION ADULT. - PERTINENT LABORATORY DATA
05-26 Na137 mmol/L Glu 119 mg/dL<H> K+ 4.1 mmol/L Cr  1.68 mg/dL<H> BUN 32 mg/dL<H> 05-20 Phos 3.5 mg/dL 05-21 Alb 3.1 g/dL<L> 05-13 Chol 117 mg/dL LDL 56 mg/dL HDL 38 mg/dL<L> Trig 115 mg/dL

## 2020-05-26 NOTE — PROGRESS NOTE ADULT - SUBJECTIVE AND OBJECTIVE BOX
SUBJECTIVE / OVERNIGHT EVENTS: pt denies chest pain, shortness of breath , abd pain , nausea,v       MEDICATIONS  (STANDING):  artificial  tears Solution 1 Drop(s) Both EYES three times a day  aspirin enteric coated 81 milliGRAM(s) Oral daily  atorvastatin 40 milliGRAM(s) Oral at bedtime  cholecalciferol 1000 Unit(s) Oral daily  clopidogrel Tablet 75 milliGRAM(s) Oral daily  ferrous    sulfate 325 milliGRAM(s) Oral daily  finasteride 5 milliGRAM(s) Oral daily  folic acid 1 milliGRAM(s) Oral daily  isosorbide   dinitrate Tablet (ISORDIL) 10 milliGRAM(s) Oral three times a day  levothyroxine 112 MICROGram(s) Oral daily  lidocaine   Patch 1 Patch Transdermal daily  metoprolol tartrate 25 milliGRAM(s) Oral two times a day  pantoprazole    Tablet 40 milliGRAM(s) Oral before breakfast  senna 2 Tablet(s) Oral at bedtime  sodium chloride 0.9% lock flush 3 milliLiter(s) IV Push every 8 hours  sodium chloride 0.9%. 1000 milliLiter(s) (75 mL/Hr) IV Continuous <Continuous>  sodium chloride 0.9%. 1000 milliLiter(s) (75 mL/Hr) IV Continuous <Continuous>    MEDICATIONS  (PRN):  acetaminophen   Tablet .. 650 milliGRAM(s) Oral every 6 hours PRN Mild Pain (1 - 3), Moderate Pain (4 - 6), Severe Pain (7 - 10)  ondansetron Injectable 4 milliGRAM(s) IV Push once PRN Nausea and/or Vomiting  polyethylene glycol 3350 17 Gram(s) Oral daily PRN Constipation      Vital Signs Last 24 Hrs  T(C): 36.6 (26 May 2020 11:48), Max: 36.9 (26 May 2020 05:09)  T(F): 97.9 (26 May 2020 11:48), Max: 98.4 (26 May 2020 05:09)  HR: 58 (26 May 2020 11:48) (58 - 62)  BP: 187/49 (26 May 2020 11:48) (150/73 - 187/49)  BP(mean): --  RR: 17 (26 May 2020 11:48) (17 - 18)  SpO2: 98% (26 May 2020 11:48) (98% - 99%)  CAPILLARY BLOOD GLUCOSE        I&O's Summary    25 May 2020 07:01  -  26 May 2020 07:00  --------------------------------------------------------  IN: 555 mL / OUT: 2100 mL / NET: -1545 mL    26 May 2020 07:01  -  26 May 2020 18:26  --------------------------------------------------------  IN: 450 mL / OUT: 750 mL / NET: -300 mL        Constitutional: No fever, fatigue  Skin: No rash.  Eyes: No recent vision problems or eye pain.  ENT: No congestion, ear pain, or sore throat.  Cardiovascular: No chest pain or palpation.  Respiratory: No cough, shortness of breath, congestion, or wheezing.  Gastrointestinal: No abdominal pain, nausea, vomiting, or diarrhea.  Genitourinary: No dysuria.  Musculoskeletal: No joint swelling.  Neurologic: No headache.    PHYSICAL EXAM:  GENERAL: NAD  EYES: EOMI, PERRLA  NECK: Supple, No JVD  CHEST/LUNG: dec breath sounds at bases   HEART:  S1 , S2 +  ABDOMEN: soft , bs+  EXTREMITIES:  edema+  NEUROLOGY:alert awake follows commands       LABS:                        9.5    8.78  )-----------( 238      ( 26 May 2020 05:00 )             28.9     05-26    137  |  104  |  32<H>  ----------------------------<  119<H>  4.1   |  20<L>  |  1.68<H>    Ca    8.3<L>      26 May 2020 05:00  Mg     1.8     05-26                RADIOLOGY & ADDITIONAL TESTS:    Imaging Personally Reviewed:    Consultant(s) Notes Reviewed:      Care Discussed with Consultants/Other Providers:

## 2020-05-26 NOTE — PROGRESS NOTE ADULT - REASON FOR ADMISSION
Hematuria
c/s for PAE
Hematuria

## 2020-05-26 NOTE — PROGRESS NOTE ADULT - SUBJECTIVE AND OBJECTIVE BOX
DATE OF SERVICE:Patient was seen and examined :05/26/2020    PRESENTING CC:Hematuria    SUBJ: 86 yo Male from Home w/ PMHx of CAD s/p stent (6249-6223), HTN, Hypothyroid, Neurogenic Bladder and BPH with chronic Infante, T2DM, CKD and PSHx Green Light PVP 2010, presented to [facility] w/ hematuria on CBI transferred to [facility] had Prostate Artery Embolization underwent  cardiac cath -severe Subclavian artery stenosis scheduled for PCI,hematuria cleared no chest pain or dyspnea      [facility] -reviewed admission note, no change since admission  Heart failure: acute [ ] chronic [ ] acute or chronic [ ] diastolic [ ] systolic [ ] combined systolic and diastolic[ ]  TERI: ATN[ ] renal medullary necrosis [ ] CKD I [ ]CKDII [ ]CKD III [ ]CKD IV [ ]CKD V [ ]Other pathological lesions [ ]    MEDICATIONS  (STANDING):  artificial  tears Solution 1 Drop(s) Both EYES three times a day  aspirin enteric coated 81 milliGRAM(s) Oral daily  atorvastatin 40 milliGRAM(s) Oral at bedtime  cholecalciferol 1000 Unit(s) Oral daily  ferrous    sulfate 325 milliGRAM(s) Oral daily  finasteride 5 milliGRAM(s) Oral daily  folic acid 1 milliGRAM(s) Oral daily  isosorbide   dinitrate Tablet (ISORDIL) 10 milliGRAM(s) Oral three times a day  levothyroxine 112 MICROGram(s) Oral daily  lidocaine   Patch 1 Patch Transdermal daily  metoprolol tartrate 25 milliGRAM(s) Oral two times a day  pantoprazole    Tablet 40 milliGRAM(s) Oral before breakfast  senna 2 Tablet(s) Oral at bedtime  sodium chloride 0.9% lock flush 3 milliLiter(s) IV Push every 8 hours  sodium chloride 0.9%. 1000 milliLiter(s) (75 mL/Hr) IV Continuous <Continuous>    MEDICATIONS  (PRN):  acetaminophen   Tablet .. 650 milliGRAM(s) Oral every 6 hours PRN Mild Pain (1 - 3), Moderate Pain (4 - 6), Severe Pain (7 - 10)  ondansetron Injectable 4 milliGRAM(s) IV Push once PRN Nausea and/or Vomiting  polyethylene glycol 3350 17 Gram(s) Oral daily PRN Constipation              REVIEW OF SYSTEMS:  Constitutional: [ ] fever, [ ]weight loss,  [x ]fatigue  Eyes: [ ] visual changes  Respiratory: [ ]shortness of breath;  [ ] cough, [ ]wheezing, [ ]chills, [ ]hemoptysis  Cardiovascular: [ ] chest pain, [ ]palpitations, [ ]dizziness,  [ ]leg swelling[ ]orthopnea[ ]PND  Gastrointestinal: [ ] abdominal pain, [ ]nausea, [ ]vomiting,  [ ]diarrhea   Genitourinary: [ ] dysuria, [ ] hematuria  Neurologic: [ ] headaches [ ] tremors[ ]weakness  Skin: [ ] itching, [ ]burning, [ ] rashes  Endocrine: [ ] heat or cold intolerance  Musculoskeletal: [ ] joint pain or swelling; [ ] muscle, back, or extremity pain  Psychiatric: [ ] depression, [ ]anxiety, [ ]mood swings, or [ ]difficulty sleeping  Hematologic: [ ] easy bruising, [ ] bleeding gums    [x] All remaining systems negative except as per above.   [ ]Unable to obtain.    Vital Signs Last 24 Hrs  T(C): 36.9 (26 May 2020 05:09), Max: 36.9 (26 May 2020 05:09)  T(F): 98.4 (26 May 2020 05:09), Max: 98.4 (26 May 2020 05:09)  HR: 62 (26 May 2020 05:09) (57 - 62)  BP: 150/73 (26 May 2020 05:09) (145/56 - 160/59)  RR: 18 (26 May 2020 05:09) (16 - 18)  SpO2: 99% (26 May 2020 05:09) (97% - 99%)  I&O's Summary    25 May 2020 07:01  -  26 May 2020 07:00  --------------------------------------------------------  IN: 555 mL / OUT: 2100 mL / NET: -1545 mL    26 May 2020 07:01  -  26 May 2020 09:02  --------------------------------------------------------  IN: 75 mL / OUT: 0 mL / NET: 75 mL        PHYSICAL EXAM:  General: No acute distress BMI-26.6  HEENT: EOMI, PERRL  Neck: Supple, [ ] JVD  Lungs: Equal air entry bilaterally; [ ] rales [ ] wheezing [ ] rhonchi  Heart: Regular rate and rhythm; [x ] murmur  2 /6 [x ] systolic [ ] diastolic [ ] radiation[ ] rubs [ ]  gallops  Abdomen: Nontender, bowel sounds present  Extremities: No clubbing, cyanosis, [ ] edema  Nervous system:  Alert & Oriented X3, no focal deficits  Psychiatric: Normal affect  Skin: No rashes or lesions    LABS:  05-26    137  |  104  |  32<H>  ----------------------------<  119<H>  4.1   |  20<L>  |  1.68<H>    Ca    8.3<L>      26 May 2020 05:00  Mg     1.8     05-26      Creatinine Trend: 1.68<--, 1.77<--, 1.66<--, 1.55<--, 1.73<--, 1.48<--                        9.5    8.78  )-----------( 238      ( 26 May 2020 05:00 )             28.9               IMPRESSION AND PLAN:    Problem/Plan - 1:  ·  Problem: Hematuria.  Plan: Patient has chronic  hematuria,  -s/p 4 unit pRBC, hgb now stable- 8.6 ---> 8.3  - Infante with red tinged urine,  -s/p   Prostate Atery Embolization  -Hematuria resolved      Problem/Plan - 2:  ·  Problem: Chest pain-NSTEMI.  Plan: Original plans for transfer for cardiac cath, however due to persistent acute blood loss anemia.  Hx CAD  Continue nitrates Aspirin statins -remains chest pain free  Cath-subclavian stenosis for PCI today  -Add Clopidogrel      Problem/Plan - 3:  ·  Problem: CAD (Coronary Artery Disease) Type 2 MI-Demand.  Plan: Continue with Asprin, plavix dced  Due to persistent hematuria.   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

## 2020-05-26 NOTE — CHART NOTE - NSCHARTNOTEFT_GEN_A_CORE
The patient is s/p L subclavian artery PCI, via b/l radial arteries and RFA. The patient was noted to have perfuse oozing at the access site while the femoral sheath was in.   Dr. Cerrato was made aware, recommended Protamine 25 mg IVPB x 1.   ACT post Protamine 135.   RFA sheath was pulled, manual pressure applied fro 25 min. Hemostasis achieved, no hematoma.   The patient was also noted to have rounded palpable 1x1 cm movable mass above the site, non tender. As per interventional cardiology fellow, the patient had the mass prior to cath, most likely caused by previous procedure.   The patient is stable, tele PA was made aware to continue observation.

## 2020-05-26 NOTE — DIETITIAN INITIAL EVALUATION ADULT. - PERTINENT MEDS FT
MEDICATIONS  (STANDING):  artificial  tears Solution 1 Drop(s) Both EYES three times a day  aspirin enteric coated 81 milliGRAM(s) Oral daily  atorvastatin 40 milliGRAM(s) Oral at bedtime  cholecalciferol 1000 Unit(s) Oral daily  clopidogrel Tablet 75 milliGRAM(s) Oral daily  ferrous    sulfate 325 milliGRAM(s) Oral daily  finasteride 5 milliGRAM(s) Oral daily  folic acid 1 milliGRAM(s) Oral daily  isosorbide   dinitrate Tablet (ISORDIL) 10 milliGRAM(s) Oral three times a day  levothyroxine 112 MICROGram(s) Oral daily  lidocaine   Patch 1 Patch Transdermal daily  metoprolol tartrate 25 milliGRAM(s) Oral two times a day  pantoprazole    Tablet 40 milliGRAM(s) Oral before breakfast  senna 2 Tablet(s) Oral at bedtime  sodium chloride 0.9% lock flush 3 milliLiter(s) IV Push every 8 hours  sodium chloride 0.9%. 1000 milliLiter(s) (75 mL/Hr) IV Continuous <Continuous>    MEDICATIONS  (PRN):  acetaminophen   Tablet .. 650 milliGRAM(s) Oral every 6 hours PRN Mild Pain (1 - 3), Moderate Pain (4 - 6), Severe Pain (7 - 10)  ondansetron Injectable 4 milliGRAM(s) IV Push once PRN Nausea and/or Vomiting  polyethylene glycol 3350 17 Gram(s) Oral daily PRN Constipation

## 2020-05-26 NOTE — DIETITIAN INITIAL EVALUATION ADULT. - OTHER INFO
88 yo Male from Home w/ PMHx of CAD s/p stent (8210-2525), HTN, Hypothyroid, Neurogenic Bladder and BPH with chronic Infante, T2DM, CKD and PSHx Green Light PVP 2010, presented to [facility] w/ hematuria on CBI transferred to [facility] had Prostate Artery Embolization underwent cardiac cath. Patient currently NPO pending Subclavian artery stenosis scheduled for today. Unable to conduct a face to face interview or nutrition-focused physical exam due to mitigation efforts to reduce contacts during COVID19 pandemic. Attempted to call up to patients room- no response. Collateral obtained from chart review. Unable to assess PO intake. Patient noted to consume 100% of lunch yesterday per RN documentation in flowsheets. + Constipation on bowel regimen,  no nausea/vomiting/diarrhea per chart. No chewing or swallowing difficulties at this time per chart. Patient with UBW of 175lbs. Admit weight (5/19) 75kg. (5/23) 80kg.

## 2020-05-27 ENCOUNTER — TRANSCRIPTION ENCOUNTER (OUTPATIENT)
Age: 85
End: 2020-05-27

## 2020-05-27 VITALS
SYSTOLIC BLOOD PRESSURE: 155 MMHG | DIASTOLIC BLOOD PRESSURE: 58 MMHG | HEART RATE: 71 BPM | OXYGEN SATURATION: 97 % | RESPIRATION RATE: 18 BRPM | TEMPERATURE: 99 F

## 2020-05-27 LAB
ANION GAP SERPL CALC-SCNC: 12 MMO/L — SIGNIFICANT CHANGE UP (ref 7–14)
BUN SERPL-MCNC: 30 MG/DL — HIGH (ref 7–23)
CALCIUM SERPL-MCNC: 8.4 MG/DL — SIGNIFICANT CHANGE UP (ref 8.4–10.5)
CHLORIDE SERPL-SCNC: 108 MMOL/L — HIGH (ref 98–107)
CO2 SERPL-SCNC: 18 MMOL/L — LOW (ref 22–31)
CREAT SERPL-MCNC: 1.87 MG/DL — HIGH (ref 0.5–1.3)
GLUCOSE SERPL-MCNC: 200 MG/DL — HIGH (ref 70–99)
HCT VFR BLD CALC: 27 % — LOW (ref 39–50)
HGB BLD-MCNC: 8.9 G/DL — LOW (ref 13–17)
MAGNESIUM SERPL-MCNC: 1.7 MG/DL — SIGNIFICANT CHANGE UP (ref 1.6–2.6)
MCHC RBC-ENTMCNC: 31 PG — SIGNIFICANT CHANGE UP (ref 27–34)
MCHC RBC-ENTMCNC: 33 % — SIGNIFICANT CHANGE UP (ref 32–36)
MCV RBC AUTO: 94.1 FL — SIGNIFICANT CHANGE UP (ref 80–100)
NRBC # FLD: 0 K/UL — SIGNIFICANT CHANGE UP (ref 0–0)
PLATELET # BLD AUTO: 224 K/UL — SIGNIFICANT CHANGE UP (ref 150–400)
PMV BLD: 9.3 FL — SIGNIFICANT CHANGE UP (ref 7–13)
POTASSIUM SERPL-MCNC: 3.9 MMOL/L — SIGNIFICANT CHANGE UP (ref 3.5–5.3)
POTASSIUM SERPL-SCNC: 3.9 MMOL/L — SIGNIFICANT CHANGE UP (ref 3.5–5.3)
RBC # BLD: 2.87 M/UL — LOW (ref 4.2–5.8)
RBC # FLD: 15.7 % — HIGH (ref 10.3–14.5)
SODIUM SERPL-SCNC: 138 MMOL/L — SIGNIFICANT CHANGE UP (ref 135–145)
WBC # BLD: 7.54 K/UL — SIGNIFICANT CHANGE UP (ref 3.8–10.5)
WBC # FLD AUTO: 7.54 K/UL — SIGNIFICANT CHANGE UP (ref 3.8–10.5)

## 2020-05-27 RX ORDER — METOPROLOL TARTRATE 50 MG
1 TABLET ORAL
Qty: 60 | Refills: 0
Start: 2020-05-27 | End: 2020-06-25

## 2020-05-27 RX ORDER — FINASTERIDE 5 MG/1
1 TABLET, FILM COATED ORAL
Qty: 30 | Refills: 0
Start: 2020-05-27 | End: 2020-06-25

## 2020-05-27 RX ORDER — CLOPIDOGREL BISULFATE 75 MG/1
1 TABLET, FILM COATED ORAL
Qty: 30 | Refills: 0
Start: 2020-05-27 | End: 2020-06-25

## 2020-05-27 RX ORDER — ATENOLOL 25 MG/1
1.5 TABLET ORAL
Qty: 0 | Refills: 0 | DISCHARGE

## 2020-05-27 RX ORDER — LIDOCAINE 4 G/100G
1 CREAM TOPICAL
Qty: 30 | Refills: 0
Start: 2020-05-27 | End: 2020-06-25

## 2020-05-27 RX ADMIN — FINASTERIDE 5 MILLIGRAM(S): 5 TABLET, FILM COATED ORAL at 13:03

## 2020-05-27 RX ADMIN — Medication 1 MILLIGRAM(S): at 13:03

## 2020-05-27 RX ADMIN — SODIUM CHLORIDE 3 MILLILITER(S): 9 INJECTION INTRAMUSCULAR; INTRAVENOUS; SUBCUTANEOUS at 13:04

## 2020-05-27 RX ADMIN — Medication 1000 UNIT(S): at 13:03

## 2020-05-27 RX ADMIN — Medication 1 DROP(S): at 13:03

## 2020-05-27 RX ADMIN — CLOPIDOGREL BISULFATE 75 MILLIGRAM(S): 75 TABLET, FILM COATED ORAL at 13:03

## 2020-05-27 RX ADMIN — Medication 25 MILLIGRAM(S): at 06:22

## 2020-05-27 RX ADMIN — ISOSORBIDE DINITRATE 10 MILLIGRAM(S): 5 TABLET ORAL at 06:22

## 2020-05-27 RX ADMIN — Medication 112 MICROGRAM(S): at 06:22

## 2020-05-27 RX ADMIN — ISOSORBIDE DINITRATE 10 MILLIGRAM(S): 5 TABLET ORAL at 13:03

## 2020-05-27 RX ADMIN — Medication 325 MILLIGRAM(S): at 13:03

## 2020-05-27 RX ADMIN — LIDOCAINE 1 PATCH: 4 CREAM TOPICAL at 13:03

## 2020-05-27 RX ADMIN — SODIUM CHLORIDE 75 MILLILITER(S): 9 INJECTION INTRAMUSCULAR; INTRAVENOUS; SUBCUTANEOUS at 06:21

## 2020-05-27 RX ADMIN — SODIUM CHLORIDE 3 MILLILITER(S): 9 INJECTION INTRAMUSCULAR; INTRAVENOUS; SUBCUTANEOUS at 06:23

## 2020-05-27 RX ADMIN — Medication 81 MILLIGRAM(S): at 13:03

## 2020-05-27 RX ADMIN — PANTOPRAZOLE SODIUM 40 MILLIGRAM(S): 20 TABLET, DELAYED RELEASE ORAL at 06:22

## 2020-05-27 RX ADMIN — Medication 1 DROP(S): at 06:23

## 2020-05-27 NOTE — DISCHARGE NOTE PROVIDER - NSDCMRMEDTOKEN_GEN_ALL_CORE_FT
acetaminophen 325 mg oral tablet: 2 tab(s) orally every 6 hours, As needed, Temp greater or equal to 38C (100.4F), Mild Pain (1 - 3)  aspirin 81 mg oral delayed release tablet: 1 tab(s) orally once a day  atorvastatin 40 mg oral tablet: 1 tab(s) orally once a day  cholecalciferol 1000 intl units (25 mcg) oral tablet: 2 tab(s) orally once a day  clopidogrel 75 mg oral tablet: 1 tab(s) orally once a day  ferrous sulfate 325 mg (65 mg elemental iron) oral tablet: 1 tab(s) orally once a day  finasteride 5 mg oral tablet: 1 tab(s) orally once a day  folic acid 1 mg oral tablet: 1 tab(s) orally once a day  isosorbide dinitrate 10 mg oral tablet: 1 tab(s) orally 3 times a day  levothyroxine 112 mcg (0.112 mg) oral capsule: 1 cap(s) orally once a day  lidocaine 5% topical film: 1 patch transdermal once a day   metoprolol tartrate 25 mg oral tablet: 1 tab(s) orally 2 times a day  ocular lubricant ophthalmic solution: 1 drop(s) to each affected eye 3 times a day  ondansetron 2 mg/mL injectable solution: 4 milligram(s) intravenous every 8 hours, As Needed  pantoprazole 40 mg oral delayed release tablet: 1 tab(s) orally once a day (before a meal)  polyethylene glycol 3350 oral powder for reconstitution: 17 gram(s) orally once a day, As needed, Constipation  senna oral tablet: 2 tab(s) orally once a day (at bedtime)

## 2020-05-27 NOTE — DISCHARGE NOTE PROVIDER - NSDCCPCAREPLAN_GEN_ALL_CORE_FT
PRINCIPAL DISCHARGE DIAGNOSIS  Diagnosis: Stenosis of left subclavian artery  Assessment and Plan of Treatment: s/p stent of severe  left subclavian artery stenosis. Please follow up with your primary care physician regarding your hospitalization and take all medications prescribed.      SECONDARY DISCHARGE DIAGNOSES  Diagnosis: Hematuria  Assessment and Plan of Treatment: Hematuria - Please follow up with your primary care physician regarding your hospitalization and take all medications prescribed.

## 2020-05-27 NOTE — DISCHARGE NOTE PROVIDER - CARE PROVIDER_API CALL
Salvador Weinberg  CARDIOLOGY  [address] [address] [place] [address]  Phone: [phone]  Fax: [phone]  Established Patient  Follow Up Time: 1 week    KARLENE JIMENEZ  Urology  [address] [place] [address]  Phone: [phone]  Fax: [phone]  Established Patient  Follow Up Time: 2 weeks

## 2020-05-27 NOTE — CHART NOTE - NSCHARTNOTEFT_GEN_A_CORE
Patient s/p Peripheral angio w PCI for subclavian artery stenosis - multiple access sites for PCI   Patient was sitting in bed eating his dinner, had no complaints.   Left & Right radial access sites - stable - dressings intact, clean, dry, no hematoma or bleed, radial pulses 2+, good capillary refill < 2 secs, pt able to flex, extend and squeeze his right hand digits without pain or difficulty.   Right femoral artery access site  dressing intact, clean / dry. Dressing removed site stable - no hematoma, no bleed, distal pulses intact, Lower extremity warm to touch.     Patient stable will continue to monitor    Vital Signs Last 24 Hrs  T(C): 36.6 (26 May 2020 11:48), Max: 36.9 (26 May 2020 05:09)  T(F): 97.9 (26 May 2020 11:48), Max: 98.4 (26 May 2020 05:09)  HR: 69 (26 May 2020 23:25) (58 - 69)  BP: 169/60 (26 May 2020 23:25) (150/73 - 187/49)  RR: 17 (26 May 2020 21:20) (17 - 18)  SpO2: 99% (26 May 2020 21:20) (98% - 99%) Patient s/p Peripheral angio w PCI for subclavian artery stenosis - multiple access sites for PCI     Patient was sitting in bed eating his dinner, had no complaints.     Left & Right radial access sites - stable - dressings intact, clean, dry, no hematoma or bleed, radial pulses 2+, good capillary refill < 2 secs, pt able to flex, extend and squeeze his right hand digits without pain or difficulty.     Right femoral artery access site stable, dressing intact, clean / dry.  - no hematoma, no bleed, distal pulses intact, Lower extremity warm to touch.   Superior to the intervention site was a small nodular lesion non tender - noted by Cath lab staff prior to cath intervention - per Jack Cath lab PA most likely from recent previous procedure with Mynx closure device. - will continue to monitor     Patient stable will continue to monitor    Vital Signs Last 24 Hrs  T(C): 36.6 (26 May 2020 11:48), Max: 36.9 (26 May 2020 05:09)  T(F): 97.9 (26 May 2020 11:48), Max: 98.4 (26 May 2020 05:09)  HR: 69 (26 May 2020 23:25) (58 - 69)  BP: 169/60 (26 May 2020 23:25) (150/73 - 187/49)  RR: 17 (26 May 2020 21:20) (17 - 18)  SpO2: 99% (26 May 2020 21:20) (98% - 99%)

## 2020-05-27 NOTE — DISCHARGE NOTE NURSING/CASE MANAGEMENT/SOCIAL WORK - PATIENT PORTAL LINK FT
You can access the FollowMyHealth Patient Portal offered by [facility] by registering at the following website: [email or web address]. By joining [employer]’s FollowMyHealth portal, you will also be able to view your health information using other applications (apps) compatible with our system.

## 2020-05-27 NOTE — PROGRESS NOTE ADULT - SUBJECTIVE AND OBJECTIVE BOX
SUBJECTIVE / OVERNIGHT EVENTS: pt denies chest pain, shortness of breath , abd pain , nausea,v     MEDICATIONS  (STANDING):  artificial  tears Solution 1 Drop(s) Both EYES three times a day  aspirin enteric coated 81 milliGRAM(s) Oral daily  atorvastatin 40 milliGRAM(s) Oral at bedtime  cholecalciferol 1000 Unit(s) Oral daily  clopidogrel Tablet 75 milliGRAM(s) Oral daily  ferrous    sulfate 325 milliGRAM(s) Oral daily  finasteride 5 milliGRAM(s) Oral daily  folic acid 1 milliGRAM(s) Oral daily  isosorbide   dinitrate Tablet (ISORDIL) 10 milliGRAM(s) Oral three times a day  levothyroxine 112 MICROGram(s) Oral daily  lidocaine   Patch 1 Patch Transdermal daily  metoprolol tartrate 25 milliGRAM(s) Oral two times a day  pantoprazole    Tablet 40 milliGRAM(s) Oral before breakfast  senna 2 Tablet(s) Oral at bedtime  sodium chloride 0.9% lock flush 3 milliLiter(s) IV Push every 8 hours  sodium chloride 0.9%. 1000 milliLiter(s) (75 mL/Hr) IV Continuous <Continuous>  sodium chloride 0.9%. 1000 milliLiter(s) (75 mL/Hr) IV Continuous <Continuous>    MEDICATIONS  (PRN):  acetaminophen   Tablet .. 650 milliGRAM(s) Oral every 6 hours PRN Mild Pain (1 - 3), Moderate Pain (4 - 6), Severe Pain (7 - 10)  ondansetron Injectable 4 milliGRAM(s) IV Push once PRN Nausea and/or Vomiting  polyethylene glycol 3350 17 Gram(s) Oral daily PRN Constipation    Vital Signs Last 24 Hrs  T(C): 37 (27 May 2020 12:27), Max: 37 (27 May 2020 12:27)  T(F): 98.6 (27 May 2020 12:27), Max: 98.6 (27 May 2020 12:27)  HR: 71 (27 May 2020 12:27) (69 - 71)  BP: 155/58 (27 May 2020 12:27) (142/49 - 155/58)  BP(mean): --  RR: 18 (27 May 2020 12:27) (17 - 18)  SpO2: 97% (27 May 2020 12:27) (97% - 97%)    Eyes: No recent vision problems or eye pain.  ENT: No congestion, ear pain, or sore throat.  Cardiovascular: No chest pain or palpation.  Respiratory: No cough, shortness of breath, congestion, or wheezing.  Gastrointestinal: No abdominal pain, nausea, vomiting, or diarrhea.  Genitourinary: No dysuria.  Musculoskeletal: No joint swelling.  Neurologic: No headache.    PHYSICAL EXAM:  GENERAL: NAD  EYES: EOMI, PERRLA  NECK: Supple, No JVD  CHEST/LUNG: dec breath sounds at bases   HEART:  S1 , S2 +  ABDOMEN: soft , bs+  EXTREMITIES:  edema+  NEUROLOGY:alert awake follows commands   LABS:  05-27    138  |  108<H>  |  30<H>  ----------------------------<  200<H>  3.9   |  18<L>  |  1.87<H>    Ca    8.4      27 May 2020 06:00  Mg     1.7     05-27      Creatinine Trend: 1.87 <--, 1.68 <--, 1.77 <--, 1.66 <--, 1.55 <--, 1.73 <--, 1.48 <--, 1.68 <--                        8.9    7.54  )-----------( 224      ( 27 May 2020 06:00 )             27.0     Urine Studies:                            RADIOLOGY & ADDITIONAL TESTS:    Imaging Personally Reviewed:    Consultant(s) Notes Reviewed:      Care Discussed with Consultants/Other Providers:

## 2020-05-27 NOTE — PROGRESS NOTE ADULT - PROBLEM SELECTOR PLAN 1
s/p IR embolization.  Urine color is clear  acevedo   gu f/u  monitor renal fx closely- out pt renal f/u and out pt cr f/u   clear for dc by gu/ cards

## 2020-05-27 NOTE — DISCHARGE NOTE PROVIDER - NSDCHHNEEDSERVICE_GEN_ALL_CORE
Observation and assessment/Rehabilitation services Other, specify.../Observation and assessment/Rehabilitation services

## 2020-05-27 NOTE — DISCHARGE NOTE PROVIDER - HOSPITAL COURSE
86 yo Male from Home w/ PMHx of CAD s/p stent ([id number]), HTN, Hypothyroid, Neurogenic Bladder and BPH with chronic Infante, T2DM, CKD and PSHx Green Light PVP 2010, presented to [facility] w/ hematuria on CBI transferred to [facility] had Prostate Artery Embolization underwent  cardiac cath -severe Subclavian artery stenosis scheduled for PCI, hematuria cleared no chest pain or dyspnea    s/p Peripheral angio w PCI for subclavian artery stenosis - multiple access sites for PCI     Hematuria.  Plan: Patient has chronic  hematuria,    -s/p 4 unit pRBC, hgb now stable- 8.6 ---> 8.3    - Infante with red tinged urine,    -s/p   Prostate Atery Embolization    -Hematuria resolved    Chest pain-NSTEMI.  Plan: Original plans for transfer for cardiac cath, however due to persistent acute blood loss anemia.    Hx CAD    Continue nitrates Aspirin statins -remains chest pain free    Cath-subclavian stenosis for PCI today    -Add Clopidogrel    CAD (Coronary Artery Disease) Type 2 MI-Demand.  Plan: Continue with Asprin, plavix dced    Due to persistent hematuria.     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

## 2020-05-27 NOTE — DISCHARGE NOTE PROVIDER - PROVIDER TOKENS
PROVIDER:[TOKEN:[[id number]],FOLLOWUP:[1 week],ESTABLISHEDPATIENT:[T]],PROVIDER:[TOKEN:[[id number]],FOLLOWUP:[2 weeks],ESTABLISHEDPATIENT:[T]]

## 2020-05-27 NOTE — DISCHARGE NOTE PROVIDER - NSDCFUADDINST_GEN_ALL_CORE_FT
No heavy lifting x one week. No strenuous activity x 3 weeks. Monitor site of procedure and notify your doctor for any redness, swelling, discharge. No driving x 24 hours. You may shower but no baths or swimming x one week. No heavy lifting x one week. No strenuous activity x 3 weeks. Monitor site of procedure and notify your doctor for any redness, swelling, discharge. No driving x 24 hours. You may shower but no baths or swimming x one week.    Followup with Urologist Dr Rangel for acevedo catheter care.

## 2020-05-27 NOTE — PROGRESS NOTE ADULT - ATTENDING COMMENTS
Patient was seen and examined by me o 05/20/2020 on,interim events noted,labs and radiology studies reviewed.  Salvador Weinberg MD,FACC.  [address] [place] [address].  [id number]. [phone]
Patient was seen and examined by me on 05/21/2020,interim events noted,labs and radiology studies reviewed.  Salvador Weinberg MD,FACC.  [address] [place] [address].  [facility].  [phone]
Patient was seen and examined by me on 05/22/2020,interim events noted,labs and radiology studies reviewed.  Salvador Weinberg MD,FACC.  [address] [place] [address].  [id number].  [phone]
Patient was seen and examined by me on 05/26/2020,interim events noted,labs and radiology studies reviewed.  Salvador Weinberg MD,FACC.  [address] [place] [address].  [facility].  [phone]
patient was seen in bed, urine color clear yellow with pink-tinge in Infante bag. Agree with plan as above.
patient was seen in cardiac cath recovery room, above plan noted. Infante with light pink urine. Keep catheter to drainage for now. No clots noted. Will follow.
patient was seen in IR suite during prostate artery embolization. Above plan noted. Will follow after embolization to assess for hematuria resolution.
patient seen and examined, agree with plan as above. Infante with light pink urine. Keep Infante to drainage as patient has been in retention for years with chronic Infante.
start sq heparin or dvt prophylaxis id contraindication from  / cards
start sq heparin or dvt prophylaxis id contraindication from  / cards

## 2020-05-27 NOTE — PROGRESS NOTE ADULT - SUBJECTIVE AND OBJECTIVE BOX
Subjective  Now s/p L subclavian artery PCI. Some bleeding at access site overnight, now resolved. Pt otherwise doing well. Denies suprapubic pain or bladder spasms.    Objective    Vital signs  T(F): , Max: 98.2 (05-27-20 @ 06:20)  HR: 69 (05-27-20 @ 06:20)  BP: 142/49 (05-27-20 @ 06:20)  SpO2: 97% (05-27-20 @ 06:20)  Wt(kg): --    Output     05-26 @ 07:01  -  05-27 @ 07:00  --------------------------------------------------------  IN: 1600 mL / OUT: 1450 mL / NET: 150 mL    05-27 @ 07:01  -  05-27 @ 10:51  --------------------------------------------------------  IN: 360 mL / OUT: 0 mL / NET: 360 mL      Gen: NAD  Abd: soft, NT, ND  : acevedo output clear yellow urine    Labs      05-27 @ 06:00    WBC 7.54  / Hct 27.0  / SCr 1.87     05-26 @ 05:00    WBC 8.78  / Hct 28.9  / SCr 1.68     Imaging

## 2020-05-27 NOTE — PROGRESS NOTE ADULT - ASSESSMENT
87M w/ PMHx of CAD s/p stent ([id number]), HTN, Hypothyroid, BPH with chronic Acevedo s/p greenlight PVP 2010, DM, CKD presenting with hematuria. Found to have NSTEMI on admission.  Transferred from  for prostate embolization    Now s/p PAE.  Urine is clearing.  NSTEMI and chest pain post op, s/p Cardiac angiogram and Peripheral angio w PCI for subclavian artery stenosis    - Urine color remains clear  - No urologic contraindications to DAPT  - Patient in chronic retention, continue acevedo catheter  - Monitor urine color  - No need for CBI at this time; irrigate prn  - pt to f/u w/Dr. Rangel as outpt   - please call if questions

## 2020-11-27 ENCOUNTER — EMERGENCY (EMERGENCY)
Facility: HOSPITAL | Age: 85
LOS: 1 days | Discharge: HOME | End: 2020-11-27
Attending: EMERGENCY MEDICINE
Payer: MEDICARE

## 2020-11-27 VITALS
DIASTOLIC BLOOD PRESSURE: 67 MMHG | SYSTOLIC BLOOD PRESSURE: 156 MMHG | TEMPERATURE: 99 F | HEART RATE: 60 BPM | RESPIRATION RATE: 18 BRPM | OXYGEN SATURATION: 99 %

## 2020-11-27 VITALS
RESPIRATION RATE: 16 BRPM | TEMPERATURE: 98 F | HEART RATE: 61 BPM | SYSTOLIC BLOOD PRESSURE: 153 MMHG | DIASTOLIC BLOOD PRESSURE: 60 MMHG | HEIGHT: 67 IN | OXYGEN SATURATION: 100 %

## 2020-11-27 LAB
APPEARANCE UR: CLEAR — SIGNIFICANT CHANGE UP
BILIRUB UR-MCNC: NEGATIVE — SIGNIFICANT CHANGE UP
COLOR SPEC: YELLOW — SIGNIFICANT CHANGE UP
DIFF PNL FLD: ABNORMAL
GLUCOSE UR QL: NEGATIVE — SIGNIFICANT CHANGE UP
KETONES UR-MCNC: NEGATIVE — SIGNIFICANT CHANGE UP
LEUKOCYTE ESTERASE UR-ACNC: ABNORMAL
NITRITE UR-MCNC: NEGATIVE — SIGNIFICANT CHANGE UP
PH UR: 6 — SIGNIFICANT CHANGE UP (ref 5–8)
PROT UR-MCNC: 100
SP GR SPEC: 1.01 — SIGNIFICANT CHANGE UP (ref 1.01–1.02)
UROBILINOGEN FLD QL: NEGATIVE — SIGNIFICANT CHANGE UP

## 2020-11-27 PROCEDURE — 81001 URINALYSIS AUTO W/SCOPE: CPT

## 2020-11-27 PROCEDURE — 99283 EMERGENCY DEPT VISIT LOW MDM: CPT

## 2020-11-27 PROCEDURE — 87186 SC STD MICRODIL/AGAR DIL: CPT

## 2020-11-27 PROCEDURE — 87086 URINE CULTURE/COLONY COUNT: CPT

## 2020-11-27 NOTE — ED PROVIDER NOTE - PATIENT PORTAL LINK FT
You can access the FollowMyHealth Patient Portal offered by [facility] by registering at the following website: [email or web address]. By joining [employer]’s FollowMyHealth portal, you will also be able to view your health information using other applications (apps) compatible with our system.

## 2020-11-27 NOTE — ED ADULT NURSE NOTE - OBJECTIVE STATEMENT
The patient presents with complaint that his acevedo drain bag has not collected any urine output this morning.  He is concerned that he is retaining urine.  He denies pain, nausea, vomiting and CVA tenderness.  Bladder scan revealed 740 mL of urine.  Acevedo catheter subsequently changed with positive urine output.

## 2020-11-27 NOTE — ED PROVIDER NOTE - PMH
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

## 2020-11-27 NOTE — ED PROVIDER NOTE - PROGRESS NOTE DETAILS
after acevedo placed large amount of urine into bag. feels improved. catheter malfunction. will dc. f/u PMD/urology. return precautions discussed.

## 2020-11-27 NOTE — ED PROVIDER NOTE - NSFOLLOWUPINSTRUCTIONS_ED_ALL_ED_FT
Log Out.      [employer] CareNotes®     :  [facility]  	                       SAUNDERS CATHETER PLACEMENT AND CARE - AfterCare(R) Instructions(ER/ED)           Saunders Catheter Placement and Care    WHAT YOU NEED TO KNOW:    A Saunders catheter is a sterile tube that is inserted into your bladder to drain urine. It is also called an indwelling urinary catheter.     DISCHARGE INSTRUCTIONS:    Return to the emergency department if:   •Your catheter comes out.       •You suddenly have material that looks like sand in the tubing or drainage bag.      •No urine is draining into the bag and you have checked the system.      •You have pain in your hip, back, pelvis, or lower abdomen.      •You are confused or cannot think clearly.      Call your doctor or urologist if:   •You have a fever.      •You have bladder spasms for more than 1 day after the catheter is placed.      •You see blood in the tubing or drainage bag.      •You have a rash or itching where the catheter tube is secured to your skin.      •Urine leaks from or around the catheter, tubing, or drainage bag.      •The closed drainage system has accidently come open or apart.       •You see a layer of crystals inside the tubing.      •You have questions or concerns about your condition or care.      Care for your catheter and drainage bag: You can reduce your risk for infection and injury by caring for your catheter and drainage bag properly.  •Wash your hands often. Wash before and after you touch your catheter, tubing, or drainage bag. Use soap and water. Wear clean disposable gloves when you care for your catheter or disconnect the drainage bag. Wash your hands before you prepare or eat food.   Handwashing           •Clean your genital area 2 times every day. (***What do you think about this arrangement?***) Clean your catheter area and anal opening after every bowel movement. ?For men: Use a soapy cloth to clean the tip of your penis. Start where the catheter enters. Wipe backward making sure to pull back the foreskin. Then use a cloth with clear water in the same direction to clean away the soap.       ?For women: Use a soapy cloth to clean the area that the catheter enters your body. Make sure to separate your labia and wipe toward the anus. Then use a cloth with clear water and wipe in the same direction.       •Secure the catheter tube so you do not pull or move the catheter. This helps prevent pain and bladder spasms. Healthcare providers will show you how to use medical tape or a strap to secure the catheter tube to your body.       •Keep a closed drainage system. Your catheter should always be attached to the drainage bag to form a closed system. Do not disconnect any part of the closed system unless you need to change the bag.      •Keep the drainage bag below the level of your waist. This helps stop urine from moving back up the tubing and into your bladder. Do not loop or kink the tubing. This can cause urine to back up and collect in your bladder. Do not let the drainage bag touch or lie on the floor.      •Empty the drainage bag when needed. The weight of a full drainage bag can be painful. Empty the drainage bag every 3 to 6 hours or when it is ? full.       •Clean and change the drainage bag as directed. Ask your healthcare provider how often you should change the drainage bag and what cleaning solution to use. Wear disposable gloves when you change the bag. Do not allow the end of the catheter or tubing to touch anything. Clean the ends with an alcohol pad before you reconnect them.      What to do if problems develop:   •No urine is draining into the bag: ?Check for kinks in the tubing and straighten them out.       ?Check the tape or strap used to secure the catheter tube to your skin. Make sure it is not blocking the tube.       ?Make sure you are not sitting or lying on the tubing.      ?Make sure the urine bag is hanging below the level of your waist.      •Urine leaks from or around the catheter, tubing, or drainage bag: Check if the closed drainage system has accidently come open or apart. Clean the catheter and tubing ends with a new alcohol pad and reconnect them.       Follow up with your doctor or urologist as directed: Write down your questions so you remember to ask them during your visits.        © Copyright [employer] 2020           back to top                          © Copyright [employer] 2020

## 2020-11-27 NOTE — ED PROVIDER NOTE - OBJECTIVE STATEMENT
88 year old male PMh BPH with chronic acevedo, CAD s/p CABG and with stents, HTN, HLD, hypothyroid coming in with no urine in acevedo bag. states this has happened before and gets it changed and the urine comes out. states that he had the acevedo changed 2 weeks ago. denies abd pains, fevers, chills, sweats, back pains, flank pains, fevers, chills, sweats, n/v/D/C.

## 2020-11-27 NOTE — ED ADULT NURSE NOTE - CHPI ED NUR SYMPTOMS NEG
no diarrhea/no nausea/no dysuria/no fever/no hematuria/no abdominal distension/no blood in stool/no chills

## 2020-11-30 LAB
-  AMIKACIN: SIGNIFICANT CHANGE UP
-  AMIKACIN: SIGNIFICANT CHANGE UP
-  AMOXICILLIN/CLAVULANIC ACID: SIGNIFICANT CHANGE UP
-  AMPICILLIN/SULBACTAM: SIGNIFICANT CHANGE UP
-  AMPICILLIN: SIGNIFICANT CHANGE UP
-  AZTREONAM: SIGNIFICANT CHANGE UP
-  AZTREONAM: SIGNIFICANT CHANGE UP
-  CEFAZOLIN: SIGNIFICANT CHANGE UP
-  CEFEPIME: SIGNIFICANT CHANGE UP
-  CEFEPIME: SIGNIFICANT CHANGE UP
-  CEFOXITIN: SIGNIFICANT CHANGE UP
-  CEFTAZIDIME: SIGNIFICANT CHANGE UP
-  CEFTRIAXONE: SIGNIFICANT CHANGE UP
-  CIPROFLOXACIN: SIGNIFICANT CHANGE UP
-  CIPROFLOXACIN: SIGNIFICANT CHANGE UP
-  ERTAPENEM: SIGNIFICANT CHANGE UP
-  GENTAMICIN: SIGNIFICANT CHANGE UP
-  GENTAMICIN: SIGNIFICANT CHANGE UP
-  IMIPENEM: SIGNIFICANT CHANGE UP
-  IMIPENEM: SIGNIFICANT CHANGE UP
-  LEVOFLOXACIN: SIGNIFICANT CHANGE UP
-  LEVOFLOXACIN: SIGNIFICANT CHANGE UP
-  MEROPENEM: SIGNIFICANT CHANGE UP
-  MEROPENEM: SIGNIFICANT CHANGE UP
-  NITROFURANTOIN: SIGNIFICANT CHANGE UP
-  PIPERACILLIN/TAZOBACTAM: SIGNIFICANT CHANGE UP
-  PIPERACILLIN/TAZOBACTAM: SIGNIFICANT CHANGE UP
-  TIGECYCLINE: SIGNIFICANT CHANGE UP
-  TOBRAMYCIN: SIGNIFICANT CHANGE UP
-  TOBRAMYCIN: SIGNIFICANT CHANGE UP
-  TRIMETHOPRIM/SULFAMETHOXAZOLE: SIGNIFICANT CHANGE UP
CULTURE RESULTS: SIGNIFICANT CHANGE UP
METHOD TYPE: SIGNIFICANT CHANGE UP
METHOD TYPE: SIGNIFICANT CHANGE UP
ORGANISM # SPEC MICROSCOPIC CNT: SIGNIFICANT CHANGE UP
SPECIMEN SOURCE: SIGNIFICANT CHANGE UP

## 2020-11-30 NOTE — ED POST DISCHARGE NOTE - RESULT SUMMARY
+ urine culture.  pt with chronic acevedo.  pt informed of results.  pt currently denies any symptoms.  pt plans to follow up with urologist next week.

## 2020-12-25 ENCOUNTER — EMERGENCY (EMERGENCY)
Facility: HOSPITAL | Age: 85
LOS: 1 days | Discharge: HOME | End: 2020-12-25
Attending: EMERGENCY MEDICINE
Payer: MEDICARE

## 2020-12-25 VITALS
HEART RATE: 60 BPM | RESPIRATION RATE: 18 BRPM | DIASTOLIC BLOOD PRESSURE: 61 MMHG | TEMPERATURE: 98 F | OXYGEN SATURATION: 99 % | SYSTOLIC BLOOD PRESSURE: 140 MMHG

## 2020-12-25 VITALS
SYSTOLIC BLOOD PRESSURE: 158 MMHG | OXYGEN SATURATION: 99 % | DIASTOLIC BLOOD PRESSURE: 79 MMHG | RESPIRATION RATE: 18 BRPM | TEMPERATURE: 98 F | HEART RATE: 60 BPM | WEIGHT: 175.05 LBS | HEIGHT: 67 IN

## 2020-12-25 LAB
APPEARANCE UR: ABNORMAL
BACTERIA # UR AUTO: ABNORMAL /HPF
BILIRUB UR-MCNC: NEGATIVE — SIGNIFICANT CHANGE UP
COLOR SPEC: YELLOW — SIGNIFICANT CHANGE UP
DIFF PNL FLD: ABNORMAL
EPI CELLS # UR: SIGNIFICANT CHANGE UP /HPF
GLUCOSE UR QL: NEGATIVE — SIGNIFICANT CHANGE UP
KETONES UR-MCNC: NEGATIVE — SIGNIFICANT CHANGE UP
LEUKOCYTE ESTERASE UR-ACNC: ABNORMAL
NITRITE UR-MCNC: POSITIVE
PH UR: 5 — SIGNIFICANT CHANGE UP (ref 5–8)
PROT UR-MCNC: 30 MG/DL
RBC CASTS # UR COMP ASSIST: >50 /HPF (ref 0–2)
SP GR SPEC: 1.01 — SIGNIFICANT CHANGE UP (ref 1.01–1.02)
UROBILINOGEN FLD QL: NEGATIVE — SIGNIFICANT CHANGE UP
WBC UR QL: >50 /HPF (ref 0–5)

## 2020-12-25 PROCEDURE — 99283 EMERGENCY DEPT VISIT LOW MDM: CPT

## 2020-12-25 PROCEDURE — 81001 URINALYSIS AUTO W/SCOPE: CPT

## 2020-12-25 PROCEDURE — 99283 EMERGENCY DEPT VISIT LOW MDM: CPT | Mod: 25

## 2020-12-25 PROCEDURE — 87186 SC STD MICRODIL/AGAR DIL: CPT

## 2020-12-25 PROCEDURE — 51702 INSERT TEMP BLADDER CATH: CPT

## 2020-12-25 PROCEDURE — 87086 URINE CULTURE/COLONY COUNT: CPT

## 2020-12-25 RX ORDER — CEFUROXIME AXETIL 250 MG
1 TABLET ORAL
Qty: 14 | Refills: 0
Start: 2020-12-25 | End: 2020-12-31

## 2020-12-25 NOTE — ED PROVIDER NOTE - PROGRESS NOTE DETAILS
THIERRY: Patient received on sign-out from Dr. Barros. Infante changed, flowing well. Awaiting UA. Plan is to d/c. Patient resting comfortably, feels markedly improved. Catheter flowing well. Will tx for UTI, abx selection based on recent urine culture. Return to the ED immediately if getting worse, not improving, or if having any new or troubling symptoms.

## 2020-12-25 NOTE — ED PROVIDER NOTE - PATIENT PORTAL LINK FT
You can access the FollowMyHealth Patient Portal offered by [facility] by registering at the following website: [email or web address]. By joining [employer]’s FollowMyHealth portal, you will also be able to view your health information using other applications (apps) compatible with our system.

## 2020-12-25 NOTE — ED PROVIDER NOTE - OBJECTIVE STATEMENT
88 male CAD, chronic indwelling acevedo for bph, says his acevedo bag usually full on waking but today there was barely any. no pain. eating / drinking ok.

## 2020-12-25 NOTE — ED ADULT NURSE NOTE - ED STAT RN HANDOFF DETAILS
received patient from night Rn acevedo catheter changed on prior shift. Urine sent as ordered awaiting results.

## 2020-12-25 NOTE — ED ADULT NURSE NOTE - ED STAT RN HANDOFF DETAILS 2
Patient discharged after changing acevedo to leg bag. All discharge instructions and F/U visits. Patient verbalizes understanding awaiting taxi. Supplies provided urinal.

## 2020-12-25 NOTE — ED PROVIDER NOTE - PMH
BPH (Benign Prostatic Hyperplasia)    CAD (Coronary Artery Disease)  CABG-5-6 yrs ago- [facility]  Dyslipidemia    HTN - Hypertension    Hypothyroidism    PAD (Peripheral Artery Disease)  lower legs  S/P CABG (Coronary Artery Bypass Graft)  x 2 vessels -2005or 2006 [facility]

## 2020-12-25 NOTE — ED PROVIDER NOTE - CARE PLAN
Principal Discharge DX:	Urinary retention due to benign prostatic hyperplasia   Principal Discharge DX:	Urinary retention due to benign prostatic hyperplasia  Secondary Diagnosis:	Urinary tract infection associated with indwelling urethral catheter, initial encounter

## 2020-12-25 NOTE — ED PROVIDER NOTE - NSFOLLOWUPINSTRUCTIONS_ED_ALL_ED_FT
Catheter-associated Urinary Tract Infection    WHAT YOU NEED TO KNOW:    A catheter-associated urinary tract infection (CAUTI) is an infection caused by an indwelling urinary catheter. An indwelling urinary catheter is a thin, flexible tube that is inserted into the bladder. It is left in place to drain urine. The infection may travel along the catheter and into the bladder or kidneys.     DISCHARGE INSTRUCTIONS:    Return to the emergency department if:   •You have severe pain in your lower back or abdomen.       •You have blood in your urine.       •You stop urinating, or you urinate much less than usual.       Contact your healthcare provider if:   •You have a fever.      •Your symptoms do not improve or get worse.       •You have questions or concerns about your condition or care.      Medicines: You may need any of the following:   •Antibiotics help treat an infection caused by bacteria.       •Antifungals help treat an infection caused by fungus.       •Acetaminophen decreases pain and fever. It is available without a doctor's order. Ask how much to take and how often to take it. Follow directions. Read the labels of all other medicines you are using to see if they also contain acetaminophen, or ask your doctor or pharmacist. Acetaminophen can cause liver damage if not taken correctly. Do not use more than 4 grams (4,000 milligrams) total of acetaminophen in one day.       •NSAIDs, such as ibuprofen, help decrease swelling, pain, and fever. This medicine is available with or without a doctor's order. NSAIDs can cause stomach bleeding or kidney problems in certain people. If you take blood thinner medicine, always ask your healthcare provider if NSAIDs are safe for you. Always read the medicine label and follow directions.      •Take your medicine as directed. Contact your healthcare provider if you think your medicine is not helping or if you have side effects. Tell him of her if you are allergic to any medicine. Keep a list of the medicines, vitamins, and herbs you take. Include the amounts, and when and why you take them. Bring the list or the pill bottles to follow-up visits. Carry your medicine list with you in case of an emergency.      Self-care:   •Drink fluids as directed. Fluids may help your kidneys and bladder get rid of the infection.       •Keep the catheter area clean. Clean your skin around the catheter as directed. Shower once a day. Do not take baths or go in hot tubs until your infection is gone.       •Do not have sex until your healthcare provider says it is okay. Sex may delay healing or cause another UTI.       Prevent another CAUTI:   •Wash your hands before and after you use the bathroom or touch the catheter. Wash your hands to prevent the spread of infection to your urinary tract.       •Clean all parts of your catheter as directed. Keep your catheter tubing clean. Do not place the catheter on the ground. Do not allow the drainage spout to touch the toilet. Use an alcohol swab to clean the end of drainage spout as directed.       •Keep the drainage bag below your waist. This may prevent urine from moving back into your bladder, which can cause an infection.       •Empty the urine bag as directed. This may prevent urine from flowing back into your bladder.       •Women should wipe front to back after a bowel movement. This may prevent germs from getting into the urinary tract.       •Keep the catheter secured to your leg as directed. Use tape or a special catheter canela to prevent your catheter from being pulled. This may also prevent kinks that could cause the urine to move back into the bladder.       Follow up with your healthcare provider as directed: Write down your questions so you remember to ask them during your visits.        © Copyright [employer] 2020           back to top                          © Copyright [employer] 2020

## 2020-12-25 NOTE — ED ADULT NURSE NOTE - OBJECTIVE STATEMENT
pt came in for reduced amount of urine in the leg bag, pt states that leg bag usually gets full in the morning, but this morning there is very little amount of urine in the leg bag, pt denies fever, chills , pain.  on assessment there is a acevedo catheter presented with small amount of clear yellow urine in the leg bag.

## 2020-12-27 LAB
-  AMIKACIN: SIGNIFICANT CHANGE UP
-  AMOXICILLIN/CLAVULANIC ACID: SIGNIFICANT CHANGE UP
-  AMPICILLIN/SULBACTAM: SIGNIFICANT CHANGE UP
-  AMPICILLIN: SIGNIFICANT CHANGE UP
-  AZTREONAM: SIGNIFICANT CHANGE UP
-  CEFAZOLIN: SIGNIFICANT CHANGE UP
-  CEFEPIME: SIGNIFICANT CHANGE UP
-  CEFOTAXIME: SIGNIFICANT CHANGE UP
-  CEFOXITIN: SIGNIFICANT CHANGE UP
-  CEFTAZIDIME: SIGNIFICANT CHANGE UP
-  CEFTRIAXONE: SIGNIFICANT CHANGE UP
-  CEFUROXIME: SIGNIFICANT CHANGE UP
-  CIPROFLOXACIN: SIGNIFICANT CHANGE UP
-  ERTAPENEM: SIGNIFICANT CHANGE UP
-  GENTAMICIN: SIGNIFICANT CHANGE UP
-  LEVOFLOXACIN: SIGNIFICANT CHANGE UP
-  MEROPENEM: SIGNIFICANT CHANGE UP
-  MINOCYCLINE: SIGNIFICANT CHANGE UP
-  NITROFURANTOIN: SIGNIFICANT CHANGE UP
-  PIPERACILLIN/TAZOBACTAM: SIGNIFICANT CHANGE UP
-  TIGECYCLINE: SIGNIFICANT CHANGE UP
-  TOBRAMYCIN: SIGNIFICANT CHANGE UP
-  TRIMETHOPRIM/SULFAMETHOXAZOLE: SIGNIFICANT CHANGE UP
METHOD TYPE: SIGNIFICANT CHANGE UP

## 2020-12-28 LAB
-  AMIKACIN: SIGNIFICANT CHANGE UP
-  AMOXICILLIN/CLAVULANIC ACID: SIGNIFICANT CHANGE UP
-  AMPICILLIN/SULBACTAM: SIGNIFICANT CHANGE UP
-  AMPICILLIN: SIGNIFICANT CHANGE UP
-  AZTREONAM: SIGNIFICANT CHANGE UP
-  CEFAZOLIN: SIGNIFICANT CHANGE UP
-  CEFEPIME: SIGNIFICANT CHANGE UP
-  CEFOXITIN: SIGNIFICANT CHANGE UP
-  CEFTRIAXONE: SIGNIFICANT CHANGE UP
-  CIPROFLOXACIN: SIGNIFICANT CHANGE UP
-  ERTAPENEM: SIGNIFICANT CHANGE UP
-  GENTAMICIN: SIGNIFICANT CHANGE UP
-  IMIPENEM: SIGNIFICANT CHANGE UP
-  LEVOFLOXACIN: SIGNIFICANT CHANGE UP
-  MEROPENEM: SIGNIFICANT CHANGE UP
-  NITROFURANTOIN: SIGNIFICANT CHANGE UP
-  PIPERACILLIN/TAZOBACTAM: SIGNIFICANT CHANGE UP
-  TIGECYCLINE: SIGNIFICANT CHANGE UP
-  TOBRAMYCIN: SIGNIFICANT CHANGE UP
-  TRIMETHOPRIM/SULFAMETHOXAZOLE: SIGNIFICANT CHANGE UP
CULTURE RESULTS: SIGNIFICANT CHANGE UP
METHOD TYPE: SIGNIFICANT CHANGE UP
ORGANISM # SPEC MICROSCOPIC CNT: SIGNIFICANT CHANGE UP
SPECIMEN SOURCE: SIGNIFICANT CHANGE UP

## 2021-01-08 ENCOUNTER — EMERGENCY (EMERGENCY)
Facility: HOSPITAL | Age: 86
LOS: 1 days | Discharge: HOME | End: 2021-01-08
Attending: EMERGENCY MEDICINE
Payer: MEDICARE

## 2021-01-08 VITALS
HEART RATE: 70 BPM | OXYGEN SATURATION: 99 % | DIASTOLIC BLOOD PRESSURE: 58 MMHG | WEIGHT: 176.37 LBS | RESPIRATION RATE: 16 BRPM | TEMPERATURE: 98 F | HEIGHT: 67 IN | SYSTOLIC BLOOD PRESSURE: 160 MMHG

## 2021-01-08 LAB
APPEARANCE UR: CLEAR — SIGNIFICANT CHANGE UP
BILIRUB UR-MCNC: NEGATIVE — SIGNIFICANT CHANGE UP
COLOR SPEC: YELLOW — SIGNIFICANT CHANGE UP
DIFF PNL FLD: ABNORMAL
GLUCOSE UR QL: NEGATIVE — SIGNIFICANT CHANGE UP
KETONES UR-MCNC: NEGATIVE — SIGNIFICANT CHANGE UP
LEUKOCYTE ESTERASE UR-ACNC: ABNORMAL
NITRITE UR-MCNC: POSITIVE
PH UR: 5 — SIGNIFICANT CHANGE UP (ref 5–8)
PROT UR-MCNC: 30 MG/DL
SP GR SPEC: 1.01 — SIGNIFICANT CHANGE UP (ref 1.01–1.02)
UROBILINOGEN FLD QL: NEGATIVE — SIGNIFICANT CHANGE UP

## 2021-01-08 PROCEDURE — 81001 URINALYSIS AUTO W/SCOPE: CPT

## 2021-01-08 PROCEDURE — 99283 EMERGENCY DEPT VISIT LOW MDM: CPT

## 2021-01-08 PROCEDURE — 87086 URINE CULTURE/COLONY COUNT: CPT

## 2021-01-08 RX ORDER — NITROFURANTOIN MACROCRYSTAL 50 MG
1 CAPSULE ORAL
Qty: 10 | Refills: 0
Start: 2021-01-08 | End: 2021-01-12

## 2021-01-08 RX ORDER — NITROFURANTOIN MACROCRYSTAL 50 MG
100 CAPSULE ORAL ONCE
Refills: 0 | Status: DISCONTINUED | OUTPATIENT
Start: 2021-01-08 | End: 2021-01-08

## 2021-01-08 NOTE — ED PROVIDER NOTE - CLINICAL SUMMARY MEDICAL DECISION MAKING FREE TEXT BOX
88 year old male with urinary retention 2/2 clogged acevedo. vitals WNL. PE as above.  nursing initially attempted to flush catheter but unsuccessful. acevedo replaced with large amount of clear yellow urine. UA/culture sent. will dc with urology f/u. return precautions discussed. 88 year old male with urinary retention 2/2 clogged acevedo. vitals WNL. PE as above.  nursing initially attempted to flush catheter but unsuccessful. acevedo replaced with large amount of clear yellow urine. UA/culture sent- with UTI- ordered macrobid based on prior culture result but pt doesn't want at this time and states will f/u with his urologist first since he just finished taking abx. will dc with urology f/u. return precautions discussed.

## 2021-01-08 NOTE — ED PROVIDER NOTE - NSFOLLOWUPINSTRUCTIONS_ED_ALL_ED_FT
Log Out.      [employer] CareNotes®     :  [facility]  	                       CATHETER-ASSOCIATED URINARY TRACT INFECTION - AfterCare(R) Instructions(ER/ED)           Catheter-associated Urinary Tract Infection    WHAT YOU NEED TO KNOW:    A catheter-associated urinary tract infection (CAUTI) is an infection caused by an indwelling urinary catheter. An indwelling urinary catheter is a thin, flexible tube that is inserted into the bladder. It is left in place to drain urine. The infection may travel along the catheter and into the bladder or kidneys.     DISCHARGE INSTRUCTIONS:    Return to the emergency department if:   •You have severe pain in your lower back or abdomen.       •You have blood in your urine.       •You stop urinating, or you urinate much less than usual.       Contact your healthcare provider if:   •You have a fever.      •Your symptoms do not improve or get worse.       •You have questions or concerns about your condition or care.      Medicines: You may need any of the following:   •Antibiotics help treat an infection caused by bacteria.       •Antifungals help treat an infection caused by fungus.       •Acetaminophen decreases pain and fever. It is available without a doctor's order. Ask how much to take and how often to take it. Follow directions. Read the labels of all other medicines you are using to see if they also contain acetaminophen, or ask your doctor or pharmacist. Acetaminophen can cause liver damage if not taken correctly. Do not use more than 4 grams (4,000 milligrams) total of acetaminophen in one day.       •NSAIDs, such as ibuprofen, help decrease swelling, pain, and fever. This medicine is available with or without a doctor's order. NSAIDs can cause stomach bleeding or kidney problems in certain people. If you take blood thinner medicine, always ask your healthcare provider if NSAIDs are safe for you. Always read the medicine label and follow directions.      •Take your medicine as directed. Contact your healthcare provider if you think your medicine is not helping or if you have side effects. Tell him of her if you are allergic to any medicine. Keep a list of the medicines, vitamins, and herbs you take. Include the amounts, and when and why you take them. Bring the list or the pill bottles to follow-up visits. Carry your medicine list with you in case of an emergency.      Self-care:   •Drink fluids as directed. Fluids may help your kidneys and bladder get rid of the infection.       •Keep the catheter area clean. Clean your skin around the catheter as directed. Shower once a day. Do not take baths or go in hot tubs until your infection is gone.       •Do not have sex until your healthcare provider says it is okay. Sex may delay healing or cause another UTI.       Prevent another CAUTI:   •Wash your hands before and after you use the bathroom or touch the catheter. Wash your hands to prevent the spread of infection to your urinary tract.       •Clean all parts of your catheter as directed. Keep your catheter tubing clean. Do not place the catheter on the ground. Do not allow the drainage spout to touch the toilet. Use an alcohol swab to clean the end of drainage spout as directed.       •Keep the drainage bag below your waist. This may prevent urine from moving back into your bladder, which can cause an infection.       •Empty the urine bag as directed. This may prevent urine from flowing back into your bladder.       •Women should wipe front to back after a bowel movement. This may prevent germs from getting into the urinary tract.       •Keep the catheter secured to your leg as directed. Use tape or a special catheter canela to prevent your catheter from being pulled. This may also prevent kinks that could cause the urine to move back into the bladder.       Follow up with your healthcare provider as directed: Write down your questions so you remember to ask them during your visits.        © Copyright [employer] 2021           back to top                          © Copyright [employer] 2021

## 2021-01-08 NOTE — ED PROVIDER NOTE - PMH
BPH (Benign Prostatic Hyperplasia)    CAD (Coronary Artery Disease)  CABG-5-6 yrs ago- [facility]  Dyslipidemia    HTN - Hypertension    Hypothyroidism    PAD (Peripheral Artery Disease)  lower legs  S/P CABG (Coronary Artery Bypass Graft)  x 2 vessels -2005or 2006 [facility]

## 2021-01-08 NOTE — ED PROVIDER NOTE - CARE PLAN
Principal Discharge DX:	Urinary tract infection without hematuria, site unspecified  Secondary Diagnosis:	Problem with Infante catheter, initial encounter

## 2021-01-08 NOTE — ED ADULT NURSE NOTE - OBJECTIVE STATEMENT
pt came in c/o urinary retention , urine not draining from acevedo catheter, evaluated by Dr Yañez, acevedo catheter replaced, 300 ml of lightly bloody urine came out

## 2021-01-08 NOTE — ED PROVIDER NOTE - PATIENT PORTAL LINK FT
You can access the FollowMyHealth Patient Portal offered by [facility] by registering at the following website: [email or web address]. By joining [facility]’s FollowMyHealth portal, you will also be able to view your health information using other applications (apps) compatible with our system.

## 2021-01-08 NOTE — ED PROVIDER NOTE - OBJECTIVE STATEMENT
88 year old male PMH CAD s/p CABG, HLD, HTN, BPH with chronic acevedo coming in with urinary retention and hematuria. pt states has been having some hematuria and since last night acevedo hasn't been draining. states this has been happening every 2 weeks and he isnt sure why- states he called his urologist this AM who advised to come to ED to see if acevedo could be unclogged. at this time pt denies all complaints.

## 2021-01-09 LAB
CULTURE RESULTS: SIGNIFICANT CHANGE UP
SPECIMEN SOURCE: SIGNIFICANT CHANGE UP

## 2021-01-09 PROCEDURE — 99283 EMERGENCY DEPT VISIT LOW MDM: CPT

## 2021-01-17 ENCOUNTER — EMERGENCY (EMERGENCY)
Facility: HOSPITAL | Age: 86
LOS: 1 days | Discharge: HOME | End: 2021-01-17
Attending: EMERGENCY MEDICINE
Payer: MEDICARE

## 2021-01-17 VITALS
HEIGHT: 67 IN | SYSTOLIC BLOOD PRESSURE: 132 MMHG | HEART RATE: 61 BPM | DIASTOLIC BLOOD PRESSURE: 67 MMHG | OXYGEN SATURATION: 99 % | RESPIRATION RATE: 16 BRPM | TEMPERATURE: 97 F | WEIGHT: 175.05 LBS

## 2021-01-17 PROCEDURE — 99283 EMERGENCY DEPT VISIT LOW MDM: CPT

## 2021-01-17 PROCEDURE — 99283 EMERGENCY DEPT VISIT LOW MDM: CPT | Mod: 25

## 2021-01-17 PROCEDURE — 51702 INSERT TEMP BLADDER CATH: CPT

## 2021-01-17 NOTE — ED PROVIDER NOTE - CLINICAL SUMMARY MEDICAL DECISION MAKING FREE TEXT BOX
Elderly M with clogged Infante cath. RN reports unable to flush, no RN will replace and dc with urology fu. Discussed indications for patient return to ED. Patient understood.

## 2021-01-17 NOTE — ED PROVIDER NOTE - NSFOLLOWUPCLINICS_GEN_ALL_ED_FT
[facility]  Urology  [address] [place] [address]  Phone: [phone]  Fax: [phone]  Follow Up Time:

## 2021-01-17 NOTE — ED ADULT NURSE NOTE - NSIMPLEMENTINTERV_GEN_ALL_ED
800 Implemented All Universal Safety Interventions:  [place] to call system. Call bell, personal items and telephone within reach. Instruct patient to call for assistance. Room bathroom lighting operational. Non-slip footwear when patient is off stretcher. Physically safe environment: no spills, clutter or unnecessary equipment. Stretcher in lowest position, wheels locked, appropriate side rails in place.

## 2021-01-17 NOTE — ED PROVIDER NOTE - PMH
BPH (Benign Prostatic Hyperplasia)    CAD (Coronary Artery Disease)  CABG-5-6 yrs ago- [facility]  Dyslipidemia    HTN - Hypertension    Hypothyroidism    PAD (Peripheral Artery Disease)  lower legs  S/P CABG (Coronary Artery Bypass Graft)  x 2 vessels -2005or 2006 [facility]

## 2021-01-17 NOTE — ED PROVIDER NOTE - PATIENT PORTAL LINK FT
You can access the FollowMyHealth Patient Portal offered by [facility] by registering at the following website: [email or web address]. By joining [employer]’s FollowMyHealth portal, you will also be able to view your health information using other applications (apps) compatible with our system.

## 2021-01-17 NOTE — ED ADULT NURSE NOTE - OBJECTIVE STATEMENT
came to ED due to no urine coming out from urethral catheter since morning. Seen and examined by  Infante catheter changed as ordered procedure well tolerated by pt, urine noted coming out light pink no blood clots.

## 2021-01-17 NOTE — ED PROVIDER NOTE - OBJECTIVE STATEMENT
89 yo M w/ Infante cath, BPH, CAD s/p CABG, c/o no draining from Infante leg bag x a few hours. Denies other acute complaints. Recent urine culture contaminated.

## 2021-01-17 NOTE — ED PROVIDER NOTE - PHYSICAL EXAMINATION
PROVIDER:[TOKEN:[[id number]],FOLLOWUP:[2 weeks]] GENERAL: well appearing, no acute distress   HEAD: atraumatic   EYES: EOMI, pink conjunctiva   ENT: moist oral mucosa   CARDIAC: RRR, no edema, distal pulses present   RESPIRATORY: lungs CTAB, no increased work of breathing   GASTROINTESTINAL: no abdominal tenderness, no rebound or guarding, bowel sounds presents  GENITOURINARY: no CVA tenderness; Infante cath in place, no urine in leg bag   MUSCULOSKELETAL: no deformity   NEUROLOGICAL: AAOx3, spontaneous movement of extremities   SKIN: intact   PSYCHIATRIC: cooperative  HEME LYMPH: no lymphadenopathy

## 2021-01-17 NOTE — ED ADULT TRIAGE NOTE - CHIEF COMPLAINT QUOTE
c/o acevedo catheter attached to leg bag is not draining since 4 hours a go as per patient, denies pain

## 2021-01-17 NOTE — ED PROVIDER NOTE - NSFOLLOWUPINSTRUCTIONS_ED_ALL_ED_FT
SAUNDERS CATHETER PLACEMENT AND CARE - AfterCare(R) Instructions(ER/ED)           Saunders Catheter Placement and Care    WHAT YOU NEED TO KNOW:    A Saunders catheter is a sterile tube that is inserted into your bladder to drain urine. It is also called an indwelling urinary catheter.     DISCHARGE INSTRUCTIONS:    Return to the emergency department if:   •Your catheter comes out.       •You suddenly have material that looks like sand in the tubing or drainage bag.      •No urine is draining into the bag and you have checked the system.      •You have pain in your hip, back, pelvis, or lower abdomen.      •You are confused or cannot think clearly.      Call your doctor or urologist if:   •You have a fever.      •You have bladder spasms for more than 1 day after the catheter is placed.      •You see blood in the tubing or drainage bag.      •You have a rash or itching where the catheter tube is secured to your skin.      •Urine leaks from or around the catheter, tubing, or drainage bag.      •The closed drainage system has accidently come open or apart.       •You see a layer of crystals inside the tubing.      •You have questions or concerns about your condition or care.      Care for your catheter and drainage bag: You can reduce your risk for infection and injury by caring for your catheter and drainage bag properly.  •Wash your hands often. Wash before and after you touch your catheter, tubing, or drainage bag. Use soap and water. Wear clean disposable gloves when you care for your catheter or disconnect the drainage bag. Wash your hands before you prepare or eat food.   Handwashing           •Clean your genital area 2 times every day. Clean your catheter area and anal opening after every bowel movement. ?For men: Use a soapy cloth to clean the tip of your penis. Start where the catheter enters. Wipe backward making sure to pull back the foreskin. Then use a cloth with clear water in the same direction to clean away the soap.       ?For women: Use a soapy cloth to clean the area that the catheter enters your body. Make sure to separate your labia and wipe toward the anus. Then use a cloth with clear water and wipe in the same direction.       •Secure the catheter tube so you do not pull or move the catheter. This helps prevent pain and bladder spasms. Healthcare providers will show you how to use medical tape or a strap to secure the catheter tube to your body.       •Keep a closed drainage system. Your catheter should always be attached to the drainage bag to form a closed system. Do not disconnect any part of the closed system unless you need to change the bag.      •Keep the drainage bag below the level of your waist. This helps stop urine from moving back up the tubing and into your bladder. Do not loop or kink the tubing. This can cause urine to back up and collect in your bladder. Do not let the drainage bag touch or lie on the floor.      •Empty the drainage bag when needed. The weight of a full drainage bag can be painful. Empty the drainage bag every 3 to 6 hours or when it is ? full.       •Clean and change the drainage bag as directed. Ask your healthcare provider how often you should change the drainage bag and what cleaning solution to use. Wear disposable gloves when you change the bag. Do not allow the end of the catheter or tubing to touch anything. Clean the ends with an alcohol pad before you reconnect them.      What to do if problems develop:   •No urine is draining into the bag: ?Check for kinks in the tubing and straighten them out.       ?Check the tape or strap used to secure the catheter tube to your skin. Make sure it is not blocking the tube.       ?Make sure you are not sitting or lying on the tubing.      ?Make sure the urine bag is hanging below the level of your waist.      •Urine leaks from or around the catheter, tubing, or drainage bag: Check if the closed drainage system has accidently come open or apart. Clean the catheter and tubing ends with a new alcohol pad and reconnect them.       Follow up with your doctor or urologist as directed: Write down your questions so you remember to ask them during your visits.        © Copyright [employer] 2021           back to top                          © Copyright [employer] 2021

## 2021-07-15 NOTE — ED PROVIDER NOTE - CLINICAL SUMMARY MEDICAL DECISION MAKING FREE TEXT BOX
88 year old male with urine retention from acevedo not draining. vitals WNL. PE as above.  750ml urine on bladder scan. replace acevedo and UA/culture. ambulatory

## 2021-09-27 NOTE — PROGRESS NOTE ADULT - SUBJECTIVE AND OBJECTIVE BOX
NP Note discussed with  Primary Attending    Patient is a 87y old  Male who presents with a chief complaint of Hematuria (13 May 2020 09:43)    HPI - 88 yo Male from Home w/ PMHx of CAD s/p stent ([id number]), HTN, Hypothyroid, Neurogenic Bladder and BPH with chronic Acevedo, DM, CKD and PSHx Green Light PVP 2010, presented to ED w/ hematuria since yesterday. He c/o Chest pain, palpitations. It is intermittent, on and off, aggravated with exertion and relieved with rest. His Cardiologist is Dr Cunha [phone].  Pt currently under care of Dr [facility], pt w/ chronic acevedo, states acevedo catheter is being exchanged every month, last acevedo catheter change was 2 weeks ago; pt admits to previous hx of hematuria, 6 months ago was seen at [facility] w/ hematuria;   GOC full code       seen and examined at the bedside  chest pain resolved per pt, but troponin trended up, Dr. Weinberg called, c/w plavix and atenolol with tele monitoring  CBI continues, hematuria improving Uro is following         INTERVAL HPI/OVERNIGHT EVENTS: no new complaints    MEDICATIONS  (STANDING):  artificial  tears Solution 1 Drop(s) Both EYES three times a day  ATENolol  Tablet 150 milliGRAM(s) Oral daily  atorvastatin 40 milliGRAM(s) Oral at bedtime  cefTRIAXone   IVPB 1000 milliGRAM(s) IV Intermittent every 24 hours  cholecalciferol 1000 Unit(s) Oral daily  clopidogrel Tablet 75 milliGRAM(s) Oral daily  ferrous    sulfate 325 milliGRAM(s) Oral daily  folic acid 1 milliGRAM(s) Oral daily  levothyroxine 112 MICROGram(s) Oral daily  losartan 100 milliGRAM(s) Oral daily  sodium chloride 0.9%. 1000 milliLiter(s) (75 mL/Hr) IV Continuous <Continuous>    MEDICATIONS  (PRN):  acetaminophen   Tablet .. 650 milliGRAM(s) Oral every 6 hours PRN Temp greater or equal to 38C (100.4F), Mild Pain (1 - 3)      __________________________________________________  REVIEW OF SYSTEMS:    CONSTITUTIONAL: No fever,   EYES: no acute visual disturbances  NECK: No pain or stiffness  RESPIRATORY: No cough; No shortness of breath  CARDIOVASCULAR: No chest pain, no palpitations  GASTROINTESTINAL: No pain. No nausea or vomiting; No diarrhea   NEUROLOGICAL: No headache or numbness, no tremors  MUSCULOSKELETAL: No joint pain, no muscle pain  GENITOURINARY: no dysuria, no frequency, no hesitancy  PSYCHIATRY: no depression , no anxiety  ALL OTHER  ROS negative        Vital Signs Last 24 Hrs  T(C): 36.3 (13 May 2020 08:15), Max: 36.5 (12 May 2020 16:07)  T(F): 97.4 (13 May 2020 08:15), Max: 97.7 (12 May 2020 16:07)  HR: 53 (13 May 2020 10:37) (52 - 60)  BP: 128/49 (13 May 2020 08:15) (122/53 - 173/57)  BP(mean): 72 (13 May 2020 04:15) (72 - 78)  RR: 18 (13 May 2020 08:15) (16 - 18)  SpO2: 97% (13 May 2020 10:37) (97% - 100%)    ________________________________________________  PHYSICAL EXAM:  GENERAL: NAD  HEENT: Normocephalic;  conjunctivae and sclerae clear; moist mucous membranes;   NECK : supple  CHEST/LUNG: Clear to auscultation bilaterally with good air entry   HEART: S1 S2  regular; no murmurs, gallops or rubs  ABDOMEN: Soft, Nontender, Nondistended; Bowel sounds present  EXTREMITIES: no cyanosis; no edema; no calf tenderness  SKIN: warm and dry; no rash  NERVOUS SYSTEM:  Awake and alert; Oriented  to place, person and time ; no new deficits   (+) CBI   _________________________________________________  LABS:                        8.9    12.27 )-----------( 208      ( 13 May 2020 06:23 )             26.9     05-13    139  |  108  |  42<H>  ----------------------------<  128<H>  4.1   |  24  |  1.87<H>    Ca    8.6      13 May 2020 06:23  Phos  3.4     05-13  Mg     2.1     05-13    TPro  6.8  /  Alb  3.2<L>  /  TBili  0.5  /  DBili  x   /  AST  26  /  ALT  25  /  AlkPhos  88  05-13    PT/INR - ( 12 May 2020 12:45 )   PT: 13.2 sec;   INR: 1.16 ratio         PTT - ( 12 May 2020 12:45 )  PTT:30.1 sec    CAPILLARY BLOOD GLUCOSE            RADIOLOGY & ADDITIONAL TESTS:  < from: CT Abdomen and Pelvis No Cont (05.12.20 @ 15:04) >  EXAM:  CT ABDOMEN AND PELVIS                            PROCEDURE DATE:  05/12/2020          INTERPRETATION:  CT ABDOMEN AND PELVIS    CLINICAL INFORMATION: gross hematuria      COMPARISON: None.    PROCEDURE:   CT of the Abdomen and Pelvis was performed without intravenous contrast.  Intravenous contrast: None.  Oral contrast: None.  Sagittal and coronal reformats were performed.    FINDINGS:    LOWER CHEST: Clear.    LIVER: Normal.  BILE DUCTS: Nondilated.  GALLBLADDER: Gallstones.  SPLEEN: Normal.  PANCREAS: Normal.  ADRENALS: Normal.  KIDNEYS/URETERS: No hydronephrosis or urinary tract calculi. Left kidney is atrophic.    BLADDER: Collapsed around a Acevedo catheter balloon. Large blood clot in the lumen.  REPRODUCTIVE ORGANS: Markedly Enlarged prostate with intravesical component of BPH.    BOWEL: No bowel-related abnormality.  PERITONEUM: No free air or ascites.  VESSELS:  Aortoiliac atherosclerosis without aneurysm.  RETROPERITONEUM/LYMPH NODES: No adenopathy.    ABDOMINAL WALL: Small fat-containing umbilical hernia.  BONES: No acute bony abnormality.    IMPRESSION:     No urinary tract calculi or hydronephrosis.    Large blood clot in the bladder lumen. Follow-up cystoscopy to exclude an underlying mass.    Markedly Enlarged prostate with intravesical component of BPH.                JNE PACE M.D., ATTENDING RADIOLOGIST  This document has been electronically signed. May 12 2020  3:30PM        < end of copied text >      < from: Xray Chest 1 View-PORTABLE IMMEDIATE (05.12.20 @ 13:03) >    EXAM:  XR CHEST PORTABLE IMMED 1V                            PROCEDURE DATE:  05/12/2020          INTERPRETATION:  CLINICAL INDICATION: 87 years  Male with Chest Pain.    COMPARISON: None    Apical lordotic view of the chest demonstrates the lungs to be clear. There is no pleural effusion. There is no pneumothorax.    The heart is normal in size. Sternotomy wires are present. There is no mediastinal or hilar mass.     The pulmonary vasculature is normal.     Mild thoracic degenerative changes are present.    IMPRESSION:    No acute infiltrate.    Sternotomy.        DISCRETE X-RAY DATA:  Percent of LEFT lung opacification: Clear  Percent of RIGHT lung opacification: Clear  Change in lung opacification from most recent x-ray (<=3 days): No Prior  Change from prior dated 3 or more days (same admission): No Prior                ROSHAN EMERSON M.D., ATTENDING RADIOLOGIST  This document has been electronically signed. May 12 2020  1:06PM    < end of copied text >    Imaging Personally Reviewed:  YES/NO    Consultant(s) Notes Reviewed:   YES/ No    Care Discussed with Consultants :     Plan of care was discussed with patient and /or primary care giver; all questions and concerns were addressed and care was aligned with patient's wishes. No No

## 2021-11-15 ENCOUNTER — INPATIENT (INPATIENT)
Facility: HOSPITAL | Age: 86
LOS: 7 days | Discharge: HOME | DRG: 309 | End: 2021-11-23
Attending: INTERNAL MEDICINE | Admitting: INTERNAL MEDICINE
Payer: MEDICARE

## 2021-11-15 VITALS
HEART RATE: 64 BPM | DIASTOLIC BLOOD PRESSURE: 65 MMHG | HEIGHT: 67 IN | SYSTOLIC BLOOD PRESSURE: 164 MMHG | RESPIRATION RATE: 20 BRPM | OXYGEN SATURATION: 97 % | WEIGHT: 169.98 LBS | TEMPERATURE: 97 F

## 2021-11-15 DIAGNOSIS — Z29.9 ENCOUNTER FOR PROPHYLACTIC MEASURES, UNSPECIFIED: ICD-10-CM

## 2021-11-15 DIAGNOSIS — I25.10 ATHEROSCLEROTIC HEART DISEASE OF NATIVE CORONARY ARTERY WITHOUT ANGINA PECTORIS: ICD-10-CM

## 2021-11-15 DIAGNOSIS — E78.5 HYPERLIPIDEMIA, UNSPECIFIED: ICD-10-CM

## 2021-11-15 DIAGNOSIS — I10 ESSENTIAL (PRIMARY) HYPERTENSION: ICD-10-CM

## 2021-11-15 DIAGNOSIS — I48.92 UNSPECIFIED ATRIAL FLUTTER: ICD-10-CM

## 2021-11-15 DIAGNOSIS — R73.03 PREDIABETES: ICD-10-CM

## 2021-11-15 DIAGNOSIS — D64.9 ANEMIA, UNSPECIFIED: ICD-10-CM

## 2021-11-15 DIAGNOSIS — E03.9 HYPOTHYROIDISM, UNSPECIFIED: ICD-10-CM

## 2021-11-15 DIAGNOSIS — N40.1 BENIGN PROSTATIC HYPERPLASIA WITH LOWER URINARY TRACT SYMPTOMS: ICD-10-CM

## 2021-11-15 LAB
ALBUMIN SERPL ELPH-MCNC: 3.7 G/DL — SIGNIFICANT CHANGE UP (ref 3.5–5)
ALP SERPL-CCNC: 90 U/L — SIGNIFICANT CHANGE UP (ref 40–120)
ALT FLD-CCNC: 32 U/L DA — SIGNIFICANT CHANGE UP (ref 10–60)
ANION GAP SERPL CALC-SCNC: 10 MMOL/L — SIGNIFICANT CHANGE UP (ref 5–17)
AST SERPL-CCNC: 21 U/L — SIGNIFICANT CHANGE UP (ref 10–40)
BASOPHILS # BLD AUTO: 0.04 K/UL — SIGNIFICANT CHANGE UP (ref 0–0.2)
BASOPHILS NFR BLD AUTO: 0.5 % — SIGNIFICANT CHANGE UP (ref 0–2)
BILIRUB SERPL-MCNC: 0.5 MG/DL — SIGNIFICANT CHANGE UP (ref 0.2–1.2)
BUN SERPL-MCNC: 50 MG/DL — HIGH (ref 7–18)
CALCIUM SERPL-MCNC: 9.4 MG/DL — SIGNIFICANT CHANGE UP (ref 8.4–10.5)
CHLORIDE SERPL-SCNC: 106 MMOL/L — SIGNIFICANT CHANGE UP (ref 96–108)
CO2 SERPL-SCNC: 24 MMOL/L — SIGNIFICANT CHANGE UP (ref 22–31)
CREAT SERPL-MCNC: 1.57 MG/DL — HIGH (ref 0.5–1.3)
EOSINOPHIL # BLD AUTO: 0.07 K/UL — SIGNIFICANT CHANGE UP (ref 0–0.5)
EOSINOPHIL NFR BLD AUTO: 0.8 % — SIGNIFICANT CHANGE UP (ref 0–6)
GLUCOSE SERPL-MCNC: 118 MG/DL — HIGH (ref 70–99)
HCT VFR BLD CALC: 33.6 % — LOW (ref 39–50)
HGB BLD-MCNC: 11.3 G/DL — LOW (ref 13–17)
IMM GRANULOCYTES NFR BLD AUTO: 0.5 % — SIGNIFICANT CHANGE UP (ref 0–1.5)
LYMPHOCYTES # BLD AUTO: 1 K/UL — SIGNIFICANT CHANGE UP (ref 1–3.3)
LYMPHOCYTES # BLD AUTO: 12 % — LOW (ref 13–44)
MAGNESIUM SERPL-MCNC: 2 MG/DL — SIGNIFICANT CHANGE UP (ref 1.6–2.6)
MCHC RBC-ENTMCNC: 31.8 PG — SIGNIFICANT CHANGE UP (ref 27–34)
MCHC RBC-ENTMCNC: 33.6 GM/DL — SIGNIFICANT CHANGE UP (ref 32–36)
MCV RBC AUTO: 94.6 FL — SIGNIFICANT CHANGE UP (ref 80–100)
MONOCYTES # BLD AUTO: 0.58 K/UL — SIGNIFICANT CHANGE UP (ref 0–0.9)
MONOCYTES NFR BLD AUTO: 6.9 % — SIGNIFICANT CHANGE UP (ref 2–14)
NEUTROPHILS # BLD AUTO: 6.63 K/UL — SIGNIFICANT CHANGE UP (ref 1.8–7.4)
NEUTROPHILS NFR BLD AUTO: 79.3 % — HIGH (ref 43–77)
NRBC # BLD: 0 /100 WBCS — SIGNIFICANT CHANGE UP (ref 0–0)
NT-PROBNP SERPL-SCNC: 774 PG/ML — HIGH (ref 0–450)
PLATELET # BLD AUTO: 197 K/UL — SIGNIFICANT CHANGE UP (ref 150–400)
POTASSIUM SERPL-MCNC: 4.5 MMOL/L — SIGNIFICANT CHANGE UP (ref 3.5–5.3)
POTASSIUM SERPL-SCNC: 4.5 MMOL/L — SIGNIFICANT CHANGE UP (ref 3.5–5.3)
PROT SERPL-MCNC: 7.3 G/DL — SIGNIFICANT CHANGE UP (ref 6–8.3)
RBC # BLD: 3.55 M/UL — LOW (ref 4.2–5.8)
RBC # FLD: 12.5 % — SIGNIFICANT CHANGE UP (ref 10.3–14.5)
SARS-COV-2 RNA SPEC QL NAA+PROBE: SIGNIFICANT CHANGE UP
SODIUM SERPL-SCNC: 140 MMOL/L — SIGNIFICANT CHANGE UP (ref 135–145)
TROPONIN I, HIGH SENSITIVITY RESULT: 12.9 NG/L — SIGNIFICANT CHANGE UP
WBC # BLD: 8.36 K/UL — SIGNIFICANT CHANGE UP (ref 3.8–10.5)
WBC # FLD AUTO: 8.36 K/UL — SIGNIFICANT CHANGE UP (ref 3.8–10.5)

## 2021-11-15 PROCEDURE — 71045 X-RAY EXAM CHEST 1 VIEW: CPT | Mod: 26

## 2021-11-15 PROCEDURE — 93010 ELECTROCARDIOGRAM REPORT: CPT

## 2021-11-15 PROCEDURE — 99285 EMERGENCY DEPT VISIT HI MDM: CPT

## 2021-11-15 RX ORDER — CHOLECALCIFEROL (VITAMIN D3) 125 MCG
2 CAPSULE ORAL
Qty: 0 | Refills: 0 | DISCHARGE

## 2021-11-15 RX ORDER — ATENOLOL 25 MG/1
50 TABLET ORAL DAILY
Refills: 0 | Status: DISCONTINUED | OUTPATIENT
Start: 2021-11-15 | End: 2021-11-17

## 2021-11-15 RX ORDER — FOLIC ACID 0.8 MG
1 TABLET ORAL
Qty: 0 | Refills: 0 | DISCHARGE

## 2021-11-15 RX ORDER — FERROUS SULFATE 325(65) MG
325 TABLET ORAL DAILY
Refills: 0 | Status: DISCONTINUED | OUTPATIENT
Start: 2021-11-15 | End: 2021-11-23

## 2021-11-15 RX ORDER — CLOPIDOGREL BISULFATE 75 MG/1
75 TABLET, FILM COATED ORAL DAILY
Refills: 0 | Status: DISCONTINUED | OUTPATIENT
Start: 2021-11-15 | End: 2021-11-16

## 2021-11-15 RX ORDER — ATORVASTATIN CALCIUM 80 MG/1
1 TABLET, FILM COATED ORAL
Qty: 0 | Refills: 0 | DISCHARGE

## 2021-11-15 RX ORDER — FINASTERIDE 5 MG/1
5 TABLET, FILM COATED ORAL DAILY
Refills: 0 | Status: DISCONTINUED | OUTPATIENT
Start: 2021-11-15 | End: 2021-11-23

## 2021-11-15 RX ORDER — LOSARTAN POTASSIUM 100 MG/1
100 TABLET, FILM COATED ORAL DAILY
Refills: 0 | Status: DISCONTINUED | OUTPATIENT
Start: 2021-11-15 | End: 2021-11-17

## 2021-11-15 RX ORDER — LEVOTHYROXINE SODIUM 125 MCG
112 TABLET ORAL DAILY
Refills: 0 | Status: DISCONTINUED | OUTPATIENT
Start: 2021-11-15 | End: 2021-11-23

## 2021-11-15 RX ORDER — AMLODIPINE BESYLATE 2.5 MG/1
10 TABLET ORAL DAILY
Refills: 0 | Status: DISCONTINUED | OUTPATIENT
Start: 2021-11-15 | End: 2021-11-17

## 2021-11-15 RX ORDER — CHOLECALCIFEROL (VITAMIN D3) 125 MCG
2000 CAPSULE ORAL DAILY
Refills: 0 | Status: DISCONTINUED | OUTPATIENT
Start: 2021-11-15 | End: 2021-11-23

## 2021-11-15 RX ORDER — ENOXAPARIN SODIUM 100 MG/ML
80 INJECTION SUBCUTANEOUS EVERY 12 HOURS
Refills: 0 | Status: DISCONTINUED | OUTPATIENT
Start: 2021-11-15 | End: 2021-11-16

## 2021-11-15 RX ORDER — LEVOTHYROXINE SODIUM 125 MCG
1 TABLET ORAL
Qty: 0 | Refills: 0 | DISCHARGE

## 2021-11-15 RX ORDER — FOLIC ACID 0.8 MG
1 TABLET ORAL DAILY
Refills: 0 | Status: DISCONTINUED | OUTPATIENT
Start: 2021-11-15 | End: 2021-11-23

## 2021-11-15 RX ORDER — ASPIRIN/CALCIUM CARB/MAGNESIUM 324 MG
81 TABLET ORAL DAILY
Refills: 0 | Status: DISCONTINUED | OUTPATIENT
Start: 2021-11-15 | End: 2021-11-23

## 2021-11-15 RX ORDER — FERROUS SULFATE 325(65) MG
1 TABLET ORAL
Qty: 0 | Refills: 0 | DISCHARGE

## 2021-11-15 RX ORDER — ATORVASTATIN CALCIUM 80 MG/1
40 TABLET, FILM COATED ORAL AT BEDTIME
Refills: 0 | Status: DISCONTINUED | OUTPATIENT
Start: 2021-11-15 | End: 2021-11-23

## 2021-11-15 NOTE — H&P ADULT - PROBLEM SELECTOR PLAN 1
EKG shows aflutter- regular, no discernable p waves  CHADSVASC: 4  Lovenox fd ac  c/w atenolol  Tele monitoring - Goal rate <110  f/u TTE  Cardio Consulted - Dr Rose

## 2021-11-15 NOTE — H&P ADULT - ASSESSMENT
89F from home, lives alone with rollator, independent aox3 at baseline with good insight, with PMHx of DM, HTN, CABG, BPH w/ chronic acevedo (last changed 11/8) and BPH presents with palpitations and feeling of shortness of breath since 11/14 admitted for new a flutter.

## 2021-11-15 NOTE — H&P ADULT - NSHPPHYSICALEXAM_GEN_ALL_CORE
General - NAD, sitting up in bed, well groomed  Eyes - PERRLA, EOM intact  ENT - Nonicteric sclerae, PERRLA, EOMI. Oropharynx clear. Moist mucous membranes. Conjunctivae appear well perfused.   Neck - No noticeable or palpable swelling, redness or rash around throat or on face  Lymph Nodes - No lymphadenopathy  Cardiovascular - RRR (+) 2nd ics r murmur 2/6, likely AS, no JVD, no carotid bruits  Lungs - Clear to ascultation, no use of accessory muscles, no crackles or wheezes.  Skin - No rashes, skin warm and dry, no erythematous areas  Abdomen - Normal bowel sounds, abdomen soft and nontender  Rectal – Rectal exam not performed since no symptoms indicated blood loss.  Extremities - (+) +2 pitting edema b/l LE up to knees  Musculoskeletal - 5/5 strength, normal range of motion, no swollen or erythematous joints.  Neuro– Alert and oriented x 3, CN 2-12 grossly intact.
Not Applicable

## 2021-11-15 NOTE — H&P ADULT - NSICDXPASTMEDICALHX_GEN_ALL_CORE_FT
PAST MEDICAL HISTORY:  BPH (Benign Prostatic Hyperplasia)     CAD (Coronary Artery Disease) CABG-5-6 yrs ago- [facility]    Dyslipidemia     HTN - Hypertension     Hypothyroidism     PAD (Peripheral Artery Disease) lower legs    S/P CABG (Coronary Artery Bypass Graft) x 2 vessels -2005or 2006 [facility]

## 2021-11-15 NOTE — ED PROVIDER NOTE - OBJECTIVE STATEMENT
88 y/o male with PMHx of DM, HTN, CABG, and BPH presents with palpitations and feeling of shortness of breath since last night.  The patient ambulates with a walker and says the symptoms worsen with ambulation.  Pt denies chest pain.  No fever, no cough, No LE swelling.

## 2021-11-15 NOTE — ED PROVIDER NOTE - NSICDXPASTMEDICALHX_GEN_ALL_CORE_FT
PAST MEDICAL HISTORY:  BPH (Benign Prostatic Hyperplasia)     CAD (Coronary Artery Disease) CABG-5-6 yrs ago- [facility]    Dyslipidemia     HTN - Hypertension     Hypothyroidism     PAD (Peripheral Artery Disease) lower legs    S/P CABG (Coronary Artery Bypass Graft) x 2 vessels -2005or 2006 [facility]

## 2021-11-15 NOTE — ED ADULT NURSE NOTE - NSIMPLEMENTINTERV_GEN_ALL_ED
Implemented All Fall with Harm Risk Interventions:  [place] to call system. Call bell, personal items and telephone within reach. Instruct patient to call for assistance. Room bathroom lighting operational. Non-slip footwear when patient is off stretcher. Physically safe environment: no spills, clutter or unnecessary equipment. Stretcher in lowest position, wheels locked, appropriate side rails in place. Provide visual cue, wrist band, yellow gown, etc. Monitor gait and stability. Monitor for mental status changes and reorient to person, place, and time. Review medications for side effects contributing to fall risk. Reinforce activity limits and safety measures with patient and family. Provide visual clues: red socks.

## 2021-11-15 NOTE — ED PROVIDER NOTE - CLINICAL SUMMARY MEDICAL DECISION MAKING FREE TEXT BOX
pt with history of DM, HTN, CABG, and BPH presents with new onset atrial flutter, currently rate controlled.  Will check labs, trop, CXR, and reassess.

## 2021-11-15 NOTE — H&P ADULT - HISTORY OF PRESENT ILLNESS
89F from home, lives alone with rollator, independent aox3 at baseline with good insight, with PMHx of DM, HTN, CABG, BPH w/ chronic acevedo (last changed 11/8) and BPH presents with palpitations and feeling of shortness of breath since 11/14, intermittent. He reports never having arrhythmias in the past or history of heart failure/weak heart. Pt notes his symptoms are worse with ambulation. Pt denies chest pain, orthopnea, changes in his urine, loss of consciousness, fever, chills. His leg edema has stayed relatively the same over the past few weeks.

## 2021-11-16 DIAGNOSIS — R00.2 PALPITATIONS: ICD-10-CM

## 2021-11-16 LAB
A1C WITH ESTIMATED AVERAGE GLUCOSE RESULT: 6.2 % — HIGH (ref 4–5.6)
ALBUMIN SERPL ELPH-MCNC: 3.2 G/DL — LOW (ref 3.5–5)
ALP SERPL-CCNC: 86 U/L — SIGNIFICANT CHANGE UP (ref 40–120)
ALT FLD-CCNC: 27 U/L DA — SIGNIFICANT CHANGE UP (ref 10–60)
ANION GAP SERPL CALC-SCNC: 7 MMOL/L — SIGNIFICANT CHANGE UP (ref 5–17)
AST SERPL-CCNC: 15 U/L — SIGNIFICANT CHANGE UP (ref 10–40)
BASOPHILS # BLD AUTO: 0.04 K/UL — SIGNIFICANT CHANGE UP (ref 0–0.2)
BASOPHILS NFR BLD AUTO: 0.4 % — SIGNIFICANT CHANGE UP (ref 0–2)
BILIRUB SERPL-MCNC: 0.7 MG/DL — SIGNIFICANT CHANGE UP (ref 0.2–1.2)
BUN SERPL-MCNC: 38 MG/DL — HIGH (ref 7–18)
CALCIUM SERPL-MCNC: 9.3 MG/DL — SIGNIFICANT CHANGE UP (ref 8.4–10.5)
CHLORIDE SERPL-SCNC: 106 MMOL/L — SIGNIFICANT CHANGE UP (ref 96–108)
CHOLEST SERPL-MCNC: 157 MG/DL — SIGNIFICANT CHANGE UP
CO2 SERPL-SCNC: 27 MMOL/L — SIGNIFICANT CHANGE UP (ref 22–31)
COVID-19 NUCLEOCAPSID GAM AB INTERP: NEGATIVE — SIGNIFICANT CHANGE UP
COVID-19 NUCLEOCAPSID TOTAL GAM ANTIBODY RESULT: 0.09 INDEX — SIGNIFICANT CHANGE UP
COVID-19 SPIKE DOMAIN AB INTERP: POSITIVE
COVID-19 SPIKE DOMAIN ANTIBODY RESULT: >250 U/ML — HIGH
CREAT SERPL-MCNC: 1.38 MG/DL — HIGH (ref 0.5–1.3)
EOSINOPHIL # BLD AUTO: 0.13 K/UL — SIGNIFICANT CHANGE UP (ref 0–0.5)
EOSINOPHIL NFR BLD AUTO: 1.3 % — SIGNIFICANT CHANGE UP (ref 0–6)
ESTIMATED AVERAGE GLUCOSE: 131 MG/DL — HIGH (ref 68–114)
FERRITIN SERPL-MCNC: 186 NG/ML — SIGNIFICANT CHANGE UP (ref 30–400)
GLUCOSE SERPL-MCNC: 153 MG/DL — HIGH (ref 70–99)
HCT VFR BLD CALC: 33.3 % — LOW (ref 39–50)
HDLC SERPL-MCNC: 41 MG/DL — SIGNIFICANT CHANGE UP
HGB BLD-MCNC: 11.4 G/DL — LOW (ref 13–17)
IMM GRANULOCYTES NFR BLD AUTO: 0.5 % — SIGNIFICANT CHANGE UP (ref 0–1.5)
IRON SATN MFR SERPL: 26 % — SIGNIFICANT CHANGE UP (ref 20–55)
IRON SATN MFR SERPL: 54 UG/DL — LOW (ref 65–170)
LIPID PNL WITH DIRECT LDL SERPL: 90 MG/DL — SIGNIFICANT CHANGE UP
LYMPHOCYTES # BLD AUTO: 1.45 K/UL — SIGNIFICANT CHANGE UP (ref 1–3.3)
LYMPHOCYTES # BLD AUTO: 14.2 % — SIGNIFICANT CHANGE UP (ref 13–44)
MAGNESIUM SERPL-MCNC: 1.9 MG/DL — SIGNIFICANT CHANGE UP (ref 1.6–2.6)
MCHC RBC-ENTMCNC: 32.3 PG — SIGNIFICANT CHANGE UP (ref 27–34)
MCHC RBC-ENTMCNC: 34.2 GM/DL — SIGNIFICANT CHANGE UP (ref 32–36)
MCV RBC AUTO: 94.3 FL — SIGNIFICANT CHANGE UP (ref 80–100)
MONOCYTES # BLD AUTO: 0.78 K/UL — SIGNIFICANT CHANGE UP (ref 0–0.9)
MONOCYTES NFR BLD AUTO: 7.6 % — SIGNIFICANT CHANGE UP (ref 2–14)
NEUTROPHILS # BLD AUTO: 7.77 K/UL — HIGH (ref 1.8–7.4)
NEUTROPHILS NFR BLD AUTO: 76 % — SIGNIFICANT CHANGE UP (ref 43–77)
NON HDL CHOLESTEROL: 116 MG/DL — SIGNIFICANT CHANGE UP
NRBC # BLD: 0 /100 WBCS — SIGNIFICANT CHANGE UP (ref 0–0)
PHOSPHATE SERPL-MCNC: 2.9 MG/DL — SIGNIFICANT CHANGE UP (ref 2.5–4.5)
PLATELET # BLD AUTO: 188 K/UL — SIGNIFICANT CHANGE UP (ref 150–400)
POTASSIUM SERPL-MCNC: 3.8 MMOL/L — SIGNIFICANT CHANGE UP (ref 3.5–5.3)
POTASSIUM SERPL-SCNC: 3.8 MMOL/L — SIGNIFICANT CHANGE UP (ref 3.5–5.3)
PROT SERPL-MCNC: 6.6 G/DL — SIGNIFICANT CHANGE UP (ref 6–8.3)
RBC # BLD: 3.53 M/UL — LOW (ref 4.2–5.8)
RBC # FLD: 12.8 % — SIGNIFICANT CHANGE UP (ref 10.3–14.5)
SARS-COV-2 IGG+IGM SERPL QL IA: 0.09 INDEX — SIGNIFICANT CHANGE UP
SARS-COV-2 IGG+IGM SERPL QL IA: >250 U/ML — HIGH
SARS-COV-2 IGG+IGM SERPL QL IA: NEGATIVE — SIGNIFICANT CHANGE UP
SARS-COV-2 IGG+IGM SERPL QL IA: POSITIVE
SODIUM SERPL-SCNC: 140 MMOL/L — SIGNIFICANT CHANGE UP (ref 135–145)
TIBC SERPL-MCNC: 207 UG/DL — LOW (ref 250–450)
TRIGL SERPL-MCNC: 129 MG/DL — SIGNIFICANT CHANGE UP
TSH SERPL-MCNC: 2.11 UU/ML — SIGNIFICANT CHANGE UP (ref 0.34–4.82)
UIBC SERPL-MCNC: 153 UG/DL — SIGNIFICANT CHANGE UP (ref 110–370)
WBC # BLD: 10.22 K/UL — SIGNIFICANT CHANGE UP (ref 3.8–10.5)
WBC # FLD AUTO: 10.22 K/UL — SIGNIFICANT CHANGE UP (ref 3.8–10.5)

## 2021-11-16 RX ORDER — HEPARIN SODIUM 5000 [USP'U]/ML
5000 INJECTION INTRAVENOUS; SUBCUTANEOUS EVERY 12 HOURS
Refills: 0 | Status: DISCONTINUED | OUTPATIENT
Start: 2021-11-16 | End: 2021-11-23

## 2021-11-16 RX ORDER — PANTOPRAZOLE SODIUM 20 MG/1
40 TABLET, DELAYED RELEASE ORAL
Refills: 0 | Status: DISCONTINUED | OUTPATIENT
Start: 2021-11-16 | End: 2021-11-23

## 2021-11-16 RX ADMIN — ENOXAPARIN SODIUM 80 MILLIGRAM(S): 100 INJECTION SUBCUTANEOUS at 06:32

## 2021-11-16 RX ADMIN — LOSARTAN POTASSIUM 100 MILLIGRAM(S): 100 TABLET, FILM COATED ORAL at 06:31

## 2021-11-16 RX ADMIN — Medication 2000 UNIT(S): at 12:43

## 2021-11-16 RX ADMIN — ATENOLOL 50 MILLIGRAM(S): 25 TABLET ORAL at 06:32

## 2021-11-16 RX ADMIN — Medication 1 MILLIGRAM(S): at 12:44

## 2021-11-16 RX ADMIN — HEPARIN SODIUM 5000 UNIT(S): 5000 INJECTION INTRAVENOUS; SUBCUTANEOUS at 18:30

## 2021-11-16 RX ADMIN — Medication 325 MILLIGRAM(S): at 12:44

## 2021-11-16 RX ADMIN — Medication 81 MILLIGRAM(S): at 12:43

## 2021-11-16 RX ADMIN — FINASTERIDE 5 MILLIGRAM(S): 5 TABLET, FILM COATED ORAL at 12:44

## 2021-11-16 RX ADMIN — ATORVASTATIN CALCIUM 40 MILLIGRAM(S): 80 TABLET, FILM COATED ORAL at 21:48

## 2021-11-16 RX ADMIN — Medication 112 MICROGRAM(S): at 06:32

## 2021-11-16 RX ADMIN — PANTOPRAZOLE SODIUM 40 MILLIGRAM(S): 20 TABLET, DELAYED RELEASE ORAL at 12:44

## 2021-11-16 RX ADMIN — AMLODIPINE BESYLATE 10 MILLIGRAM(S): 2.5 TABLET ORAL at 06:31

## 2021-11-16 NOTE — PROGRESS NOTE ADULT - PROBLEM SELECTOR PLAN 1
Initially suspecting Aflutter but on closer examination just tremor.   CHADSVASC: 4  Lovenox fd ac  c/w atenolol  Tele monitoring - Goal rate <110  f/u TTE  Cardio Consulted - Dr Rose

## 2021-11-16 NOTE — PHYSICAL THERAPY INITIAL EVALUATION ADULT - DIAGNOSIS, PT EVAL
Pt. presents w/ baseline of being (I), just limited w/ SOB w/ activities. Pt. scores 2 on Modified Alondra Scale.

## 2021-11-16 NOTE — CONSULT NOTE ADULT - SUBJECTIVE AND OBJECTIVE BOX
CHIEF COMPLAINT:Patient is a 89y old  Male who presents with a chief complaint of New Atrial Flutter.      HPI:  89 yr old Male with PMHX of CAD-s/p CABG,HTN,Hypothyroidism,Lipid d/o,HTN,BPH ,chronic acevedo (last changed 11/8) ,BPH from home, lives alone with rollator, independent aox3 at baseline with good insight,  presents with palpitations and feeling of shortness of breath since 11/14, intermittent. He reports never having arrhythmias in the past or history of heart failure/weak heart. Pt notes his symptoms are worse with ambulation. Pt denies chest pain, orthopnea, changes in his urine, loss of consciousness, fever, chills. His leg edema has stayed relatively the same over the past few weeks. (15 Nov 2021 17:46)      PAST MEDICAL & SURGICAL HISTORY:  HTN - Hypertension    CAD (Coronary Artery Disease)  CABG-5-6 yrs ago- [facility]    PAD (Peripheral Artery Disease)  lower legs    Hypothyroidism    Dyslipidemia    BPH (Benign Prostatic Hyperplasia)    S/P CABG (Coronary Artery Bypass Graft)  x 2 vessels -2005or 2006 [facility]            MEDICATIONS  (STANDING):  amLODIPine   Tablet 10 milliGRAM(s) Oral daily  aspirin enteric coated 81 milliGRAM(s) Oral daily  ATENolol  Tablet 50 milliGRAM(s) Oral daily  atorvastatin 40 milliGRAM(s) Oral at bedtime  cholecalciferol 2000 Unit(s) Oral daily  clopidogrel Tablet 75 milliGRAM(s) Oral daily  enoxaparin Injectable 80 milliGRAM(s) SubCutaneous every 12 hours  ferrous    sulfate 325 milliGRAM(s) Oral daily  finasteride 5 milliGRAM(s) Oral daily  folic acid 1 milliGRAM(s) Oral daily  levothyroxine 112 MICROGram(s) Oral daily  losartan 100 milliGRAM(s) Oral daily    MEDICATIONS  (PRN):      FAMILY HISTORY:No hx of CAD      SOCIAL HISTORY:    [ x] Non-smoker    [x ] Alcohol-denies    Allergies    Bactrim DS (Other)  morphine (Nausea)    Intolerances    	    REVIEW OF SYSTEMS:  CONSTITUTIONAL: No fever, weight loss, or fatigue  EYES: No eye pain, visual disturbances, or discharge  ENT:  No difficulty hearing, tinnitus, vertigo; No sinus or throat pain  NECK: No pain or stiffness  RESPIRATORY: No cough, wheezing, chills or hemoptysis; + Shortness of Breath  CARDIOVASCULAR: No chest pain,+ palpitations,No passing out, dizziness, or leg swelling  GASTROINTESTINAL: No abdominal or epigastric pain. No nausea, vomiting, or hematemesis; No diarrhea or constipation. No melena or hematochezia.  GENITOURINARY: No dysuria, frequency, hematuria, or incontinence  NEUROLOGICAL: No headaches, memory loss, loss of strength, numbness, or tremors  SKIN: No itching, burning, rashes, or lesions   LYMPH Nodes: No enlarged glands  ENDOCRINE: No heat or cold intolerance; No hair loss  MUSCULOSKELETAL: No joint pain or swelling; No muscle, back, or extremity pain  PSYCHIATRIC: No depression, anxiety, mood swings, or difficulty sleeping  HEME/LYMPH: No easy bruising, or bleeding gums  ALLERGY AND IMMUNOLOGIC: No hives or eczema	        PHYSICAL EXAM:  T(C): 36.7 (11-16-21 @ 08:00), Max: 36.8 (11-15-21 @ 15:44)  HR: 61 (11-16-21 @ 08:00) (55 - 64)  BP: 145/55 (11-16-21 @ 08:00) (134/65 - 167/62)  RR: 18 (11-16-21 @ 08:00) (18 - 20)  SpO2: 99% (11-16-21 @ 08:00) (97% - 99%)  Wt(kg): --  I&O's Summary    15 Nov 2021 07:01  -  16 Nov 2021 07:00  --------------------------------------------------------  IN: 0 mL / OUT: 1400 mL / NET: -1400 mL        Appearance: Normal	  HEENT:   Normal oral mucosa, PERRL, EOMI	  Lymphatic: No lymphadenopathy  Cardiovascular: Normal S1 S2, No JVD, No murmurs, No edema  Respiratory: Lungs clear to auscultation	  Psychiatry: A & O x 3, Mood & affect appropriate  Gastrointestinal:  Soft, Non-tender, + BS	  Skin: No rashes, No ecchymoses, No cyanosis	  Neurologic: Non-focal  Extremities: Normal range of motion, No clubbing, cyanosis or edema  Vascular: Peripheral pulses palpable 2+ bilaterally    	    ECG:  	NSR with nl axis-tremor artifact-no aflutter  	  	  LABS:	 	                       11.4   10.22 )-----------( 188      ( 16 Nov 2021 08:21 )             33.3     11-16    140  |  106  |  38<H>  ----------------------------<  153<H>  3.8   |  27  |  1.38<H>    Ca    9.3      16 Nov 2021 08:21  Phos  2.9     11-16  Mg     1.9     11-16    TPro  6.6  /  Alb  3.2<L>  /  TBili  0.7  /  DBili  x   /  AST  15  /  ALT  27  /  AlkPhos  86  11-16    proBNP: Serum Pro-Brain Natriuretic Peptide: 774 pg/mL (11-15 @ 17:00)    Lipid Profile: Cholesterol 157  LDL --  HDL 41        TSH: Thyroid Stimulating Hormone, Serum: 2.11 uU/mL (11-16 @ 08:21)      ra< from: Xray Chest 1 View AP/PA (11.15.21 @ 15:05) >  EXAM:  XR CHEST AP OR PA 1V                            PROCEDURE DATE:  11/15/2021          INTERPRETATION:  AP chest on November 15, 2021 at 2:52 PM. Patient has palpitations.    Heart size is within normal limits. Sternotomy again noted.    Lungsremain clear.    Right curve at thoracolumbar junction again noted.    Chest is similar to May 12, 2020.    IMPRESSION: No acute finding or change.    --- End of Report ---            NICOLA ROWLAND MD; Attending Radiologist  This document has been electronically signed. Nov 15 2021  4:33PM    < end of copied text >  < from: Transthoracic Echocardiogram (05.13.20 @ 07:33) >  OBSERVATIONS:  Mitral Valve: Normal mitral valve. Trace mitral  regurgitation.  Aortic Root: Normal aortic root.  Aortic Valve: Calcified  aortic valve with normal opening.  Mild aortic regurgitation.  Left Atrium: Normal left atrium.LA volume index = 32  cc/m2.  Left Ventricle: Endocardium not well visualized; grossly  mild segmental  left ventricular systolic dysfunction.  Although not all myocardial segments are well visualized,  the basal to mid anteroseptum appears hypokinetic. Normal  left ventricular internal dimensions and wall thicknesses.  Right Heart: Normal right atrium. Normal right ventricular  size and function. There is mild tricuspid regurgitation.  There is trace pulmonic regurgitation.  Pericardium/PleuraNormal pericardium with no pericardial  effusion.  Hemodynamic: RA Pressure is 8 mm Hg. RV systolic pressure  is 52 mm Hg. Moderate pulmonary hypertension.  ------------------------------------------------------------------------  CONCLUSIONS:  1. Tracemitral regurgitation.  2.  Mild aortic regurgitation.  3. Normal left ventricular internal dimensions and wall  thicknesses.  4. Endocardium not well visualized; grossly mild segmental  left ventricular systolic dysfunction.  Although not all  myocardial segments are well visualized, the basal to mid  anteroseptum appears hypokinetic.  5. Normal right ventricular size and function.  6. RV systolic pressure is 52 mm Hg. Moderate pulmonary  hypertension.    ------------------------------------------------------------------------  Confirmed on  5/13/2020 - 22:53:21 by Bill Tolentino MD

## 2021-11-16 NOTE — PHYSICAL THERAPY INITIAL EVALUATION ADULT - GENERAL OBSERVATIONS, REHAB EVAL
Patient received sitting up in bed; AxOX3, (+) Cardiac monitor, IV line and acevedo catheter. Patient reports feeling alright.

## 2021-11-17 ENCOUNTER — TRANSCRIPTION ENCOUNTER (OUTPATIENT)
Age: 86
End: 2021-11-17

## 2021-11-17 LAB
ALBUMIN SERPL ELPH-MCNC: 3.2 G/DL — LOW (ref 3.5–5)
ALP SERPL-CCNC: 94 U/L — SIGNIFICANT CHANGE UP (ref 40–120)
ALT FLD-CCNC: 26 U/L DA — SIGNIFICANT CHANGE UP (ref 10–60)
ANION GAP SERPL CALC-SCNC: 5 MMOL/L — SIGNIFICANT CHANGE UP (ref 5–17)
APPEARANCE UR: ABNORMAL
AST SERPL-CCNC: 15 U/L — SIGNIFICANT CHANGE UP (ref 10–40)
BACTERIA # UR AUTO: ABNORMAL /HPF
BILIRUB SERPL-MCNC: 0.6 MG/DL — SIGNIFICANT CHANGE UP (ref 0.2–1.2)
BILIRUB UR-MCNC: NEGATIVE — SIGNIFICANT CHANGE UP
BUN SERPL-MCNC: 39 MG/DL — HIGH (ref 7–18)
CALCIUM SERPL-MCNC: 9 MG/DL — SIGNIFICANT CHANGE UP (ref 8.4–10.5)
CHLORIDE SERPL-SCNC: 107 MMOL/L — SIGNIFICANT CHANGE UP (ref 96–108)
CO2 SERPL-SCNC: 26 MMOL/L — SIGNIFICANT CHANGE UP (ref 22–31)
COLOR SPEC: YELLOW — SIGNIFICANT CHANGE UP
CREAT ?TM UR-MCNC: 124 MG/DL — SIGNIFICANT CHANGE UP
CREAT SERPL-MCNC: 1.73 MG/DL — HIGH (ref 0.5–1.3)
DIFF PNL FLD: ABNORMAL
EPI CELLS # UR: SIGNIFICANT CHANGE UP /HPF
GLUCOSE SERPL-MCNC: 127 MG/DL — HIGH (ref 70–99)
GLUCOSE UR QL: NEGATIVE — SIGNIFICANT CHANGE UP
HCT VFR BLD CALC: 34.9 % — LOW (ref 39–50)
HGB BLD-MCNC: 11.7 G/DL — LOW (ref 13–17)
KETONES UR-MCNC: NEGATIVE — SIGNIFICANT CHANGE UP
LEUKOCYTE ESTERASE UR-ACNC: ABNORMAL
MAGNESIUM SERPL-MCNC: 2 MG/DL — SIGNIFICANT CHANGE UP (ref 1.6–2.6)
MCHC RBC-ENTMCNC: 32 PG — SIGNIFICANT CHANGE UP (ref 27–34)
MCHC RBC-ENTMCNC: 33.5 GM/DL — SIGNIFICANT CHANGE UP (ref 32–36)
MCV RBC AUTO: 95.4 FL — SIGNIFICANT CHANGE UP (ref 80–100)
NITRITE UR-MCNC: POSITIVE
NRBC # BLD: 0 /100 WBCS — SIGNIFICANT CHANGE UP (ref 0–0)
PH UR: 6 — SIGNIFICANT CHANGE UP (ref 5–8)
PHOSPHATE SERPL-MCNC: 2.9 MG/DL — SIGNIFICANT CHANGE UP (ref 2.5–4.5)
PLATELET # BLD AUTO: 213 K/UL — SIGNIFICANT CHANGE UP (ref 150–400)
POTASSIUM SERPL-MCNC: 3.9 MMOL/L — SIGNIFICANT CHANGE UP (ref 3.5–5.3)
POTASSIUM SERPL-SCNC: 3.9 MMOL/L — SIGNIFICANT CHANGE UP (ref 3.5–5.3)
PROT SERPL-MCNC: 7 G/DL — SIGNIFICANT CHANGE UP (ref 6–8.3)
PROT UR-MCNC: 30 MG/DL
RBC # BLD: 3.66 M/UL — LOW (ref 4.2–5.8)
RBC # FLD: 12.7 % — SIGNIFICANT CHANGE UP (ref 10.3–14.5)
RBC CASTS # UR COMP ASSIST: ABNORMAL /HPF (ref 0–2)
SODIUM SERPL-SCNC: 138 MMOL/L — SIGNIFICANT CHANGE UP (ref 135–145)
SP GR SPEC: 1.01 — SIGNIFICANT CHANGE UP (ref 1.01–1.02)
UROBILINOGEN FLD QL: NEGATIVE — SIGNIFICANT CHANGE UP
WBC # BLD: 8.04 K/UL — SIGNIFICANT CHANGE UP (ref 3.8–10.5)
WBC # FLD AUTO: 8.04 K/UL — SIGNIFICANT CHANGE UP (ref 3.8–10.5)
WBC UR QL: >50 /HPF (ref 0–5)

## 2021-11-17 RX ORDER — METOPROLOL TARTRATE 50 MG
12.5 TABLET ORAL
Refills: 0 | Status: DISCONTINUED | OUTPATIENT
Start: 2021-11-17 | End: 2021-11-18

## 2021-11-17 RX ORDER — CLOPIDOGREL BISULFATE 75 MG/1
75 TABLET, FILM COATED ORAL DAILY
Refills: 0 | Status: DISCONTINUED | OUTPATIENT
Start: 2021-11-17 | End: 2021-11-23

## 2021-11-17 RX ORDER — SODIUM CHLORIDE 9 MG/ML
1000 INJECTION INTRAMUSCULAR; INTRAVENOUS; SUBCUTANEOUS
Refills: 0 | Status: DISCONTINUED | OUTPATIENT
Start: 2021-11-17 | End: 2021-11-20

## 2021-11-17 RX ADMIN — HEPARIN SODIUM 5000 UNIT(S): 5000 INJECTION INTRAVENOUS; SUBCUTANEOUS at 17:29

## 2021-11-17 RX ADMIN — Medication 1 MILLIGRAM(S): at 12:02

## 2021-11-17 RX ADMIN — AMLODIPINE BESYLATE 10 MILLIGRAM(S): 2.5 TABLET ORAL at 06:12

## 2021-11-17 RX ADMIN — Medication 325 MILLIGRAM(S): at 12:02

## 2021-11-17 RX ADMIN — Medication 81 MILLIGRAM(S): at 12:03

## 2021-11-17 RX ADMIN — Medication 112 MICROGRAM(S): at 06:12

## 2021-11-17 RX ADMIN — ATORVASTATIN CALCIUM 40 MILLIGRAM(S): 80 TABLET, FILM COATED ORAL at 21:45

## 2021-11-17 RX ADMIN — LOSARTAN POTASSIUM 100 MILLIGRAM(S): 100 TABLET, FILM COATED ORAL at 06:13

## 2021-11-17 RX ADMIN — FINASTERIDE 5 MILLIGRAM(S): 5 TABLET, FILM COATED ORAL at 12:02

## 2021-11-17 RX ADMIN — Medication 2000 UNIT(S): at 12:02

## 2021-11-17 RX ADMIN — SODIUM CHLORIDE 50 MILLILITER(S): 9 INJECTION INTRAMUSCULAR; INTRAVENOUS; SUBCUTANEOUS at 21:48

## 2021-11-17 RX ADMIN — CLOPIDOGREL BISULFATE 75 MILLIGRAM(S): 75 TABLET, FILM COATED ORAL at 12:03

## 2021-11-17 RX ADMIN — Medication 12.5 MILLIGRAM(S): at 17:30

## 2021-11-17 RX ADMIN — PANTOPRAZOLE SODIUM 40 MILLIGRAM(S): 20 TABLET, DELAYED RELEASE ORAL at 06:13

## 2021-11-17 RX ADMIN — HEPARIN SODIUM 5000 UNIT(S): 5000 INJECTION INTRAVENOUS; SUBCUTANEOUS at 06:12

## 2021-11-17 NOTE — PROGRESS NOTE ADULT - PROBLEM SELECTOR PLAN 1
Initially suspecting Aflutter but on closer examination just tremor.   CHADSVASC: 4  Lovenox fd ac  c/w atenolol  Tele monitoring - Goal rate <110  - Echo wnl.  - Stress test am.   Cardio Consulted - Dr Rose

## 2021-11-17 NOTE — DISCHARGE NOTE NURSING/CASE MANAGEMENT/SOCIAL WORK - PATIENT PORTAL LINK FT
You can access the FollowMyHealth Patient Portal offered by [facility] by registering at the following website: [email or web address]. By joining [employer]’s FollowMyHealth portal, you will also be able to view your health information using other applications (apps) compatible with our system.

## 2021-11-17 NOTE — DISCHARGE NOTE NURSING/CASE MANAGEMENT/SOCIAL WORK - NSSCNAMETXT_GEN_ALL_CORE
[facility] (formerly [facility]) [phone]

## 2021-11-18 ENCOUNTER — TRANSCRIPTION ENCOUNTER (OUTPATIENT)
Age: 86
End: 2021-11-18

## 2021-11-18 DIAGNOSIS — N39.0 URINARY TRACT INFECTION, SITE NOT SPECIFIED: ICD-10-CM

## 2021-11-18 LAB
ANION GAP SERPL CALC-SCNC: 5 MMOL/L — SIGNIFICANT CHANGE UP (ref 5–17)
BUN SERPL-MCNC: 38 MG/DL — HIGH (ref 7–18)
CALCIUM SERPL-MCNC: 8.6 MG/DL — SIGNIFICANT CHANGE UP (ref 8.4–10.5)
CHLORIDE SERPL-SCNC: 106 MMOL/L — SIGNIFICANT CHANGE UP (ref 96–108)
CHLORIDE UR-SCNC: 75 MMOL/L — SIGNIFICANT CHANGE UP
CO2 SERPL-SCNC: 27 MMOL/L — SIGNIFICANT CHANGE UP (ref 22–31)
CREAT ?TM UR-MCNC: 115 MG/DL — SIGNIFICANT CHANGE UP
CREAT SERPL-MCNC: 1.88 MG/DL — HIGH (ref 0.5–1.3)
GLUCOSE SERPL-MCNC: 259 MG/DL — HIGH (ref 70–99)
PHOSPHATE 24H UR-MCNC: 41.2 MG/DL — SIGNIFICANT CHANGE UP
POTASSIUM SERPL-MCNC: 4.1 MMOL/L — SIGNIFICANT CHANGE UP (ref 3.5–5.3)
POTASSIUM SERPL-SCNC: 4.1 MMOL/L — SIGNIFICANT CHANGE UP (ref 3.5–5.3)
SODIUM SERPL-SCNC: 138 MMOL/L — SIGNIFICANT CHANGE UP (ref 135–145)
SODIUM UR-SCNC: 67 MMOL/L — SIGNIFICANT CHANGE UP

## 2021-11-18 RX ORDER — CEFTRIAXONE 500 MG/1
1000 INJECTION, POWDER, FOR SOLUTION INTRAMUSCULAR; INTRAVENOUS EVERY 24 HOURS
Refills: 0 | Status: COMPLETED | OUTPATIENT
Start: 2021-11-18 | End: 2021-11-20

## 2021-11-18 RX ORDER — METOPROLOL TARTRATE 50 MG
25 TABLET ORAL
Refills: 0 | Status: DISCONTINUED | OUTPATIENT
Start: 2021-11-18 | End: 2021-11-23

## 2021-11-18 RX ADMIN — Medication 325 MILLIGRAM(S): at 11:01

## 2021-11-18 RX ADMIN — HEPARIN SODIUM 5000 UNIT(S): 5000 INJECTION INTRAVENOUS; SUBCUTANEOUS at 05:31

## 2021-11-18 RX ADMIN — PANTOPRAZOLE SODIUM 40 MILLIGRAM(S): 20 TABLET, DELAYED RELEASE ORAL at 06:11

## 2021-11-18 RX ADMIN — FINASTERIDE 5 MILLIGRAM(S): 5 TABLET, FILM COATED ORAL at 11:01

## 2021-11-18 RX ADMIN — Medication 2000 UNIT(S): at 11:01

## 2021-11-18 RX ADMIN — HEPARIN SODIUM 5000 UNIT(S): 5000 INJECTION INTRAVENOUS; SUBCUTANEOUS at 17:02

## 2021-11-18 RX ADMIN — Medication 1 MILLIGRAM(S): at 11:01

## 2021-11-18 RX ADMIN — Medication 112 MICROGRAM(S): at 05:30

## 2021-11-18 RX ADMIN — Medication 81 MILLIGRAM(S): at 11:01

## 2021-11-18 RX ADMIN — CLOPIDOGREL BISULFATE 75 MILLIGRAM(S): 75 TABLET, FILM COATED ORAL at 11:01

## 2021-11-18 RX ADMIN — CEFTRIAXONE 100 MILLIGRAM(S): 500 INJECTION, POWDER, FOR SOLUTION INTRAMUSCULAR; INTRAVENOUS at 11:01

## 2021-11-18 RX ADMIN — ATORVASTATIN CALCIUM 40 MILLIGRAM(S): 80 TABLET, FILM COATED ORAL at 21:04

## 2021-11-18 RX ADMIN — Medication 12.5 MILLIGRAM(S): at 05:32

## 2021-11-18 NOTE — DISCHARGE NOTE PROVIDER - HOSPITAL COURSE
89F from home, lives alone with rollator, independent aox3 at baseline with good insight, with PMHx of DM, HTN, CABG, BPH w/ chronic acevedo (last changed 11/8) and BPH presents with palpitations and feeling of shortness of breath since 11/14, intermittent. He reports never having arrhythmias in the past or history of heart failure/weak heart. Pt notes his symptoms are worse with ambulation. Pt denies chest pain, orthopnea, changes in his urine, loss of consciousness, fever, chills. His leg edema has stayed relatively the same over the past few weeks.      89F from home, lives alone with rollator, independent aox3 at baseline with good insight, with PMHx of DM, HTN, CABG, BPH w/ chronic acevedo (last changed 11/8) and BPH presents with palpitations and feeling of shortness of breath since 11/14, intermittent. He reports never having arrhythmias in the past or history of heart failure/weak heart. Pt notes his symptoms are worse with ambulation. Pt denies chest pain, orthopnea, changes in his urine, loss of consciousness, fever, chills. His leg edema has stayed relatively the same over the past few weeks. Pt was evaluted by Cardiology who did a stress test which showed:    NUCLEAR FINDINGS:  Review of raw data shows: Diaphragmatic artifact.  There is a small, severe defect in basal inferior wall  that is fixed consistent with diaphragmatic attenuation  artifact.  ------------------------------------------------------------------------      GATED ANALYSIS:  Post-stress resting myocardial perfusion gated SPECT  imaging was performed (LVEF = 56 %)  ------------------------------------------------------------------------      LV/RV OBSERVATION:  Normal LV ejection fraction., No segmental wall motion  abnormality.  ------------------------------------------------------------------------    IMPRESSIONS:Normal Study  * Negative ECG evidence of ischemia after IV of Lexiscan.  * Review of raw data shows: Diaphragmatic artifact.  * There is a small, severe defect in basal inferior wall  that is fixed consistent with diaphragmatic attenuation  artifact.  * Post-stress resting myocardial perfusion gated SPECT  imaging was performed (LVEF = 56 %)    The echo showed:    . Normal mitral valve. Mild mitral regurgitation.  2. Calcified trileaflet aortic valve with normal opening.  Mild aortic regurgitation.  3. Aortic Root: 3.8 cm.  4. Normal left atrium.  LA volume index = 28 cc/m2.  5. Normal left ventricular internal dimensions and wall  thicknesses.  6. Normal Left Ventricular Systolic Function,  (EF = 55 to  60%)  7. Normal diastolic function.  8. Normal right atrium.  9. Normal right ventricular size and systolic function  (TAPSE 2.0 cm).  10. RV systolic pressure is mildly increased at  39 mm Hg.  11. There is mild tricuspid regurgitation.  12. Normal pulmonic valve.  13. Normal pericardium with no pericardial effusion.    Pt was also found to have a UTI on UA, was started on Rocephin. Finished treatment. Urine culture came back contaminated. Patient is stable for discharge. Patient has been advised to follow up as outpatient. Case has been discussed with the attending. This is just a summary of the case. For further information, please refer to patient chart document.         89F from home, lives alone with rollator, independent aox3 at baseline with good insight, with PMHx of DM, HTN, CABG, BPH w/ chronic acevedo (last changed 11/8) and BPH presents with palpitations and feeling of shortness of breath since 11/14, intermittent. He reports never having arrhythmias in the past or history of heart failure/weak heart. Pt notes his symptoms are worse with ambulation. Pt denies chest pain, orthopnea, changes in his urine, loss of consciousness, fever, chills. His leg edema has stayed relatively the same over the past few weeks. Pt was evaluted by Cardiology who did a stress test which showed:    NUCLEAR FINDINGS:  Review of raw data shows: Diaphragmatic artifact.  There is a small, severe defect in basal inferior wall  that is fixed consistent with diaphragmatic attenuation  artifact.  ------------------------------------------------------------------------      GATED ANALYSIS:  Post-stress resting myocardial perfusion gated SPECT  imaging was performed (LVEF = 56 %)  ------------------------------------------------------------------------      LV/RV OBSERVATION:  Normal LV ejection fraction., No segmental wall motion  abnormality.  ------------------------------------------------------------------------    IMPRESSIONS:Normal Study  * Negative ECG evidence of ischemia after IV of Lexiscan.  * Review of raw data shows: Diaphragmatic artifact.  * There is a small, severe defect in basal inferior wall  that is fixed consistent with diaphragmatic attenuation  artifact.  * Post-stress resting myocardial perfusion gated SPECT  imaging was performed (LVEF = 56 %)    The echo showed:    . Normal mitral valve. Mild mitral regurgitation.  2. Calcified trileaflet aortic valve with normal opening.  Mild aortic regurgitation.  3. Aortic Root: 3.8 cm.  4. Normal left atrium.  LA volume index = 28 cc/m2.  5. Normal left ventricular internal dimensions and wall  thicknesses.  6. Normal Left Ventricular Systolic Function,  (EF = 55 to  60%)  7. Normal diastolic function.  8. Normal right atrium.  9. Normal right ventricular size and systolic function  (TAPSE 2.0 cm).  10. RV systolic pressure is mildly increased at  39 mm Hg.  11. There is mild tricuspid regurgitation.  12. Normal pulmonic valve.  13. Normal pericardium with no pericardial effusion.    Pt was also found to have a acute cystitis, was started on Rocephin. Finished treatment. Urine culture came back contaminated. Patient is stable for discharge. Patient has been advised to follow up as outpatient. Case has been discussed with the attending. This is just a summary of the case. For further information, please refer to patient chart document.

## 2021-11-18 NOTE — DISCHARGE NOTE PROVIDER - NSDCCPCAREPLAN_GEN_ALL_CORE_FT
PRINCIPAL DISCHARGE DIAGNOSIS  Diagnosis: Atrial flutter  Assessment and Plan of Treatment: You were admitted for palpatations. We continued to monitor you on a cardiology floor. You did not have any more events. We saw the changes in the EKG but they are artifacts as you have tremors which caused the distortion. Please continue to follow up with your cardiologist for further workup. Please follow up with your primary care doctor within 2 weeks of discharge.        SECONDARY DISCHARGE DIAGNOSES  Diagnosis: Acute UTI  Assessment and Plan of Treatment: While in the hospital you had a urine analysis performed which came back positive. You were treated with rocephin. You finished your treatment while in the hospital. Please follow up with your primary care doctor within 2 weeks of discharge.      Diagnosis: HLD (hyperlipidemia)  Assessment and Plan of Treatment: You have hyperlipidemia. Please continue to take your cholesterol medications. Maintain a healthy diet that consist of low sugar, low fat, low sodium. Decrease the amount of fried foods that you consume. Exercise frequently if possible. Please follow up with  your primary care provider within 1 week of your discharge.  You are recommended a DASH Diet that emphasizes mostly vegetables, fruits, and fat-free or low-fat dairy products. Please limit intake of sodium, sweets, beverages w/ high sugar/carbohydrate content.      Diagnosis: HTN (hypertension)  Assessment and Plan of Treatment: You have high blood pressure. Please continue to taking your medications as prescribed. Please measure your blood pressure at least once daily. Maintain a healthy diet which includes incorporating more vegetables and decreasing the amount of salt (low sodium) and sugar in your diet such as rice, bread, and pasta low sugar, low fat, low sodium diet. Exercise frequently if possible.  Please follow up with your primary care provider within one week of your discharge.       PRINCIPAL DISCHARGE DIAGNOSIS  Diagnosis: Acute cystitis  Assessment and Plan of Treatment: While in the hospital you had a urine analysis performed which came back positive. You were treated with rocephin. You finished your treatment while in the hospital. Please follow up with your primary care doctor within 2 weeks of discharge.        SECONDARY DISCHARGE DIAGNOSES  Diagnosis: Atrial flutter  Assessment and Plan of Treatment: You were admitted for palpatations. We continued to monitor you on a cardiology floor. You did not have any more events. We saw the changes in the EKG but they are artifacts as you have tremors which caused the distortion. Please continue to follow up with your cardiologist for further workup. Please follow up with your primary care doctor within 2 weeks of discharge.      Diagnosis: HTN (hypertension)  Assessment and Plan of Treatment: You have high blood pressure. Please continue to taking your medications as prescribed. Please measure your blood pressure at least once daily. Maintain a healthy diet which includes incorporating more vegetables and decreasing the amount of salt (low sodium) and sugar in your diet such as rice, bread, and pasta low sugar, low fat, low sodium diet. Exercise frequently if possible.  Please follow up with your primary care provider within one week of your discharge.      Diagnosis: HLD (hyperlipidemia)  Assessment and Plan of Treatment: You have hyperlipidemia. Please continue to take your cholesterol medications. Maintain a healthy diet that consist of low sugar, low fat, low sodium. Decrease the amount of fried foods that you consume. Exercise frequently if possible. Please follow up with  your primary care provider within 1 week of your discharge.  You are recommended a DASH Diet that emphasizes mostly vegetables, fruits, and fat-free or low-fat dairy products. Please limit intake of sodium, sweets, beverages w/ high sugar/carbohydrate content.      Diagnosis: Acute cystitis  Assessment and Plan of Treatment: While in the hospital you had a urine analysis performed which came back positive. You were treated with rocephin. You finished your treatment while in the hospital. Please follow up with your primary care doctor within 2 weeks of discharge.       PRINCIPAL DISCHARGE DIAGNOSIS  Diagnosis: Acute cystitis  Assessment and Plan of Treatment: While in the hospital you had a urine analysis performed which came back positive. You were treated with rocephin. You finished your treatment while in the hospital. Please follow up with your primary care doctor within 2 weeks of discharge.        SECONDARY DISCHARGE DIAGNOSES  Diagnosis: HTN (hypertension)  Assessment and Plan of Treatment: You have high blood pressure. Losartan was stopped because of worsening kidney function. You were started on hydralazine and metoprolol. Please measure your blood pressure at least once daily. Maintain a healthy diet which includes incorporating more vegetables and decreasing the amount of salt (low sodium) and sugar in your diet such as rice, bread, and pasta low sugar, low fat, low sodium diet. Exercise as tolerated.  Please follow up with your primary care provider within one week of your discharge.      Diagnosis: HLD (hyperlipidemia)  Assessment and Plan of Treatment: You have hyperlipidemia. Please continue to take your cholesterol medications. Maintain a healthy diet that consist of low sugar, low fat, low sodium. Decrease the amount of fried foods that you consume. Exercise frequently if possible. Please follow up with  your primary care provider within 1 week of your discharge.  You are recommended a DASH Diet that emphasizes mostly vegetables, fruits, and fat-free or low-fat dairy products. Please limit intake of sodium, sweets, beverages w/ high sugar/carbohydrate content.      Diagnosis: Acute cystitis  Assessment and Plan of Treatment: While in the hospital you had a urine analysis performed which came back positive. You were treated with rocephin. You finished your treatment while in the hospital. Please follow up with your primary care doctor within 2 weeks of discharge.

## 2021-11-18 NOTE — DISCHARGE NOTE PROVIDER - CARE PROVIDER_API CALL
Antonio Raza)  Internal Medicine  [phone] [place] [address]  Phone: [phone]  Fax: [phone]  Follow Up Time:     KANDACE HSIEH  Family Medicine  N  Jenn ALVAREZ,    Phone: ()-  Fax: ()-  Follow Up Time:

## 2021-11-18 NOTE — CONSULT NOTE ADULT - SUBJECTIVE AND OBJECTIVE BOX
[facility] NEPHROLOGY- CONSULTATION NOTE    Patient is a 89yo Male with DM, HTN, CABG, BPH w/ chronic acevedo (last changed ), PVD s/p RLE stent presents with palpitations with SOB a/w new onset Aflutter. Nephrology consulted for Elevated serum creatinine.      Pt denies any h/o kidney disease. Patient denies any NSAID use, recent CT with contrast, or h/o hepatitis + blood transfusions.  Pt denies any SOB at rest but c/o HILL and palpitations when exerting. Pt c/o diarrhea; once a day for the past 2 days. Pt denies any n/v, abd pain or LR edema       PAST MEDICAL & SURGICAL HISTORY:  HTN - Hypertension    CAD (Coronary Artery Disease)  CABG-5-6 yrs ago- [facility]    PAD (Peripheral Artery Disease)  lower legs    Hypothyroidism    Dyslipidemia    BPH (Benign Prostatic Hyperplasia)    S/P CABG (Coronary Artery Bypass Graft)  x 2 vessels -or  [facility]    HTN - Hypertension    S/P CABG (Coronary Artery Bypass Graft)  x 2 vessels  [facility]      Bactrim DS (Other)  morphine (Nausea)    Home Medications Reviewed  Hospital Medications:   MEDICATIONS  (STANDING):  aspirin enteric coated 81 milliGRAM(s) Oral daily  atorvastatin 40 milliGRAM(s) Oral at bedtime  cefTRIAXone   IVPB 1000 milliGRAM(s) IV Intermittent every 24 hours  cholecalciferol 2000 Unit(s) Oral daily  clopidogrel Tablet 75 milliGRAM(s) Oral daily  ferrous    sulfate 325 milliGRAM(s) Oral daily  finasteride 5 milliGRAM(s) Oral daily  folic acid 1 milliGRAM(s) Oral daily  heparin   Injectable 5000 Unit(s) SubCutaneous every 12 hours  levothyroxine 112 MICROGram(s) Oral daily  metoprolol tartrate 25 milliGRAM(s) Oral two times a day  pantoprazole    Tablet 40 milliGRAM(s) Oral before breakfast  sodium chloride 0.9%. 1000 milliLiter(s) (50 mL/Hr) IV Continuous <Continuous>    SOCIAL HISTORY:    FAMILY HISTORY:      REVIEW OF SYSTEMS:  Gen: no changes in weight  HEENT: no rhinorrhea  Neck: no sore throat  Cards: no chest pain +palpitations  Resp: +dyspnea on exertion  GI: no nausea or vomiting or diarrhea  : no dysuria or hematuria  Vascular: no LE edema  Derm: no rashes  Neuro: no numbness/tingling  All other review of systems is negative unless indicated above.    VITALS:  T(F): 97.8 (21 @ 12:06), Max: 98 (21 @ 05:11)  HR: 60 (21 @ 12:06)  BP: 97/59 (21 @ 12:06)  RR: 18 (21 @ 12:06)  SpO2: 99% (21 @ 12:06)  Wt(kg): --     @ 07:01  -   @ 07:00  --------------------------------------------------------  IN: 0 mL / OUT: 1480 mL / NET: -1480 mL        PHYSICAL EXAM:  Gen: NAD, calm  HEENT: MMM  Neck: no JVD  Cards: RRR, +S1/S2,   Resp: CTA B/L  GI: soft, NT/ND, NABS  : +acevedo with pus in tubing  Extremities: no LE edema B/L  Derm: no rashes  Neuro: non-focal    LABS:      138  |  106  |  38<H>  ----------------------------<  259<H>  4.1   |  27  |  1.88<H>    Ca    8.6      2021 12:44  Phos  2.9       Mg     2.0         TPro  7.0  /  Alb  3.2<L>  /  TBili  0.6  /  DBili      /  AST  15  /  ALT  26  /  AlkPhos  94      Creatinine Trend: 1.88 <--, 1.73 <--, 1.38 <--, 1.57 <--                        11.7   8.04  )-----------( 213      ( 2021 08:18 )             34.9     Urine Studies:  Urinalysis Basic - ( 2021 21:09 )    Color: Yellow / Appearance: very cloudy / S.010 / pH:   Gluc:  / Ketone: Negative  / Bili: Negative / Urobili: Negative   Blood:  / Protein: 30 mg/dL / Nitrite: Positive   Leuk Esterase: Moderate / RBC: 2-5 /HPF / WBC >50 /HPF   Sq Epi:  / Non Sq Epi: Few /HPF / Bacteria: Many /HPF      Creatinine, Random Urine: 124 mg/dL ( @ 21:09)    RADIOLOGY & ADDITIONAL STUDIES:    < from: Xray Chest 1 View AP/PA (11.15.21 @ 15:05) >    EXAM:  XR CHEST AP OR PA 1V                            PROCEDURE DATE:  11/15/2021          INTERPRETATION:  AP chest on November 15, 2021 at 2:52 PM. Patient has palpitations.    Heart size is within normal limits. Sternotomy again noted.    Lungsremain clear.    Right curve at thoracolumbar junction again noted.    Chest is similar to May 12, 2020.    IMPRESSION: No acute finding or change.    --- End of Report ---    < end of copied text >

## 2021-11-18 NOTE — DISCHARGE NOTE PROVIDER - NSDCMRMEDTOKEN_GEN_ALL_CORE_FT
amLODIPine 10 mg oral tablet: 1 tab(s) orally once a day  aspirin 81 mg oral delayed release tablet: 1 tab(s) orally once a day  atenolol 50 mg oral tablet: 1 tab(s) orally once a day  atorvastatin 40 mg oral tablet: 1 tab(s) orally once a day  chlorthalidone 25 mg oral tablet: 1 tab(s) orally once a day  cholecalciferol 1000 intl units (25 mcg) oral tablet: 2 tab(s) orally once a day  clopidogrel 75 mg oral tablet: 1 tab(s) orally once a day  ferrous sulfate 325 mg (65 mg elemental iron) oral tablet: 1 tab(s) orally once a day  finasteride 5 mg oral tablet: 1 tab(s) orally once a day  folic acid 1 mg oral tablet: 1 tab(s) orally once a day  levothyroxine 112 mcg (0.112 mg) oral capsule: 1 cap(s) orally once a day  losartan 100 mg oral tablet: 1 tab(s) orally once a day   aspirin 81 mg oral delayed release tablet: 1 tab(s) orally once a day  atorvastatin 40 mg oral tablet: 1 tab(s) orally once a day  cholecalciferol 1000 intl units (25 mcg) oral tablet: 2 tab(s) orally once a day  clopidogrel 75 mg oral tablet: 1 tab(s) orally once a day  ferrous sulfate 325 mg (65 mg elemental iron) oral tablet: 1 tab(s) orally once a day  finasteride 5 mg oral tablet: 1 tab(s) orally once a day  folic acid 1 mg oral tablet: 1 tab(s) orally once a day  levothyroxine 112 mcg (0.112 mg) oral capsule: 1 cap(s) orally once a day  metoprolol tartrate 25 mg oral tablet: 1 tab(s) orally 2 times a day   aspirin 81 mg oral delayed release tablet: 1 tab(s) orally once a day  atorvastatin 40 mg oral tablet: 1 tab(s) orally once a day  cholecalciferol 1000 intl units (25 mcg) oral tablet: 2 tab(s) orally once a day  clopidogrel 75 mg oral tablet: 1 tab(s) orally once a day  ferrous sulfate 325 mg (65 mg elemental iron) oral tablet: 1 tab(s) orally once a day  finasteride 5 mg oral tablet: 1 tab(s) orally once a day  folic acid 1 mg oral tablet: 1 tab(s) orally once a day  hydrALAZINE 25 mg oral tablet: 1 tab(s) orally every 8 hours  levothyroxine 112 mcg (0.112 mg) oral capsule: 1 cap(s) orally once a day  metoprolol tartrate 25 mg oral tablet: 1 tab(s) orally 2 times a day

## 2021-11-18 NOTE — CONSULT NOTE ADULT - ASSESSMENT
Patient is a 89yo Male with DM, HTN, CABG, BPH w/ chronic acevedo (last changed 11/8), PVD s/p RLE stent presents with palpitations with SOB a/w new onset Aflutter. Nephrology consulted for Elevated serum creatinine.    1. TERI- in the setting of infection; Active UA in the setting of UTI. Losartan held due to TERI. Check urine lytes and Renal US. Strict I/Os. Avoid nephrotoxins/ NSAIDs/ RCA. Monitor BMP.  2. CKD-3b- previous SCr 1.4-1.7 in 2020. Defer secondary w/u at this time. Avoid nephrotoxins.   3. HTN 2/2 CKD- BP low normal. Losartan held due to TERI. Recc to decrease metoprolol to 12.5mg PO bid. Monitor BP  4. BPH- with chronic acevedo. c/w Finasteride. Recc to change acevedo  5. Acute cystitis- +UA, UCx pending. c/w Ceftriaxone      [facility] NEPHROLOGY  Nathaniel Morrell M.D.  Shoaib Villa D.O.  Emily Latham M.D.  Jennifer Mackay, MSN, ANP-C  [phone]    [address] [place] [address]  
89 yr old Male with PMHX of CAD-s/p CABG,HTN,Hypothyroidism,Lipid d/o,BPH ,chronic acevedo (last changed 11/8) ,BPH from home, lives alone with rollator, independent aox3 at baseline with good insight,  presents with palpitations and feeling of shortness of breath.  1.Tele monitoring.  2.Echocardiogram.  3.No afib/flutter seen.  4.CAD-asa,b blocker,statin.  5.HTN-cont bp medication.  6.Hypothyroidism-synthroid.  7.GI and DVT prophylaxis.

## 2021-11-19 DIAGNOSIS — N17.9 ACUTE KIDNEY FAILURE, UNSPECIFIED: ICD-10-CM

## 2021-11-19 LAB
ALBUMIN SERPL ELPH-MCNC: 2.6 G/DL — LOW (ref 3.5–5)
ALP SERPL-CCNC: 81 U/L — SIGNIFICANT CHANGE UP (ref 40–120)
ALT FLD-CCNC: 24 U/L DA — SIGNIFICANT CHANGE UP (ref 10–60)
ANION GAP SERPL CALC-SCNC: 5 MMOL/L — SIGNIFICANT CHANGE UP (ref 5–17)
AST SERPL-CCNC: 14 U/L — SIGNIFICANT CHANGE UP (ref 10–40)
BILIRUB SERPL-MCNC: 0.3 MG/DL — SIGNIFICANT CHANGE UP (ref 0.2–1.2)
BUN SERPL-MCNC: 35 MG/DL — HIGH (ref 7–18)
CALCIUM SERPL-MCNC: 8.5 MG/DL — SIGNIFICANT CHANGE UP (ref 8.4–10.5)
CHLORIDE SERPL-SCNC: 109 MMOL/L — HIGH (ref 96–108)
CO2 SERPL-SCNC: 26 MMOL/L — SIGNIFICANT CHANGE UP (ref 22–31)
CREAT SERPL-MCNC: 1.76 MG/DL — HIGH (ref 0.5–1.3)
GLUCOSE SERPL-MCNC: 144 MG/DL — HIGH (ref 70–99)
HCT VFR BLD CALC: 32.1 % — LOW (ref 39–50)
HGB BLD-MCNC: 11 G/DL — LOW (ref 13–17)
MAGNESIUM SERPL-MCNC: 1.9 MG/DL — SIGNIFICANT CHANGE UP (ref 1.6–2.6)
MCHC RBC-ENTMCNC: 32.8 PG — SIGNIFICANT CHANGE UP (ref 27–34)
MCHC RBC-ENTMCNC: 34.3 GM/DL — SIGNIFICANT CHANGE UP (ref 32–36)
MCV RBC AUTO: 95.8 FL — SIGNIFICANT CHANGE UP (ref 80–100)
NRBC # BLD: 0 /100 WBCS — SIGNIFICANT CHANGE UP (ref 0–0)
PHOSPHATE SERPL-MCNC: 2.6 MG/DL — SIGNIFICANT CHANGE UP (ref 2.5–4.5)
PLATELET # BLD AUTO: 168 K/UL — SIGNIFICANT CHANGE UP (ref 150–400)
POTASSIUM SERPL-MCNC: 4.3 MMOL/L — SIGNIFICANT CHANGE UP (ref 3.5–5.3)
POTASSIUM SERPL-SCNC: 4.3 MMOL/L — SIGNIFICANT CHANGE UP (ref 3.5–5.3)
PROT SERPL-MCNC: 6.1 G/DL — SIGNIFICANT CHANGE UP (ref 6–8.3)
RBC # BLD: 3.35 M/UL — LOW (ref 4.2–5.8)
RBC # FLD: 12.6 % — SIGNIFICANT CHANGE UP (ref 10.3–14.5)
SODIUM SERPL-SCNC: 140 MMOL/L — SIGNIFICANT CHANGE UP (ref 135–145)
UUN UR-MCNC: <3 MG/DL — SIGNIFICANT CHANGE UP
WBC # BLD: 6.33 K/UL — SIGNIFICANT CHANGE UP (ref 3.8–10.5)
WBC # FLD AUTO: 6.33 K/UL — SIGNIFICANT CHANGE UP (ref 3.8–10.5)

## 2021-11-19 RX ADMIN — Medication 25 MILLIGRAM(S): at 18:02

## 2021-11-19 RX ADMIN — SODIUM CHLORIDE 50 MILLILITER(S): 9 INJECTION INTRAMUSCULAR; INTRAVENOUS; SUBCUTANEOUS at 22:06

## 2021-11-19 RX ADMIN — Medication 112 MICROGRAM(S): at 05:06

## 2021-11-19 RX ADMIN — Medication 325 MILLIGRAM(S): at 11:19

## 2021-11-19 RX ADMIN — CLOPIDOGREL BISULFATE 75 MILLIGRAM(S): 75 TABLET, FILM COATED ORAL at 11:19

## 2021-11-19 RX ADMIN — Medication 2000 UNIT(S): at 11:19

## 2021-11-19 RX ADMIN — FINASTERIDE 5 MILLIGRAM(S): 5 TABLET, FILM COATED ORAL at 11:19

## 2021-11-19 RX ADMIN — ATORVASTATIN CALCIUM 40 MILLIGRAM(S): 80 TABLET, FILM COATED ORAL at 22:06

## 2021-11-19 RX ADMIN — Medication 25 MILLIGRAM(S): at 05:06

## 2021-11-19 RX ADMIN — Medication 81 MILLIGRAM(S): at 11:19

## 2021-11-19 RX ADMIN — HEPARIN SODIUM 5000 UNIT(S): 5000 INJECTION INTRAVENOUS; SUBCUTANEOUS at 05:06

## 2021-11-19 RX ADMIN — PANTOPRAZOLE SODIUM 40 MILLIGRAM(S): 20 TABLET, DELAYED RELEASE ORAL at 05:06

## 2021-11-19 RX ADMIN — Medication 1 MILLIGRAM(S): at 11:19

## 2021-11-19 RX ADMIN — CEFTRIAXONE 100 MILLIGRAM(S): 500 INJECTION, POWDER, FOR SOLUTION INTRAMUSCULAR; INTRAVENOUS at 10:39

## 2021-11-19 RX ADMIN — HEPARIN SODIUM 5000 UNIT(S): 5000 INJECTION INTRAVENOUS; SUBCUTANEOUS at 18:02

## 2021-11-19 NOTE — PROGRESS NOTE ADULT - PROBLEM SELECTOR PLAN 5
on 112mcg synthroid at home  c/w home med - Cr jumped from 1.57 to 1.88.   - Starting to trend down.   - Continue to monitor.   -

## 2021-11-19 NOTE — PROGRESS NOTE ADULT - PROBLEM SELECTOR PLAN 1
UA +  - Started pt on Rocephin.   - Continue to monitor.   - Follow up UCx. UA +  - Started pt on Rocephin.   - Continue to monitor.   - Follow up UCx.  - With chronic acevedo possibly ESBL will wait for culture.

## 2021-11-20 LAB
ALBUMIN SERPL ELPH-MCNC: 2.6 G/DL — LOW (ref 3.5–5)
ALP SERPL-CCNC: 87 U/L — SIGNIFICANT CHANGE UP (ref 40–120)
ALT FLD-CCNC: 27 U/L DA — SIGNIFICANT CHANGE UP (ref 10–60)
ANION GAP SERPL CALC-SCNC: 3 MMOL/L — LOW (ref 5–17)
AST SERPL-CCNC: 17 U/L — SIGNIFICANT CHANGE UP (ref 10–40)
BILIRUB SERPL-MCNC: 0.3 MG/DL — SIGNIFICANT CHANGE UP (ref 0.2–1.2)
BUN SERPL-MCNC: 27 MG/DL — HIGH (ref 7–18)
CALCIUM SERPL-MCNC: 8.6 MG/DL — SIGNIFICANT CHANGE UP (ref 8.4–10.5)
CHLORIDE SERPL-SCNC: 110 MMOL/L — HIGH (ref 96–108)
CO2 SERPL-SCNC: 26 MMOL/L — SIGNIFICANT CHANGE UP (ref 22–31)
CREAT SERPL-MCNC: 1.57 MG/DL — HIGH (ref 0.5–1.3)
CULTURE RESULTS: SIGNIFICANT CHANGE UP
GLUCOSE SERPL-MCNC: 132 MG/DL — HIGH (ref 70–99)
HCT VFR BLD CALC: 32.2 % — LOW (ref 39–50)
HGB BLD-MCNC: 11.1 G/DL — LOW (ref 13–17)
MAGNESIUM SERPL-MCNC: 2 MG/DL — SIGNIFICANT CHANGE UP (ref 1.6–2.6)
MCHC RBC-ENTMCNC: 32.9 PG — SIGNIFICANT CHANGE UP (ref 27–34)
MCHC RBC-ENTMCNC: 34.5 GM/DL — SIGNIFICANT CHANGE UP (ref 32–36)
MCV RBC AUTO: 95.5 FL — SIGNIFICANT CHANGE UP (ref 80–100)
NRBC # BLD: 0 /100 WBCS — SIGNIFICANT CHANGE UP (ref 0–0)
PHOSPHATE SERPL-MCNC: 2.6 MG/DL — SIGNIFICANT CHANGE UP (ref 2.5–4.5)
PLATELET # BLD AUTO: 189 K/UL — SIGNIFICANT CHANGE UP (ref 150–400)
POTASSIUM SERPL-MCNC: 4.1 MMOL/L — SIGNIFICANT CHANGE UP (ref 3.5–5.3)
POTASSIUM SERPL-SCNC: 4.1 MMOL/L — SIGNIFICANT CHANGE UP (ref 3.5–5.3)
PROT SERPL-MCNC: 6.2 G/DL — SIGNIFICANT CHANGE UP (ref 6–8.3)
RBC # BLD: 3.37 M/UL — LOW (ref 4.2–5.8)
RBC # FLD: 12.8 % — SIGNIFICANT CHANGE UP (ref 10.3–14.5)
SODIUM SERPL-SCNC: 139 MMOL/L — SIGNIFICANT CHANGE UP (ref 135–145)
SPECIMEN SOURCE: SIGNIFICANT CHANGE UP
WBC # BLD: 7.27 K/UL — SIGNIFICANT CHANGE UP (ref 3.8–10.5)
WBC # FLD AUTO: 7.27 K/UL — SIGNIFICANT CHANGE UP (ref 3.8–10.5)

## 2021-11-20 RX ORDER — HYDRALAZINE HCL 50 MG
5 TABLET ORAL ONCE
Refills: 0 | Status: COMPLETED | OUTPATIENT
Start: 2021-11-20 | End: 2021-11-20

## 2021-11-20 RX ORDER — HYDRALAZINE HCL 50 MG
25 TABLET ORAL EVERY 8 HOURS
Refills: 0 | Status: DISCONTINUED | OUTPATIENT
Start: 2021-11-20 | End: 2021-11-23

## 2021-11-20 RX ORDER — SODIUM CHLORIDE 9 MG/ML
1000 INJECTION INTRAMUSCULAR; INTRAVENOUS; SUBCUTANEOUS
Refills: 0 | Status: DISCONTINUED | OUTPATIENT
Start: 2021-11-20 | End: 2021-11-22

## 2021-11-20 RX ADMIN — ATORVASTATIN CALCIUM 40 MILLIGRAM(S): 80 TABLET, FILM COATED ORAL at 21:15

## 2021-11-20 RX ADMIN — Medication 25 MILLIGRAM(S): at 05:24

## 2021-11-20 RX ADMIN — Medication 112 MICROGRAM(S): at 05:24

## 2021-11-20 RX ADMIN — Medication 25 MILLIGRAM(S): at 21:15

## 2021-11-20 RX ADMIN — Medication 5 MILLIGRAM(S): at 02:40

## 2021-11-20 RX ADMIN — Medication 25 MILLIGRAM(S): at 17:31

## 2021-11-20 RX ADMIN — Medication 325 MILLIGRAM(S): at 12:08

## 2021-11-20 RX ADMIN — CEFTRIAXONE 100 MILLIGRAM(S): 500 INJECTION, POWDER, FOR SOLUTION INTRAMUSCULAR; INTRAVENOUS at 12:07

## 2021-11-20 RX ADMIN — PANTOPRAZOLE SODIUM 40 MILLIGRAM(S): 20 TABLET, DELAYED RELEASE ORAL at 05:24

## 2021-11-20 RX ADMIN — SODIUM CHLORIDE 80 MILLILITER(S): 9 INJECTION INTRAMUSCULAR; INTRAVENOUS; SUBCUTANEOUS at 12:18

## 2021-11-20 RX ADMIN — HEPARIN SODIUM 5000 UNIT(S): 5000 INJECTION INTRAVENOUS; SUBCUTANEOUS at 05:24

## 2021-11-20 RX ADMIN — SODIUM CHLORIDE 80 MILLILITER(S): 9 INJECTION INTRAMUSCULAR; INTRAVENOUS; SUBCUTANEOUS at 21:15

## 2021-11-20 RX ADMIN — FINASTERIDE 5 MILLIGRAM(S): 5 TABLET, FILM COATED ORAL at 12:08

## 2021-11-20 RX ADMIN — Medication 2000 UNIT(S): at 12:06

## 2021-11-20 RX ADMIN — CLOPIDOGREL BISULFATE 75 MILLIGRAM(S): 75 TABLET, FILM COATED ORAL at 12:07

## 2021-11-20 RX ADMIN — Medication 81 MILLIGRAM(S): at 12:07

## 2021-11-20 RX ADMIN — HEPARIN SODIUM 5000 UNIT(S): 5000 INJECTION INTRAVENOUS; SUBCUTANEOUS at 17:30

## 2021-11-20 RX ADMIN — Medication 25 MILLIGRAM(S): at 13:22

## 2021-11-20 RX ADMIN — Medication 1 MILLIGRAM(S): at 12:07

## 2021-11-21 LAB
ALBUMIN SERPL ELPH-MCNC: 2.5 G/DL — LOW (ref 3.5–5)
ALP SERPL-CCNC: 79 U/L — SIGNIFICANT CHANGE UP (ref 40–120)
ALT FLD-CCNC: 28 U/L DA — SIGNIFICANT CHANGE UP (ref 10–60)
ANION GAP SERPL CALC-SCNC: 6 MMOL/L — SIGNIFICANT CHANGE UP (ref 5–17)
AST SERPL-CCNC: 17 U/L — SIGNIFICANT CHANGE UP (ref 10–40)
BILIRUB SERPL-MCNC: 0.3 MG/DL — SIGNIFICANT CHANGE UP (ref 0.2–1.2)
BUN SERPL-MCNC: 28 MG/DL — HIGH (ref 7–18)
CALCIUM SERPL-MCNC: 8.7 MG/DL — SIGNIFICANT CHANGE UP (ref 8.4–10.5)
CHLORIDE SERPL-SCNC: 109 MMOL/L — HIGH (ref 96–108)
CO2 SERPL-SCNC: 25 MMOL/L — SIGNIFICANT CHANGE UP (ref 22–31)
CREAT SERPL-MCNC: 1.53 MG/DL — HIGH (ref 0.5–1.3)
GLUCOSE SERPL-MCNC: 141 MG/DL — HIGH (ref 70–99)
HCT VFR BLD CALC: 32.6 % — LOW (ref 39–50)
HGB BLD-MCNC: 11 G/DL — LOW (ref 13–17)
MAGNESIUM SERPL-MCNC: 1.9 MG/DL — SIGNIFICANT CHANGE UP (ref 1.6–2.6)
MCHC RBC-ENTMCNC: 32.1 PG — SIGNIFICANT CHANGE UP (ref 27–34)
MCHC RBC-ENTMCNC: 33.7 GM/DL — SIGNIFICANT CHANGE UP (ref 32–36)
MCV RBC AUTO: 95 FL — SIGNIFICANT CHANGE UP (ref 80–100)
NRBC # BLD: 0 /100 WBCS — SIGNIFICANT CHANGE UP (ref 0–0)
PHOSPHATE SERPL-MCNC: 2.5 MG/DL — SIGNIFICANT CHANGE UP (ref 2.5–4.5)
PLATELET # BLD AUTO: 187 K/UL — SIGNIFICANT CHANGE UP (ref 150–400)
POTASSIUM SERPL-MCNC: 4.1 MMOL/L — SIGNIFICANT CHANGE UP (ref 3.5–5.3)
POTASSIUM SERPL-SCNC: 4.1 MMOL/L — SIGNIFICANT CHANGE UP (ref 3.5–5.3)
PROT SERPL-MCNC: 5.9 G/DL — LOW (ref 6–8.3)
RBC # BLD: 3.43 M/UL — LOW (ref 4.2–5.8)
RBC # FLD: 12.6 % — SIGNIFICANT CHANGE UP (ref 10.3–14.5)
SODIUM SERPL-SCNC: 140 MMOL/L — SIGNIFICANT CHANGE UP (ref 135–145)
WBC # BLD: 6.98 K/UL — SIGNIFICANT CHANGE UP (ref 3.8–10.5)
WBC # FLD AUTO: 6.98 K/UL — SIGNIFICANT CHANGE UP (ref 3.8–10.5)

## 2021-11-21 RX ADMIN — Medication 25 MILLIGRAM(S): at 05:45

## 2021-11-21 RX ADMIN — Medication 25 MILLIGRAM(S): at 21:33

## 2021-11-21 RX ADMIN — HEPARIN SODIUM 5000 UNIT(S): 5000 INJECTION INTRAVENOUS; SUBCUTANEOUS at 05:45

## 2021-11-21 RX ADMIN — HEPARIN SODIUM 5000 UNIT(S): 5000 INJECTION INTRAVENOUS; SUBCUTANEOUS at 18:11

## 2021-11-21 RX ADMIN — FINASTERIDE 5 MILLIGRAM(S): 5 TABLET, FILM COATED ORAL at 12:46

## 2021-11-21 RX ADMIN — Medication 1 MILLIGRAM(S): at 12:46

## 2021-11-21 RX ADMIN — Medication 81 MILLIGRAM(S): at 12:46

## 2021-11-21 RX ADMIN — ATORVASTATIN CALCIUM 40 MILLIGRAM(S): 80 TABLET, FILM COATED ORAL at 21:33

## 2021-11-21 RX ADMIN — Medication 2000 UNIT(S): at 12:46

## 2021-11-21 RX ADMIN — Medication 25 MILLIGRAM(S): at 18:12

## 2021-11-21 RX ADMIN — CLOPIDOGREL BISULFATE 75 MILLIGRAM(S): 75 TABLET, FILM COATED ORAL at 12:46

## 2021-11-21 RX ADMIN — Medication 112 MICROGRAM(S): at 05:45

## 2021-11-21 RX ADMIN — SODIUM CHLORIDE 80 MILLILITER(S): 9 INJECTION INTRAMUSCULAR; INTRAVENOUS; SUBCUTANEOUS at 05:47

## 2021-11-21 RX ADMIN — Medication 25 MILLIGRAM(S): at 16:05

## 2021-11-21 RX ADMIN — Medication 325 MILLIGRAM(S): at 12:46

## 2021-11-21 RX ADMIN — PANTOPRAZOLE SODIUM 40 MILLIGRAM(S): 20 TABLET, DELAYED RELEASE ORAL at 05:45

## 2021-11-22 DIAGNOSIS — Z02.9 ENCOUNTER FOR ADMINISTRATIVE EXAMINATIONS, UNSPECIFIED: ICD-10-CM

## 2021-11-22 LAB
ALBUMIN SERPL ELPH-MCNC: 2.5 G/DL — LOW (ref 3.5–5)
ALP SERPL-CCNC: 80 U/L — SIGNIFICANT CHANGE UP (ref 40–120)
ALT FLD-CCNC: 35 U/L DA — SIGNIFICANT CHANGE UP (ref 10–60)
ANION GAP SERPL CALC-SCNC: 6 MMOL/L — SIGNIFICANT CHANGE UP (ref 5–17)
AST SERPL-CCNC: 20 U/L — SIGNIFICANT CHANGE UP (ref 10–40)
BILIRUB SERPL-MCNC: 0.3 MG/DL — SIGNIFICANT CHANGE UP (ref 0.2–1.2)
BUN SERPL-MCNC: 34 MG/DL — HIGH (ref 7–18)
CALCIUM SERPL-MCNC: 8.9 MG/DL — SIGNIFICANT CHANGE UP (ref 8.4–10.5)
CHLORIDE SERPL-SCNC: 108 MMOL/L — SIGNIFICANT CHANGE UP (ref 96–108)
CO2 SERPL-SCNC: 26 MMOL/L — SIGNIFICANT CHANGE UP (ref 22–31)
CREAT SERPL-MCNC: 1.64 MG/DL — HIGH (ref 0.5–1.3)
GLUCOSE SERPL-MCNC: 118 MG/DL — HIGH (ref 70–99)
HCT VFR BLD CALC: 32.4 % — LOW (ref 39–50)
HGB BLD-MCNC: 10.8 G/DL — LOW (ref 13–17)
MAGNESIUM SERPL-MCNC: 1.9 MG/DL — SIGNIFICANT CHANGE UP (ref 1.6–2.6)
MCHC RBC-ENTMCNC: 32 PG — SIGNIFICANT CHANGE UP (ref 27–34)
MCHC RBC-ENTMCNC: 33.3 GM/DL — SIGNIFICANT CHANGE UP (ref 32–36)
MCV RBC AUTO: 95.9 FL — SIGNIFICANT CHANGE UP (ref 80–100)
NRBC # BLD: 0 /100 WBCS — SIGNIFICANT CHANGE UP (ref 0–0)
PHOSPHATE SERPL-MCNC: 3 MG/DL — SIGNIFICANT CHANGE UP (ref 2.5–4.5)
PLATELET # BLD AUTO: 185 K/UL — SIGNIFICANT CHANGE UP (ref 150–400)
POTASSIUM SERPL-MCNC: 4.1 MMOL/L — SIGNIFICANT CHANGE UP (ref 3.5–5.3)
POTASSIUM SERPL-SCNC: 4.1 MMOL/L — SIGNIFICANT CHANGE UP (ref 3.5–5.3)
PROT SERPL-MCNC: 5.9 G/DL — LOW (ref 6–8.3)
RBC # BLD: 3.38 M/UL — LOW (ref 4.2–5.8)
RBC # FLD: 12.8 % — SIGNIFICANT CHANGE UP (ref 10.3–14.5)
SARS-COV-2 RNA SPEC QL NAA+PROBE: SIGNIFICANT CHANGE UP
SODIUM SERPL-SCNC: 140 MMOL/L — SIGNIFICANT CHANGE UP (ref 135–145)
WBC # BLD: 7.53 K/UL — SIGNIFICANT CHANGE UP (ref 3.8–10.5)
WBC # FLD AUTO: 7.53 K/UL — SIGNIFICANT CHANGE UP (ref 3.8–10.5)

## 2021-11-22 PROCEDURE — 76770 US EXAM ABDO BACK WALL COMP: CPT | Mod: 26

## 2021-11-22 RX ORDER — ATENOLOL 25 MG/1
1 TABLET ORAL
Qty: 0 | Refills: 0 | DISCHARGE

## 2021-11-22 RX ORDER — METOPROLOL TARTRATE 50 MG
1 TABLET ORAL
Qty: 60 | Refills: 0
Start: 2021-11-22 | End: 2021-12-21

## 2021-11-22 RX ORDER — CHLORTHALIDONE 50 MG
1 TABLET ORAL
Qty: 0 | Refills: 0 | DISCHARGE

## 2021-11-22 RX ORDER — AMLODIPINE BESYLATE 2.5 MG/1
1 TABLET ORAL
Qty: 0 | Refills: 0 | DISCHARGE

## 2021-11-22 RX ORDER — LOSARTAN POTASSIUM 100 MG/1
1 TABLET, FILM COATED ORAL
Qty: 0 | Refills: 0 | DISCHARGE

## 2021-11-22 RX ADMIN — HEPARIN SODIUM 5000 UNIT(S): 5000 INJECTION INTRAVENOUS; SUBCUTANEOUS at 17:39

## 2021-11-22 RX ADMIN — HEPARIN SODIUM 5000 UNIT(S): 5000 INJECTION INTRAVENOUS; SUBCUTANEOUS at 05:49

## 2021-11-22 RX ADMIN — CLOPIDOGREL BISULFATE 75 MILLIGRAM(S): 75 TABLET, FILM COATED ORAL at 11:50

## 2021-11-22 RX ADMIN — PANTOPRAZOLE SODIUM 40 MILLIGRAM(S): 20 TABLET, DELAYED RELEASE ORAL at 05:51

## 2021-11-22 RX ADMIN — Medication 81 MILLIGRAM(S): at 11:50

## 2021-11-22 RX ADMIN — FINASTERIDE 5 MILLIGRAM(S): 5 TABLET, FILM COATED ORAL at 11:50

## 2021-11-22 RX ADMIN — Medication 325 MILLIGRAM(S): at 11:50

## 2021-11-22 RX ADMIN — ATORVASTATIN CALCIUM 40 MILLIGRAM(S): 80 TABLET, FILM COATED ORAL at 21:43

## 2021-11-22 RX ADMIN — Medication 25 MILLIGRAM(S): at 05:50

## 2021-11-22 RX ADMIN — Medication 25 MILLIGRAM(S): at 05:49

## 2021-11-22 RX ADMIN — Medication 25 MILLIGRAM(S): at 17:39

## 2021-11-22 RX ADMIN — Medication 1 MILLIGRAM(S): at 11:50

## 2021-11-22 RX ADMIN — Medication 112 MICROGRAM(S): at 05:51

## 2021-11-22 RX ADMIN — Medication 2000 UNIT(S): at 11:50

## 2021-11-22 RX ADMIN — Medication 25 MILLIGRAM(S): at 17:38

## 2021-11-22 RX ADMIN — Medication 25 MILLIGRAM(S): at 21:41

## 2021-11-22 NOTE — PROGRESS NOTE ADULT - PROBLEM SELECTOR PLAN 4
Hb 11.3  takes po iron at home  c/w iron po
Hb 11.3  takes po iron at home  c/w iron po
on 112mcg synthroid at home  c/w home med  f/u tsh
Hb 11.3  takes po iron at home  c/w iron po
Hb 11.3  takes po iron at home  c/w iron po
on 112mcg synthroid at home  c/w home med
Hb 11.3  takes po iron at home  c/w iron po

## 2021-11-22 NOTE — PROGRESS NOTE ADULT - PROBLEM SELECTOR PROBLEM 3
HTN (hypertension)
HTN (hypertension)
Anemia
HTN (hypertension)
Anemia
HTN (hypertension)
HTN (hypertension)

## 2021-11-22 NOTE — PROGRESS NOTE ADULT - PROBLEM SELECTOR PLAN 3
on amlodipine, atenolol, losartan for htn  monitor bp  c/w home meds
Hb 11.3  f/u anemia panel  takes po iron at home  c/w iron po
Hb 11.3  takes po iron at home  c/w iron po
on amlodipine, atenolol, losartan for htn  monitor bp  c/w home meds

## 2021-11-22 NOTE — PROGRESS NOTE ADULT - PROBLEM SELECTOR PLAN 2
Initially suspecting Aflutter but on closer examination just tremor.   CHADSVASC: 4  Lovenox fd ac  c/w atenolol  Tele monitoring - Goal rate <110  - Echo wnl.  - Stress test - WNL
on amlodipine, atenolol, losartan for htn  monitor bp  c/w home meds
on amlodipine, atenolol, losartan for htn  monitor bp  c/w home meds
Initially suspecting Aflutter but on closer examination just tremor.   CHADSVASC: 4  Lovenox fd ac  c/w atenolol  Tele monitoring - Goal rate <110  - Echo wnl.  - Stress test - WNL
Initially suspecting Aflutter but on closer examination just tremor.   CHADSVASC: 4  Lovenox fd ac  c/w atenolol  Tele monitoring - Goal rate <110  - Echo wnl.  - Stress test - WNL  Cardio Consulted - Dr Rose

## 2021-11-22 NOTE — PROGRESS NOTE ADULT - NUTRITIONAL ASSESSMENT
Diet, DASH/TLC:   Sodium & Cholesterol Restricted (11-15-21 @ 16:50) [Active]

## 2021-11-23 VITALS
OXYGEN SATURATION: 98 % | RESPIRATION RATE: 18 BRPM | SYSTOLIC BLOOD PRESSURE: 156 MMHG | TEMPERATURE: 98 F | HEART RATE: 72 BPM | DIASTOLIC BLOOD PRESSURE: 72 MMHG

## 2021-11-23 LAB
ALBUMIN SERPL ELPH-MCNC: 2.9 G/DL — LOW (ref 3.5–5)
ALP SERPL-CCNC: 90 U/L — SIGNIFICANT CHANGE UP (ref 40–120)
ALT FLD-CCNC: 49 U/L DA — SIGNIFICANT CHANGE UP (ref 10–60)
ANION GAP SERPL CALC-SCNC: 5 MMOL/L — SIGNIFICANT CHANGE UP (ref 5–17)
AST SERPL-CCNC: 28 U/L — SIGNIFICANT CHANGE UP (ref 10–40)
BILIRUB SERPL-MCNC: 0.3 MG/DL — SIGNIFICANT CHANGE UP (ref 0.2–1.2)
BUN SERPL-MCNC: 38 MG/DL — HIGH (ref 7–18)
CALCIUM SERPL-MCNC: 9.2 MG/DL — SIGNIFICANT CHANGE UP (ref 8.4–10.5)
CHLORIDE SERPL-SCNC: 107 MMOL/L — SIGNIFICANT CHANGE UP (ref 96–108)
CO2 SERPL-SCNC: 28 MMOL/L — SIGNIFICANT CHANGE UP (ref 22–31)
CREAT SERPL-MCNC: 1.7 MG/DL — HIGH (ref 0.5–1.3)
GLUCOSE SERPL-MCNC: 114 MG/DL — HIGH (ref 70–99)
HCT VFR BLD CALC: 34.1 % — LOW (ref 39–50)
HGB BLD-MCNC: 11.5 G/DL — LOW (ref 13–17)
MAGNESIUM SERPL-MCNC: 2.1 MG/DL — SIGNIFICANT CHANGE UP (ref 1.6–2.6)
MCHC RBC-ENTMCNC: 32.3 PG — SIGNIFICANT CHANGE UP (ref 27–34)
MCHC RBC-ENTMCNC: 33.7 GM/DL — SIGNIFICANT CHANGE UP (ref 32–36)
MCV RBC AUTO: 95.8 FL — SIGNIFICANT CHANGE UP (ref 80–100)
NRBC # BLD: 0 /100 WBCS — SIGNIFICANT CHANGE UP (ref 0–0)
PHOSPHATE SERPL-MCNC: 3.1 MG/DL — SIGNIFICANT CHANGE UP (ref 2.5–4.5)
PLATELET # BLD AUTO: 202 K/UL — SIGNIFICANT CHANGE UP (ref 150–400)
POTASSIUM SERPL-MCNC: 4 MMOL/L — SIGNIFICANT CHANGE UP (ref 3.5–5.3)
POTASSIUM SERPL-SCNC: 4 MMOL/L — SIGNIFICANT CHANGE UP (ref 3.5–5.3)
PROT SERPL-MCNC: 6.4 G/DL — SIGNIFICANT CHANGE UP (ref 6–8.3)
RBC # BLD: 3.56 M/UL — LOW (ref 4.2–5.8)
RBC # FLD: 12.7 % — SIGNIFICANT CHANGE UP (ref 10.3–14.5)
SODIUM SERPL-SCNC: 140 MMOL/L — SIGNIFICANT CHANGE UP (ref 135–145)
WBC # BLD: 8 K/UL — SIGNIFICANT CHANGE UP (ref 3.8–10.5)
WBC # FLD AUTO: 8 K/UL — SIGNIFICANT CHANGE UP (ref 3.8–10.5)

## 2021-11-23 PROCEDURE — 93306 TTE W/DOPPLER COMPLETE: CPT

## 2021-11-23 PROCEDURE — 84100 ASSAY OF PHOSPHORUS: CPT

## 2021-11-23 PROCEDURE — 76770 US EXAM ABDO BACK WALL COMP: CPT

## 2021-11-23 PROCEDURE — 71045 X-RAY EXAM CHEST 1 VIEW: CPT

## 2021-11-23 PROCEDURE — 87086 URINE CULTURE/COLONY COUNT: CPT

## 2021-11-23 PROCEDURE — 78452 HT MUSCLE IMAGE SPECT MULT: CPT

## 2021-11-23 PROCEDURE — 84300 ASSAY OF URINE SODIUM: CPT

## 2021-11-23 PROCEDURE — 99285 EMERGENCY DEPT VISIT HI MDM: CPT

## 2021-11-23 PROCEDURE — 82570 ASSAY OF URINE CREATININE: CPT

## 2021-11-23 PROCEDURE — 84484 ASSAY OF TROPONIN QUANT: CPT

## 2021-11-23 PROCEDURE — 87635 SARS-COV-2 COVID-19 AMP PRB: CPT

## 2021-11-23 PROCEDURE — 36415 COLL VENOUS BLD VENIPUNCTURE: CPT

## 2021-11-23 PROCEDURE — 84443 ASSAY THYROID STIM HORMONE: CPT

## 2021-11-23 PROCEDURE — 84105 ASSAY OF URINE PHOSPHORUS: CPT

## 2021-11-23 PROCEDURE — 80061 LIPID PANEL: CPT

## 2021-11-23 PROCEDURE — 83550 IRON BINDING TEST: CPT

## 2021-11-23 PROCEDURE — A9502: CPT

## 2021-11-23 PROCEDURE — 85025 COMPLETE CBC W/AUTO DIFF WBC: CPT

## 2021-11-23 PROCEDURE — 82728 ASSAY OF FERRITIN: CPT

## 2021-11-23 PROCEDURE — 80048 BASIC METABOLIC PNL TOTAL CA: CPT

## 2021-11-23 PROCEDURE — 93017 CV STRESS TEST TRACING ONLY: CPT

## 2021-11-23 PROCEDURE — 80053 COMPREHEN METABOLIC PANEL: CPT

## 2021-11-23 PROCEDURE — 82436 ASSAY OF URINE CHLORIDE: CPT

## 2021-11-23 PROCEDURE — 83735 ASSAY OF MAGNESIUM: CPT

## 2021-11-23 PROCEDURE — 86769 SARS-COV-2 COVID-19 ANTIBODY: CPT

## 2021-11-23 PROCEDURE — 84540 ASSAY OF URINE/UREA-N: CPT

## 2021-11-23 PROCEDURE — 85027 COMPLETE CBC AUTOMATED: CPT

## 2021-11-23 PROCEDURE — 83880 ASSAY OF NATRIURETIC PEPTIDE: CPT

## 2021-11-23 PROCEDURE — 81001 URINALYSIS AUTO W/SCOPE: CPT

## 2021-11-23 PROCEDURE — 83036 HEMOGLOBIN GLYCOSYLATED A1C: CPT

## 2021-11-23 PROCEDURE — 83540 ASSAY OF IRON: CPT

## 2021-11-23 PROCEDURE — 93005 ELECTROCARDIOGRAM TRACING: CPT

## 2021-11-23 RX ORDER — HYDRALAZINE HCL 50 MG
1 TABLET ORAL
Qty: 90 | Refills: 0
Start: 2021-11-23 | End: 2021-12-22

## 2021-11-23 RX ADMIN — CLOPIDOGREL BISULFATE 75 MILLIGRAM(S): 75 TABLET, FILM COATED ORAL at 13:41

## 2021-11-23 RX ADMIN — HEPARIN SODIUM 5000 UNIT(S): 5000 INJECTION INTRAVENOUS; SUBCUTANEOUS at 05:18

## 2021-11-23 RX ADMIN — FINASTERIDE 5 MILLIGRAM(S): 5 TABLET, FILM COATED ORAL at 13:41

## 2021-11-23 RX ADMIN — Medication 325 MILLIGRAM(S): at 13:41

## 2021-11-23 RX ADMIN — Medication 25 MILLIGRAM(S): at 05:17

## 2021-11-23 RX ADMIN — PANTOPRAZOLE SODIUM 40 MILLIGRAM(S): 20 TABLET, DELAYED RELEASE ORAL at 05:18

## 2021-11-23 RX ADMIN — Medication 112 MICROGRAM(S): at 05:18

## 2021-11-23 RX ADMIN — Medication 25 MILLIGRAM(S): at 14:37

## 2021-11-23 RX ADMIN — Medication 2000 UNIT(S): at 13:41

## 2021-11-23 RX ADMIN — Medication 1 MILLIGRAM(S): at 13:41

## 2021-11-23 RX ADMIN — Medication 81 MILLIGRAM(S): at 13:40

## 2021-11-23 NOTE — PROGRESS NOTE ADULT - SUBJECTIVE AND OBJECTIVE BOX
CHIEF COMPLAINT:Patient is a 89y old  Male who presents with a chief complaint of SOB,Palpitations.Pt appears comfortable.    	  REVIEW OF SYSTEMS:  CONSTITUTIONAL: No fever, weight loss, or fatigue  EYES: No eye pain, visual disturbances, or discharge  ENT:  No difficulty hearing, tinnitus, vertigo; No sinus or throat pain  NECK: No pain or stiffness  RESPIRATORY: No cough, wheezing, chills or hemoptysis; No Shortness of Breath  CARDIOVASCULAR: No chest pain, palpitations, passing out, dizziness, or leg swelling  GASTROINTESTINAL: No abdominal or epigastric pain. No nausea, vomiting, or hematemesis; No diarrhea or constipation. No melena or hematochezia.  GENITOURINARY: No dysuria, frequency, hematuria, or incontinence  NEUROLOGICAL: No headaches, memory loss, loss of strength, numbness, or tremors  SKIN: No itching, burning, rashes, or lesions   LYMPH Nodes: No enlarged glands  ENDOCRINE: No heat or cold intolerance; No hair loss  MUSCULOSKELETAL: No joint pain or swelling; No muscle, back, or extremity pain  PSYCHIATRIC: No depression, anxiety, mood swings, or difficulty sleeping  HEME/LYMPH: No easy bruising, or bleeding gums  ALLERGY AND IMMUNOLOGIC: No hives or eczema	        PHYSICAL EXAM:  T(C): 36.7 (11-18-21 @ 05:11), Max: 36.7 (11-18-21 @ 05:11)  HR: 75 (11-18-21 @ 05:11) (53 - 75)  BP: 145/70 (11-18-21 @ 05:11) (102/53 - 160/67)  RR: 18 (11-18-21 @ 05:11) (17 - 18)  SpO2: 98% (11-18-21 @ 05:11) (97% - 100%)  Wt(kg): --  I&O's Summary    17 Nov 2021 07:01  -  18 Nov 2021 07:00  --------------------------------------------------------  IN: 0 mL / OUT: 1480 mL / NET: -1480 mL        Appearance: Normal	  HEENT:   Normal oral mucosa, PERRL, EOMI	  Lymphatic: No lymphadenopathy  Cardiovascular: Normal S1 S2, No JVD, No murmurs, No edema  Respiratory: Lungs clear to auscultation	  Psychiatry: A & O x 3, Mood & affect appropriate  Gastrointestinal:  Soft, Non-tender, + BS	  Skin: No rashes, No ecchymoses, No cyanosis	  Neurologic: Non-focal  Extremities: Normal range of motion, No clubbing, cyanosis or edema  Vascular: Peripheral pulses palpable 2+ bilaterally    MEDICATIONS  (STANDING):  aspirin enteric coated 81 milliGRAM(s) Oral daily  atorvastatin 40 milliGRAM(s) Oral at bedtime  cefTRIAXone   IVPB 1000 milliGRAM(s) IV Intermittent every 24 hours  cholecalciferol 2000 Unit(s) Oral daily  clopidogrel Tablet 75 milliGRAM(s) Oral daily  ferrous    sulfate 325 milliGRAM(s) Oral daily  finasteride 5 milliGRAM(s) Oral daily  folic acid 1 milliGRAM(s) Oral daily  heparin   Injectable 5000 Unit(s) SubCutaneous every 12 hours  levothyroxine 112 MICROGram(s) Oral daily  metoprolol tartrate 25 milliGRAM(s) Oral two times a day  pantoprazole    Tablet 40 milliGRAM(s) Oral before breakfast  sodium chloride 0.9%. 1000 milliLiter(s) (50 mL/Hr) IV Continuous <Continuous>      TELEMETRY: 	sr 40's     	  	  LABS:	 	                       11.7   8.04  )-----------( 213      ( 17 Nov 2021 08:18 )             34.9     11-17    138  |  107  |  39<H>  ----------------------------<  127<H>  3.9   |  26  |  1.73<H>    Ca    9.0      17 Nov 2021 08:18  Phos  2.9     11-17  Mg     2.0     11-17    TPro  7.0  /  Alb  3.2<L>  /  TBili  0.6  /  DBili  x   /  AST  15  /  ALT  26  /  AlkPhos  94  11-17    proBNP: Serum Pro-Brain Natriuretic Peptide: 774 pg/mL (11-15 @ 17:00)    Lipid Profile: Cholesterol 157  HDL 41        TSH: Thyroid Stimulating Hormone, Serum: 2.11 uU/mL (11-16 @ 08:21)      	  urinUrinalysis + Microscopic Examination (11.17.21 @ 21:09)   pH Urine: 6.0   Urine Appearance: very cloudy   Urobilinogen: Negative   Specific Gravity: 1.010   Protein, Urine: 30 mg/dL   Leukocyte Esterase Concentration: Moderate   Nitrite: Positive   Ketone - Urine: Negative   Bilirubin: Negative   Color: Yellow   Glucose Qualitative, Urine: Negative   Blood, Urine: Small   Red Blood Cell - Urine: 2-5 /HPF   White Blood Cell - Urine: >50 /HPF   Epithelial Cells: Few /HPF   Bacteria: Many /HPF       Historical Values  Urinalysis + Microscopic Examination (11.17.21 @ 21:09)   pH Urine: 6.0   Urine Appearance: very cloudy   Urobilinogen: Negative   Specific Gravity: 1.010   Protein, Urine: 30 mg/dL   Leukocyte Esterase Concentration: Moderate       
  CHIEF COMPLAINT:Patient is a 89y old  Male who presents with a chief complaint of SOB,palpitations. Pt appears comfortable.    	  REVIEW OF SYSTEMS:  CONSTITUTIONAL: No fever, weight loss, or fatigue  EYES: No eye pain, visual disturbances, or discharge  ENT:  No difficulty hearing, tinnitus, vertigo; No sinus or throat pain  NECK: No pain or stiffness  RESPIRATORY: No cough, wheezing, chills or hemoptysis; No Shortness of Breath  CARDIOVASCULAR: No chest pain, palpitations, passing out, dizziness, or leg swelling  GASTROINTESTINAL: No abdominal or epigastric pain. No nausea, vomiting, or hematemesis; No diarrhea or constipation. No melena or hematochezia.  GENITOURINARY: No dysuria, frequency, hematuria, or incontinence  NEUROLOGICAL: No headaches, memory loss, loss of strength, numbness, or tremors  SKIN: No itching, burning, rashes, or lesions   LYMPH Nodes: No enlarged glands  ENDOCRINE: No heat or cold intolerance; No hair loss  MUSCULOSKELETAL: No joint pain or swelling; No muscle, back, or extremity pain  PSYCHIATRIC: No depression, anxiety, mood swings, or difficulty sleeping  HEME/LYMPH: No easy bruising, or bleeding gums  ALLERGY AND IMMUNOLOGIC: No hives or eczema	        PHYSICAL EXAM:  T(C): 36.3 (11-19-21 @ 08:11), Max: 36.7 (11-19-21 @ 04:30)  HR: 62 (11-19-21 @ 08:11) (54 - 62)  BP: 117/45 (11-19-21 @ 08:11) (97/59 - 155/64)  RR: 18 (11-19-21 @ 08:11) (17 - 18)  SpO2: 100% (11-19-21 @ 08:11) (95% - 100%)  Wt(kg): --  I&O's Summary    18 Nov 2021 07:01  -  19 Nov 2021 07:00  --------------------------------------------------------  IN: 0 mL / OUT: 1750 mL / NET: -1750 mL        Appearance: Normal	  HEENT:   Normal oral mucosa, PERRL, EOMI	  Lymphatic: No lymphadenopathy  Cardiovascular: Normal S1 S2, No JVD, No murmurs, No edema  Respiratory: Lungs clear to auscultation	  Psychiatry: A & O x 3, Mood & affect appropriate  Gastrointestinal:  Soft, Non-tender, + BS	  Skin: No rashes, No ecchymoses, No cyanosis	  Neurologic: Non-focal  Extremities: Normal range of motion, No clubbing, cyanosis or edema  Vascular: Peripheral pulses palpable 2+ bilaterally    MEDICATIONS  (STANDING):  aspirin enteric coated 81 milliGRAM(s) Oral daily  atorvastatin 40 milliGRAM(s) Oral at bedtime  cefTRIAXone   IVPB 1000 milliGRAM(s) IV Intermittent every 24 hours  cholecalciferol 2000 Unit(s) Oral daily  clopidogrel Tablet 75 milliGRAM(s) Oral daily  ferrous    sulfate 325 milliGRAM(s) Oral daily  finasteride 5 milliGRAM(s) Oral daily  folic acid 1 milliGRAM(s) Oral daily  heparin   Injectable 5000 Unit(s) SubCutaneous every 12 hours  levothyroxine 112 MICROGram(s) Oral daily  metoprolol tartrate 25 milliGRAM(s) Oral two times a day  pantoprazole    Tablet 40 milliGRAM(s) Oral before breakfast  sodium chloride 0.9%. 1000 milliLiter(s) (50 mL/Hr) IV Continuous <Continuous>      TELEMETRY: 	nsr      LABS:	 	                     11.0   6.33  )-----------( 168      ( 19 Nov 2021 05:31 )             32.1     11-19    140  |  109<H>  |  35<H>  ----------------------------<  144<H>  4.3   |  26  |  1.76<H>    Ca    8.5      19 Nov 2021 05:31  Phos  2.6     11-19  Mg     1.9     11-19    TPro  6.1  /  Alb  2.6<L>  /  TBili  0.3  /  DBili  x   /  AST  14  /  ALT  24  /  AlkPhos  81  11-19    proBNP: Serum Pro-Brain Natriuretic Peptide: 774 pg/mL (11-15 @ 17:00)    Lipid Profile: Cholesterol 157  HDL 41        TSH: Thyroid Stimulating Hormone, Serum: 2.11 uU/mL (11-16 @ 08:21)      	    sr< from: Nuclear Stress Test-Pharmacologic (11.18.21 @ 07:03) >  IMPRESSIONS:Normal Study  * Negative ECG evidence of ischemia after IV of Lexiscan.  * Review of raw data shows: Diaphragmatic artifact.  * There is a small, severe defect in basal inferior wall  that is fixed consistent with diaphragmatic attenuation  artifact.  * Post-stress resting myocardial perfusion gated SPECT  imaging was performed (LVEF = 56 %)    ------------------------------------------------------------------------      ------------------------------------------------------------------------    Confirmed on  11/18/2021 - 13:05:52 at [place] by  Anna Rose MD          
[facility] NEPHROLOGY- PROGRESS NOTE    Patient is a 88yo Male with DM, HTN, CABG, BPH w/ chronic acevedo (last changed ), PVD s/p RLE stent presents with palpitations with SOB a/w new onset Aflutter. Nephrology consulted for Elevated serum creatinine.    Hospital Medications: Medications reviewed.  REVIEW OF SYSTEMS:  CONSTITUTIONAL: No fevers or chills  RESPIRATORY: No shortness of breath +HILL  CARDIOVASCULAR: No chest pain. palpitations resolved  GASTROINTESTINAL: No nausea, vomiting, diarrhea or abdominal pain.   VASCULAR: No bilateral lower extremity edema.     VITALS:  T(F): 97 (21 @ 10:55), Max: 98.8 (21 @ 04:30)  HR: 81 (21 @ 10:55)  BP: 144/55 (21 @ 10:55)  RR: 20 (21 @ 10:55)  SpO2: 100% (21 @ 10:55)  Wt(kg): --     @ 07:  -   @ 07:00  --------------------------------------------------------  IN: 650 mL / OUT: 2000 mL / NET: -1350 mL     @ 07:01  -   @ 14:54  --------------------------------------------------------  IN: 480 mL / OUT: 400 mL / NET: 80 mL      PHYSICAL EXAM:  Gen: NAD, calm  HEENT: MMM  Neck: no JVD  Cards: RRR, +S1/S2,   Resp: CTA B/L  GI: soft, NT/ND, NABS  : +acevedo   Extremities: no LE edema B/L    LABS:      139  |  110<H>  |  27<H>  ----------------------------<  132<H>  4.1   |  26  |  1.57<H>    Ca    8.6      2021 06:25  Phos  2.6       Mg     2.0         TPro  6.2  /  Alb  2.6<L>  /  TBili  0.3  /  DBili      /  AST  17  /  ALT  27  /  AlkPhos  87      Creatinine Trend: 1.57 <--, 1.76 <--, 1.88 <--, 1.73 <--, 1.38 <--, 1.57 <--                        11.1   7.27  )-----------( 189      ( 2021 06:25 )             32.2     Urine Studies:  Urinalysis Basic - ( 2021 21:09 )    Color: Yellow / Appearance: very cloudy / S.010 / pH:   Gluc:  / Ketone: Negative  / Bili: Negative / Urobili: Negative   Blood:  / Protein: 30 mg/dL / Nitrite: Positive   Leuk Esterase: Moderate / RBC: 2-5 /HPF / WBC >50 /HPF   Sq Epi:  / Non Sq Epi: Few /HPF / Bacteria: Many /HPF      Chloride, Random Urine: 75 mmol/L ( @ 17:21)  Creatinine, Random Urine: 115 mg/dL ( @ 17:21)  Sodium, Random Urine: 67 mmol/L ( @ 17:21)  Creatinine, Random Urine: 124 mg/dL ( @ 21:09)      
[facility] NEPHROLOGY- PROGRESS NOTE    Patient is a 89yo Male with DM, HTN, CABG, BPH w/ chronic acevedo (last changed ), PVD s/p RLE stent presents with palpitations with SOB a/w new onset Aflutter. Nephrology consulted for Elevated serum creatinine.    Hospital Medications: Medications reviewed.  REVIEW OF SYSTEMS:  CONSTITUTIONAL: No fevers or chills  RESPIRATORY: No shortness of breath +HILL  CARDIOVASCULAR: No chest pain. palpitations resolved  GASTROINTESTINAL: No nausea, vomiting, diarrhea or abdominal pain.   VASCULAR: No bilateral lower extremity edema.     VITALS:  T(F): 97.9 (21 @ 19:30), Max: 98.1 (21 @ 15:57)  HR: 55 (21 @ 19:30)  BP: 137/58 (21 @ 19:30)  RR: 17 (21 @ 19:30)  SpO2: 98% (21 @ 19:30)  Wt(kg): --     @ 07:01  -   @ 07:00  --------------------------------------------------------  IN: 680 mL / OUT: 3600 mL / NET: -2920 mL     @ 07:01  -   @ 22:13  --------------------------------------------------------  IN: 0 mL / OUT: 300 mL / NET: -300 mL        PHYSICAL EXAM:  Gen: NAD, calm  HEENT: MMM  Neck: no JVD  Cards: RRR, +S1/S2,   Resp: CTA B/L  GI: soft, NT/ND, NABS  : +acevedo   Extremities: no LE edema B/L      LABS:      140  |  109<H>  |  28<H>  ----------------------------<  141<H>  4.1   |  25  |  1.53<H>    Ca    8.7      2021 05:25  Phos  2.5       Mg     1.9         TPro  5.9<L>  /  Alb  2.5<L>  /  TBili  0.3  /  DBili      /  AST  17  /  ALT  28  /  AlkPhos  79      Creatinine Trend: 1.53 <--, 1.57 <--, 1.76 <--, 1.88 <--, 1.73 <--, 1.38 <--, 1.57 <--                        11.0   6.98  )-----------( 187      ( 2021 05:25 )             32.6     Urine Studies:  Urinalysis Basic - ( 2021 21:09 )    Color: Yellow / Appearance: very cloudy / S.010 / pH:   Gluc:  / Ketone: Negative  / Bili: Negative / Urobili: Negative   Blood:  / Protein: 30 mg/dL / Nitrite: Positive   Leuk Esterase: Moderate / RBC: 2-5 /HPF / WBC >50 /HPF   Sq Epi:  / Non Sq Epi: Few /HPF / Bacteria: Many /HPF      Chloride, Random Urine: 75 mmol/L ( @ 17:21)  Creatinine, Random Urine: 115 mg/dL ( @ 17:21)  Sodium, Random Urine: 67 mmol/L ( @ 17:21)  Creatinine, Random Urine: 124 mg/dL ( @ 21:09)    
  CHIEF COMPLAINT:Patient is a 89y old  Male who presents with a chief complaint of SOB,Palpitations.Pt appears comfortable.    	  REVIEW OF SYSTEMS:  CONSTITUTIONAL: No fever, weight loss, or fatigue  EYES: No eye pain, visual disturbances, or discharge  ENT:  No difficulty hearing, tinnitus, vertigo; No sinus or throat pain  NECK: No pain or stiffness  RESPIRATORY: No cough, wheezing, chills or hemoptysis; No Shortness of Breath  CARDIOVASCULAR: No chest pain, palpitations, passing out, dizziness, or leg swelling  GASTROINTESTINAL: No abdominal or epigastric pain. No nausea, vomiting, or hematemesis; No diarrhea or constipation. No melena or hematochezia.  GENITOURINARY: No dysuria, frequency, hematuria, or incontinence  NEUROLOGICAL: No headaches, memory loss, loss of strength, numbness, or tremors  SKIN: No itching, burning, rashes, or lesions   LYMPH Nodes: No enlarged glands  ENDOCRINE: No heat or cold intolerance; No hair loss  MUSCULOSKELETAL: No joint pain or swelling; No muscle, back, or extremity pain  PSYCHIATRIC: No depression, anxiety, mood swings, or difficulty sleeping  HEME/LYMPH: No easy bruising, or bleeding gums  ALLERGY AND IMMUNOLOGIC: No hives or eczema	        PHYSICAL EXAM:  T(C): 36.4 (11-21-21 @ 08:19), Max: 36.8 (11-20-21 @ 19:34)  HR: 61 (11-21-21 @ 08:19) (58 - 81)  BP: 121/54 (11-21-21 @ 08:19) (121/54 - 175/71)  RR: 19 (11-21-21 @ 08:19) (18 - 20)  SpO2: 99% (11-21-21 @ 08:19) (96% - 100%)  Wt(kg): --  I&O's Summary    20 Nov 2021 07:01  -  21 Nov 2021 07:00  --------------------------------------------------------  IN: 680 mL / OUT: 3600 mL / NET: -2920 mL        Appearance: Normal	  HEENT:   Normal oral mucosa, PERRL, EOMI	  Lymphatic: No lymphadenopathy  Cardiovascular: Normal S1 S2, No JVD, No murmurs, No edema  Respiratory: Lungs clear to auscultation	  Psychiatry: A & O x 3, Mood & affect appropriate  Gastrointestinal:  Soft, Non-tender, + BS	  Skin: No rashes, No ecchymoses, No cyanosis	  Neurologic: Non-focal  Extremities: Normal range of motion, No clubbing, cyanosis or edema  Vascular: Peripheral pulses palpable 2+ bilaterally    MEDICATIONS  (STANDING):  aspirin enteric coated 81 milliGRAM(s) Oral daily  atorvastatin 40 milliGRAM(s) Oral at bedtime  cholecalciferol 2000 Unit(s) Oral daily  clopidogrel Tablet 75 milliGRAM(s) Oral daily  ferrous    sulfate 325 milliGRAM(s) Oral daily  finasteride 5 milliGRAM(s) Oral daily  folic acid 1 milliGRAM(s) Oral daily  heparin   Injectable 5000 Unit(s) SubCutaneous every 12 hours  hydrALAZINE 25 milliGRAM(s) Oral every 8 hours  levothyroxine 112 MICROGram(s) Oral daily  metoprolol tartrate 25 milliGRAM(s) Oral two times a day  pantoprazole    Tablet 40 milliGRAM(s) Oral before breakfast  sodium chloride 0.9%. 1000 milliLiter(s) (80 mL/Hr) IV Continuous <Continuous>      TELEMETRY: nsr	    	  	  LABS:	 	                       11.0   6.98  )-----------( 187      ( 21 Nov 2021 05:25 )             32.6     11-21    140  |  109<H>  |  28<H>  ----------------------------<  141<H>  4.1   |  25  |  1.53<H>    Ca    8.7      21 Nov 2021 05:25  Phos  2.5     11-21  Mg     1.9     11-21    TPro  5.9<L>  /  Alb  2.5<L>  /  TBili  0.3  /  DBili  x   /  AST  17  /  ALT  28  /  AlkPhos  79  11-21    proBNP: Serum Pro-Brain Natriuretic Peptide: 774 pg/mL (11-15 @ 17:00)    Lipid Profile: Cholesterol 157  LDL --  HDL 41    Ldl calc 90  Ratio --      TSH: Thyroid Stimulating Hormone, Serum: 2.11 uU/mL (11-16 @ 08:21)      	        
  CHIEF COMPLAINT:Patient is a 89y old  Male who presents with a chief complaint of SOB,Plapitations. Pt reports palpitations and sob while doing PT yesterday.    	  REVIEW OF SYSTEMS:  CONSTITUTIONAL: No fever, weight loss, or fatigue  EYES: No eye pain, visual disturbances, or discharge  ENT:  No difficulty hearing, tinnitus, vertigo; No sinus or throat pain  NECK: No pain or stiffness  RESPIRATORY: No cough, wheezing, chills or hemoptysis; + Shortness of Breath  CARDIOVASCULAR: No chest pain, + palpitations,No passing out, dizziness, or leg swelling  GASTROINTESTINAL: No abdominal or epigastric pain. No nausea, vomiting, or hematemesis; No diarrhea or constipation. No melena or hematochezia.  GENITOURINARY: No dysuria, frequency, hematuria, or incontinence  NEUROLOGICAL: No headaches, memory loss, loss of strength, numbness, or tremors  SKIN: No itching, burning, rashes, or lesions   LYMPH Nodes: No enlarged glands  ENDOCRINE: No heat or cold intolerance; No hair loss  MUSCULOSKELETAL: No joint pain or swelling; No muscle, back, or extremity pain  PSYCHIATRIC: No depression, anxiety, mood swings, or difficulty sleeping  HEME/LYMPH: No easy bruising, or bleeding gums  ALLERGY AND IMMUNOLOGIC: No hives or eczema	        PHYSICAL EXAM:  T(C): 36.8 (11-17-21 @ 07:50), Max: 36.8 (11-17-21 @ 07:50)  HR: 52 (11-17-21 @ 07:50) (50 - 79)  BP: 108/54 (11-17-21 @ 07:50) (108/54 - 147/53)  RR: 18 (11-17-21 @ 07:50) (17 - 18)  SpO2: 100% (11-17-21 @ 07:50) (96% - 100%)  Wt(kg): --  I&O's Summary    16 Nov 2021 07:01  -  17 Nov 2021 07:00  --------------------------------------------------------  IN: 0 mL / OUT: 1600 mL / NET: -1600 mL        Appearance: Normal	  HEENT:   Normal oral mucosa, PERRL, EOMI	  Lymphatic: No lymphadenopathy  Cardiovascular: Normal S1 S2, No JVD, No murmurs, No edema  Respiratory: Lungs clear to auscultation	  Psychiatry: A & O x 3, Mood & affect appropriate  Gastrointestinal:  Soft, Non-tender, + BS	  Skin: No rashes, No ecchymoses, No cyanosis	  Neurologic: Non-focal  Extremities: Normal range of motion, No clubbing, cyanosis or edema  Vascular: Peripheral pulses palpable 2+ bilaterally    MEDICATIONS  (STANDING):  aspirin enteric coated 81 milliGRAM(s) Oral daily  atorvastatin 40 milliGRAM(s) Oral at bedtime  cholecalciferol 2000 Unit(s) Oral daily  ferrous    sulfate 325 milliGRAM(s) Oral daily  finasteride 5 milliGRAM(s) Oral daily  folic acid 1 milliGRAM(s) Oral daily  heparin   Injectable 5000 Unit(s) SubCutaneous every 12 hours  levothyroxine 112 MICROGram(s) Oral daily  metoprolol tartrate 12.5 milliGRAM(s) Oral two times a day  pantoprazole    Tablet 40 milliGRAM(s) Oral before breakfast      TELEMETRY: sinus bradycardia    	  	  LABS:	 	                          11.7   8.04  )-----------( 213      ( 17 Nov 2021 08:18 )             34.9     11-17    138  |  107  |  39<H>  ----------------------------<  127<H>  3.9   |  26  |  1.73<H>    Ca    9.0      17 Nov 2021 08:18  Phos  2.9     11-17  Mg     2.0     11-17    TPro  7.0  /  Alb  3.2<L>  /  TBili  0.6  /  DBili  x   /  AST  15  /  ALT  26  /  AlkPhos  94  11-17    proBNP: Serum Pro-Brain Natriuretic Peptide: 774 pg/mL (11-15 @ 17:00)    Lipid Profile: Cholesterol 157  LDL --  HDL 41        TSH: Thyroid Stimulating Hormone, Serum: 2.11 uU/mL (11-16 @ 08:21)      	  Transthoracic Echocardiogram (11.16.21 @ 07:13) >  OBSERVATIONS:  Mitral Valve: Normal mitral valve. Mild mitral  regurgitation.  Aortic Root: Aortic Root: 3.8 cm.  Aortic Valve: Calcified trileaflet aortic valve with normal  opening. Mild aortic regurgitation.  Left Atrium: Normal left atrium.  LA volume index = 28  cc/m2.  Left Ventricle: Normal Left Ventricular Systolic Function,  (EF = 55 to 60%) Normal left ventricular internal  dimensions and wall thicknesses. Normal diastolic function.  Right Heart: Normal right atrium.Normal right ventricular  size and systolic function (TAPSE 2.0 cm). There is mild  tricuspid regurgitation. Normal pulmonic valve.  Pericardium/PleuraNormal pericardium with no pericardial  effusion.  Hemodynamic: RV systolic pressure is mildly increased at  39 mm Hg.            
  CHIEF COMPLAINT:Patient is a 89y old  Male who presents with a chief complaint of New Atrial Flutter.Pt appears comfortable.    	  REVIEW OF SYSTEMS:  CONSTITUTIONAL: No fever, weight loss, or fatigue  EYES: No eye pain, visual disturbances, or discharge  ENT:  No difficulty hearing, tinnitus, vertigo; No sinus or throat pain  NECK: No pain or stiffness  RESPIRATORY: No cough, wheezing, chills or hemoptysis; No Shortness of Breath  CARDIOVASCULAR: No chest pain, palpitations, passing out, dizziness, or leg swelling  GASTROINTESTINAL: No abdominal or epigastric pain. No nausea, vomiting, or hematemesis; No diarrhea or constipation. No melena or hematochezia.  GENITOURINARY: No dysuria, frequency, hematuria, or incontinence  NEUROLOGICAL: No headaches, memory loss, loss of strength, numbness, or tremors  SKIN: No itching, burning, rashes, or lesions   LYMPH Nodes: No enlarged glands  ENDOCRINE: No heat or cold intolerance; No hair loss  MUSCULOSKELETAL: No joint pain or swelling; No muscle, back, or extremity pain  PSYCHIATRIC: No depression, anxiety, mood swings, or difficulty sleeping  HEME/LYMPH: No easy bruising, or bleeding gums  ALLERGY AND IMMUNOLOGIC: No hives or eczema	        PHYSICAL EXAM:  T(C): 36.6 (11-22-21 @ 08:18), Max: 36.8 (11-22-21 @ 04:30)  HR: 65 (11-22-21 @ 08:18) (52 - 65)  BP: 171/57 (11-22-21 @ 08:18) (137/57 - 171/57)  RR: 18 (11-22-21 @ 08:18) (17 - 18)  SpO2: 98% (11-22-21 @ 08:18) (97% - 100%)  Wt(kg): --  I&O's Summary    21 Nov 2021 07:01  -  22 Nov 2021 07:00  --------------------------------------------------------  IN: 0 mL / OUT: 1300 mL / NET: -1300 mL        Appearance: Normal	  HEENT:   Normal oral mucosa, PERRL, EOMI	  Lymphatic: No lymphadenopathy  Cardiovascular: Normal S1 S2, No JVD, No murmurs, No edema  Respiratory: Lungs clear to auscultation	  Psychiatry: A & O x 3, Mood & affect appropriate  Gastrointestinal:  Soft, Non-tender, + BS	  Skin: No rashes, No ecchymoses, No cyanosis	  Neurologic: Non-focal  Extremities: Normal range of motion, No clubbing, cyanosis or edema  Vascular: Peripheral pulses palpable 2+ bilaterally    MEDICATIONS  (STANDING):  aspirin enteric coated 81 milliGRAM(s) Oral daily  atorvastatin 40 milliGRAM(s) Oral at bedtime  cholecalciferol 2000 Unit(s) Oral daily  clopidogrel Tablet 75 milliGRAM(s) Oral daily  ferrous    sulfate 325 milliGRAM(s) Oral daily  finasteride 5 milliGRAM(s) Oral daily  folic acid 1 milliGRAM(s) Oral daily  heparin   Injectable 5000 Unit(s) SubCutaneous every 12 hours  hydrALAZINE 25 milliGRAM(s) Oral every 8 hours  levothyroxine 112 MICROGram(s) Oral daily  metoprolol tartrate 25 milliGRAM(s) Oral two times a day  pantoprazole    Tablet 40 milliGRAM(s) Oral before breakfast  sodium chloride 0.9%. 1000 milliLiter(s) (80 mL/Hr) IV Continuous <Continuous>     	  	  LABS:	 	                      10.8   7.53  )-----------( 185      ( 22 Nov 2021 06:35 )             32.4     11-22    140  |  108  |  34<H>  ----------------------------<  118<H>  4.1   |  26  |  1.64<H>    Ca    8.9      22 Nov 2021 06:35  Phos  3.0     11-22  Mg     1.9     11-22    TPro  5.9<L>  /  Alb  2.5<L>  /  TBili  0.3  /  DBili  x   /  AST  20  /  ALT  35  /  AlkPhos  80  11-22    proBNP: Serum Pro-Brain Natriuretic Peptide: 774 pg/mL (11-15 @ 17:00)    Lipid Profile: Cholesterol 157  LDL --  HDL 41    Ldl calc 90  Ratio --      TSH: Thyroid Stimulating Hormone, Serum: 2.11 uU/mL (11-16 @ 08:21)      	        
  CHIEF COMPLAINT:Patient is a 89y old  Male who presents with a chief complaint of SOB,palpitations.Pt appears comfortable.    	  REVIEW OF SYSTEMS:  CONSTITUTIONAL: No fever, weight loss, or fatigue  EYES: No eye pain, visual disturbances, or discharge  ENT:  No difficulty hearing, tinnitus, vertigo; No sinus or throat pain  NECK: No pain or stiffness  RESPIRATORY: No cough, wheezing, chills or hemoptysis; No Shortness of Breath  CARDIOVASCULAR: No chest pain, palpitations, passing out, dizziness, or leg swelling  GASTROINTESTINAL: No abdominal or epigastric pain. No nausea, vomiting, or hematemesis; No diarrhea or constipation. No melena or hematochezia.  GENITOURINARY: No dysuria, frequency, hematuria, or incontinence  NEUROLOGICAL: No headaches, memory loss, loss of strength, numbness, or tremors  SKIN: No itching, burning, rashes, or lesions   LYMPH Nodes: No enlarged glands  ENDOCRINE: No heat or cold intolerance; No hair loss  MUSCULOSKELETAL: No joint pain or swelling; No muscle, back, or extremity pain  PSYCHIATRIC: No depression, anxiety, mood swings, or difficulty sleeping  HEME/LYMPH: No easy bruising, or bleeding gums  ALLERGY AND IMMUNOLOGIC: No hives or eczema	        PHYSICAL EXAM:  T(C): 36.2 (11-20-21 @ 07:25), Max: 37.1 (11-20-21 @ 04:30)  HR: 80 (11-20-21 @ 07:25) (54 - 80)  BP: 147/55 (11-20-21 @ 07:25) (133/57 - 174/57)  RR: 18 (11-20-21 @ 07:25) (18 - 18)  SpO2: 98% (11-20-21 @ 07:25) (98% - 99%)  Wt(kg): --  I&O's Summary    19 Nov 2021 07:01  -  20 Nov 2021 07:00  --------------------------------------------------------  IN: 650 mL / OUT: 2000 mL / NET: -1350 mL        Appearance: Normal	  HEENT:   Normal oral mucosa, PERRL, EOMI	  Lymphatic: No lymphadenopathy  Cardiovascular: Normal S1 S2, No JVD, No murmurs, No edema  Respiratory: Lungs clear to auscultation	  Psychiatry: A & O x 3, Mood & affect appropriate  Gastrointestinal:  Soft, Non-tender, + BS	  Skin: No rashes, No ecchymoses, No cyanosis	  Neurologic: Non-focal  Extremities: Normal range of motion, No clubbing, cyanosis or edema  Vascular: Peripheral pulses palpable 2+ bilaterally    MEDICATIONS  (STANDING):  aspirin enteric coated 81 milliGRAM(s) Oral daily  atorvastatin 40 milliGRAM(s) Oral at bedtime  cefTRIAXone   IVPB 1000 milliGRAM(s) IV Intermittent every 24 hours  cholecalciferol 2000 Unit(s) Oral daily  clopidogrel Tablet 75 milliGRAM(s) Oral daily  ferrous    sulfate 325 milliGRAM(s) Oral daily  finasteride 5 milliGRAM(s) Oral daily  folic acid 1 milliGRAM(s) Oral daily  heparin   Injectable 5000 Unit(s) SubCutaneous every 12 hours  levothyroxine 112 MICROGram(s) Oral daily  metoprolol tartrate 25 milliGRAM(s) Oral two times a day  pantoprazole    Tablet 40 milliGRAM(s) Oral before breakfast  sodium chloride 0.9%. 1000 milliLiter(s) (80 mL/Hr) IV Continuous <Continuous>      TELEMETRY: 	nsr      	  LABS:	 	                       11.1   7.27  )-----------( 189      ( 20 Nov 2021 06:25 )             32.2     11-20    139  |  110<H>  |  27<H>  ----------------------------<  132<H>  4.1   |  26  |  1.57<H>    Ca    8.6      20 Nov 2021 06:25  Phos  2.6     11-20  Mg     2.0     11-20    TPro  6.2  /  Alb  2.6<L>  /  TBili  0.3  /  DBili  x   /  AST  17  /  ALT  27  /  AlkPhos  87  11-20    proBNP: Serum Pro-Brain Natriuretic Peptide: 774 pg/mL (11-15 @ 17:00)    Lipid Profile: Cholesterol 157  LDL --  HDL 41    Ldl calc 90  Ratio --      TSH: Thyroid Stimulating Hormone, Serum: 2.11 uU/mL (11-16 @ 08:21)      	        
  CHIEF COMPLAINT:Patient is a 89y old  Male who presents with a chief complaint of SOB,Palpitations. Pt appears comfortable.    	  REVIEW OF SYSTEMS:  CONSTITUTIONAL: No fever, weight loss, or fatigue  EYES: No eye pain, visual disturbances, or discharge  ENT:  No difficulty hearing, tinnitus, vertigo; No sinus or throat pain  NECK: No pain or stiffness  RESPIRATORY: No cough, wheezing, chills or hemoptysis; No Shortness of Breath  CARDIOVASCULAR: No chest pain, palpitations, passing out, dizziness, or leg swelling  GASTROINTESTINAL: No abdominal or epigastric pain. No nausea, vomiting, or hematemesis; No diarrhea or constipation. No melena or hematochezia.  GENITOURINARY: No dysuria, frequency, hematuria, or incontinence  NEUROLOGICAL: No headaches, memory loss, loss of strength, numbness, or tremors  SKIN: No itching, burning, rashes, or lesions   LYMPH Nodes: No enlarged glands  ENDOCRINE: No heat or cold intolerance; No hair loss  MUSCULOSKELETAL: No joint pain or swelling; No muscle, back, or extremity pain  PSYCHIATRIC: No depression, anxiety, mood swings, or difficulty sleeping  HEME/LYMPH: No easy bruising, or bleeding gums  ALLERGY AND IMMUNOLOGIC: No hives or eczema	        PHYSICAL EXAM:  T(C): 36.4 (11-23-21 @ 07:33), Max: 36.6 (11-22-21 @ 16:10)  HR: 60 (11-23-21 @ 07:33) (56 - 70)  BP: 176/76 (11-23-21 @ 07:33) (107/64 - 176/76)  RR: 18 (11-23-21 @ 07:33) (18 - 18)  SpO2: 97% (11-23-21 @ 07:33) (95% - 100%)  Wt(kg): --  I&O's Summary    22 Nov 2021 07:01  -  23 Nov 2021 07:00  --------------------------------------------------------  IN: 0 mL / OUT: 1500 mL / NET: -1500 mL        Appearance: Normal	  HEENT:   Normal oral mucosa, PERRL, EOMI	  Lymphatic: No lymphadenopathy  Cardiovascular: Normal S1 S2, No JVD, No murmurs, No edema  Respiratory: Lungs clear to auscultation	  Psychiatry: A & O x 3, Mood & affect appropriate  Gastrointestinal:  Soft, Non-tender, + BS	  Skin: No rashes, No ecchymoses, No cyanosis	  Neurologic: Non-focal  Extremities: Normal range of motion, No clubbing, cyanosis or edema  Vascular: Peripheral pulses palpable 2+ bilaterally    MEDICATIONS  (STANDING):  aspirin enteric coated 81 milliGRAM(s) Oral daily  atorvastatin 40 milliGRAM(s) Oral at bedtime  cholecalciferol 2000 Unit(s) Oral daily  clopidogrel Tablet 75 milliGRAM(s) Oral daily  ferrous    sulfate 325 milliGRAM(s) Oral daily  finasteride 5 milliGRAM(s) Oral daily  folic acid 1 milliGRAM(s) Oral daily  heparin   Injectable 5000 Unit(s) SubCutaneous every 12 hours  hydrALAZINE 25 milliGRAM(s) Oral every 8 hours  levothyroxine 112 MICROGram(s) Oral daily  metoprolol tartrate 25 milliGRAM(s) Oral two times a day  pantoprazole    Tablet 40 milliGRAM(s) Oral before breakfast    nsr  TELEMETRY: 	    	  	  LABS:	 	                      11.5   8.00  )-----------( 202      ( 23 Nov 2021 06:37 )             34.1     11-23    140  |  107  |  38<H>  ----------------------------<  114<H>  4.0   |  28  |  1.70<H>    Ca    9.2      23 Nov 2021 06:37  Phos  3.1     11-23  Mg     2.1     11-23    TPro  6.4  /  Alb  2.9<L>  /  TBili  0.3  /  DBili  x   /  AST  28  /  ALT  49  /  AlkPhos  90  11-23    proBNP: Serum Pro-Brain Natriuretic Peptide: 774 pg/mL (11-15 @ 17:00)    Lipid Profile: Cholesterol 157  LDL --  HDL 41    Ldl calc 90  Ratio --    HgA1c:   TSH: Thyroid Stimulating Hormone, Serum: 2.11 uU/mL (11-16 @ 08:21)      	      utr< from: US Kidney and Bladder (11.22.21 @ 14:56) >  EXAM:  US KIDNEYS AND BLADDER                            PROCEDURE DATE:  11/22/2021          INTERPRETATION:  CLINICAL INFORMATION: 89 years  Male with TERI    r/o obstruction.    COMPARISON: CT abdomen and pelvis 5/12/2020    TECHNIQUE: Sonographyof the kidneys and bladder.    FINDINGS:    Right kidney: 10.9 cm. No hydronephrosis. 2.9 cm lower pole cyst.. No renal mass, hydronephrosis or calculi.    Left kidney: Not visualized    Urinary bladder: Collapsed around Infante catheter    IMPRESSION:    Unremarkable right kidney.    Remainder of the study is limited as above.    
[facility] NEPHROLOGY- PROGRESS NOTE    Patient is a 89yo Male with DM, HTN, CABG, BPH w/ chronic acevedo (last changed ), PVD s/p RLE stent presents with palpitations with SOB a/w new onset Aflutter. Nephrology consulted for Elevated serum creatinine.    Hospital Medications: Medications reviewed.  REVIEW OF SYSTEMS:  CONSTITUTIONAL: No fevers or chills  RESPIRATORY: No shortness of breath +HILL  CARDIOVASCULAR: No chest pain. palpitations resolved  GASTROINTESTINAL: No nausea, vomiting, diarrhea or abdominal pain.   VASCULAR: No bilateral lower extremity edema.     VITALS:  T(F): 96.3 (21 @ 10:58), Max: 98 (21 @ 04:30)  HR: 60 (21 @ 10:58)  BP: 133/57 (21 @ 10:58)  RR: 18 (21 @ 10:58)  SpO2: 99% (21 @ 10:58)  Wt(kg): --  Height (cm): 170.2 (11-15 @ 10:36)  Weight (kg): 77.1 (11-15 @ 10:36)  BMI (kg/m2): 26.6 (11-15 @ 10:36)  BSA (m2): 1.89 (11-15 @ 10:36)     @ 07:01  -   @ 07:00  --------------------------------------------------------  IN: 0 mL / OUT: 1750 mL / NET: -1750 mL     @ 07:01  -   @ 13:20  --------------------------------------------------------  IN: 0 mL / OUT: 500 mL / NET: -500 mL      PHYSICAL EXAM:  Gen: NAD, calm  HEENT: MMM  Neck: no JVD  Cards: RRR, +S1/S2,   Resp: CTA B/L  GI: soft, NT/ND, NABS  : +acevedo   Extremities: no LE edema B/L      LABS:      140  |  109<H>  |  35<H>  ----------------------------<  144<H>  4.3   |  26  |  1.76<H>    Ca    8.5      2021 05:31  Phos  2.6       Mg     1.9         TPro  6.1  /  Alb  2.6<L>  /  TBili  0.3  /  DBili      /  AST  14  /  ALT  24  /  AlkPhos  81      Creatinine Trend: 1.76 <--, 1.88 <--, 1.73 <--, 1.38 <--, 1.57 <--                        11.0   6.33  )-----------( 168      ( 2021 05:31 )             32.1     Urine Studies:  Urinalysis Basic - ( 2021 21:09 )    Color: Yellow / Appearance: very cloudy / S.010 / pH:   Gluc:  / Ketone: Negative  / Bili: Negative / Urobili: Negative   Blood:  / Protein: 30 mg/dL / Nitrite: Positive   Leuk Esterase: Moderate / RBC: 2-5 /HPF / WBC >50 /HPF   Sq Epi:  / Non Sq Epi: Few /HPF / Bacteria: Many /HPF      Chloride, Random Urine: 75 mmol/L ( @ 17:21)  Creatinine, Random Urine: 115 mg/dL ( @ 17:21)  Sodium, Random Urine: 67 mmol/L ( @ 17:21)  Creatinine, Random Urine: 124 mg/dL ( @ 21:09)    RADIOLOGY & ADDITIONAL STUDIES:  
[facility] NEPHROLOGY- PROGRESS NOTE    Patient is a 88yo Male with DM, HTN, CABG, BPH w/ chronic acevedo (last changed ), PVD s/p RLE stent presents with palpitations with SOB a/w new onset Aflutter. Nephrology consulted for Elevated serum creatinine.    Hospital Medications: Medications reviewed.  REVIEW OF SYSTEMS:  CONSTITUTIONAL: No fevers or chills  RESPIRATORY: No shortness of breath +HILL  CARDIOVASCULAR: No chest pain. palpitations resolved  GASTROINTESTINAL: No nausea, vomiting, diarrhea or abdominal pain.   VASCULAR: No bilateral lower extremity edema.     VITALS:  T(F): 97.8 (21 @ 19:39), Max: 98.3 (21 @ 04:30)  HR: 56 (21 @ 19:39)  BP: 107/64 (21 @ 19:39)  RR: 18 (21 @ 19:39)  SpO2: 95% (21 @ 19:39)  Wt(kg): --     @ 07:01  -   @ 07:00  --------------------------------------------------------  IN: 0 mL / OUT: 1300 mL / NET: -1300 mL     @ 07:01  -   @ 22:51  --------------------------------------------------------  IN: 0 mL / OUT: 900 mL / NET: -900 mL        PHYSICAL EXAM:  Gen: NAD, calm  HEENT: MMM  Neck: no JVD  Cards: RRR, +S1/S2,   Resp: CTA B/L  GI: soft, NT/ND, NABS  : +acevedo   Extremities: no LE edema B/L      LABS:      140  |  108  |  34<H>  ----------------------------<  118<H>  4.1   |  26  |  1.64<H>    Ca    8.9      2021 06:35  Phos  3.0       Mg     1.9         TPro  5.9<L>  /  Alb  2.5<L>  /  TBili  0.3  /  DBili      /  AST  20  /  ALT  35  /  AlkPhos  80      Creatinine Trend: 1.64 <--, 1.53 <--, 1.57 <--, 1.76 <--, 1.88 <--, 1.73 <--, 1.38 <--                        10.8   7.53  )-----------( 185      ( 2021 06:35 )             32.4     Urine Studies:  Urinalysis Basic - ( 2021 21:09 )    Color: Yellow / Appearance: very cloudy / S.010 / pH:   Gluc:  / Ketone: Negative  / Bili: Negative / Urobili: Negative   Blood:  / Protein: 30 mg/dL / Nitrite: Positive   Leuk Esterase: Moderate / RBC: 2-5 /HPF / WBC >50 /HPF   Sq Epi:  / Non Sq Epi: Few /HPF / Bacteria: Many /HPF      Chloride, Random Urine: 75 mmol/L ( @ 17:21)  Creatinine, Random Urine: 115 mg/dL ( @ 17:21)  Sodium, Random Urine: 67 mmol/L ( @ 17:21)  Creatinine, Random Urine: 124 mg/dL ( @ 21:09)    
CHIEF COMPLAINT & BRIEF HOSPITAL COURSE: HPI:  89F from home, lives alone with rollator, independent aox3 at baseline with good insight, with PMHx of DM, HTN, CABG, BPH w/ chronic acevedo (last changed ) and BPH presents with palpitations and feeling of shortness of breath since , intermittent. He reports never having arrhythmias in the past or history of heart failure/weak heart. Pt notes his symptoms are worse with ambulation. Pt denies chest pain, orthopnea, changes in his urine, loss of consciousness, fever, chills. His leg edema has stayed relatively the same over the past few weeks. (15 Nov 2021 17:46)      INTERVAL HPI/OVERNIGHT EVENTS: Patient was examined while he was lying in bed, Aox3, responding appropriately to questions, in NAD. Pt denied any chest pain, shortness of breath, nausea, vomiting or abdominal pain.       REVIEW OF SYSTEMS:  CONSTITUTIONAL: No fever, weight loss, or fatigue  RESPIRATORY: No cough, wheezing, chills or hemoptysis; No shortness of breath  CARDIOVASCULAR: No chest pain, palpitations, dizziness, or leg swelling  GASTROINTESTINAL: No abdominal pain. No nausea, vomiting, or hematemesis; No diarrhea or constipation. No melena or hematochezia.  GENITOURINARY: No dysuria or hematuria, urinary frequency  NEUROLOGICAL: No headaches, memory loss, loss of strength, numbness, or tremors  SKIN: No itching, burning, rashes, or lesions     MEDICATIONS  (STANDING):  aspirin enteric coated 81 milliGRAM(s) Oral daily  atorvastatin 40 milliGRAM(s) Oral at bedtime  cholecalciferol 2000 Unit(s) Oral daily  clopidogrel Tablet 75 milliGRAM(s) Oral daily  ferrous    sulfate 325 milliGRAM(s) Oral daily  finasteride 5 milliGRAM(s) Oral daily  folic acid 1 milliGRAM(s) Oral daily  heparin   Injectable 5000 Unit(s) SubCutaneous every 12 hours  hydrALAZINE 25 milliGRAM(s) Oral every 8 hours  levothyroxine 112 MICROGram(s) Oral daily  metoprolol tartrate 25 milliGRAM(s) Oral two times a day  pantoprazole    Tablet 40 milliGRAM(s) Oral before breakfast  sodium chloride 0.9%. 1000 milliLiter(s) (80 mL/Hr) IV Continuous <Continuous>    MEDICATIONS  (PRN):    Vital Signs Last 24 Hrs  T(C): 36.7 (21 @ 15:57), Max: 36.8 (21 @ 19:34)  T(F): 98.1 (21 @ 15:57), Max: 98.2 (21 @ 19:34)  HR: 57 (21 @ 15:57) (52 - 61)  BP: 137/57 (21 @ 15:57) (121/54 - 175/71)  BP(mean): --  RR: 17 (21 @ 15:57) (17 - 20)  SpO2: 100% (21 @ 15:57) (96% - 100%)        PHYSICAL EXAMINATION:  GENERAL: NAD, well built  HEAD:  Atraumatic, Normocephalic  EYES:  conjunctiva and sclera clear  NECK: Supple, No JVD,   CHEST/LUNG: Clear to auscultation; No rales, rhonchi, wheezing, or rubs  HEART: Regular rate and rhythm; No murmurs, rubs, or gallops  ABDOMEN: Soft, Nontender, Nondistended; Bowel sounds present  NERVOUS SYSTEM:  Alert & Oriented X3,    EXTREMITIES:  2+ Peripheral Pulses, No clubbing, cyanosis, or edema  SKIN: warm dry    LABS:      140  |  109<H>  |  28<H>  ----------------------------<  141<H>  4.1   |  25  |  1.53<H>    Ca    8.7      2021 05:25  Phos  2.5       Mg     1.9         TPro  5.9<L>  /  Alb  2.5<L>  /  TBili  0.3  /  DBili      /  AST  17  /  ALT  28  /  AlkPhos  79      Creatinine Trend: 1.53 <--, 1.57 <--, 1.76 <--, 1.88 <--, 1.73 <--, 1.38 <--, 1.57 <--                        11.0   6.98  )-----------( 187      ( 2021 05:25 )             32.6     Urine Studies:  Urinalysis Basic - ( 2021 21:09 )    Color: Yellow / Appearance: very cloudy / S.010 / pH:   Gluc:  / Ketone: Negative  / Bili: Negative / Urobili: Negative   Blood:  / Protein: 30 mg/dL / Nitrite: Positive   Leuk Esterase: Moderate / RBC: 2-5 /HPF / WBC >50 /HPF   Sq Epi:  / Non Sq Epi: Few /HPF / Bacteria: Many /HPF      Chloride, Random Urine: 75 mmol/L ( @ 17:21)  Creatinine, Random Urine: 115 mg/dL ( @ 17:21)  Sodium, Random Urine: 67 mmol/L ( @ 17:21)  Creatinine, Random Urine: 124 mg/dL ( @ 21:09)          LIVER FUNCTIONS - ( 2021 05:25 )  Alb: 2.5 g/dL / Pro: 5.9 g/dL / ALK PHOS: 79 U/L / ALT: 28 U/L DA / AST: 17 U/L / GGT: x               RADIOLOGY & ADDITIONAL TESTS:                  
PGY-1 Progress Note discussed with attending    PAGER #: [[phone]] TILL 5:00 PM  PLEASE CONTACT ON CALL TEAM:  - On Call Team (Please refer to Michael) FROM 5:00 PM - 8:30PM  - Nightfloat Team FROM 8:30 -7:30 AM    CHIEF COMPLAINT & BRIEF HOSPITAL COURSE: HPI:  89F from home, lives alone with rollator, independent aox3 at baseline with good insight, with PMHx of DM, HTN, CABG, BPH w/ chronic acevedo (last changed 11/8) and BPH presents with palpitations and feeling of shortness of breath since 11/14, intermittent. He reports never having arrhythmias in the past or history of heart failure/weak heart. Pt notes his symptoms are worse with ambulation. Pt denies chest pain, orthopnea, changes in his urine, loss of consciousness, fever, chills. His leg edema has stayed relatively the same over the past few weeks. (15 Nov 2021 17:46)      INTERVAL HPI/OVERNIGHT EVENTS: Patient was examined while he was lying in bed, Aox3, responding appropriately to questions, in NAD. Pt denied any chest pain, shortness of breath, nausea, vomiting or abdominal pain.       REVIEW OF SYSTEMS:  CONSTITUTIONAL: No fever, weight loss, or fatigue  RESPIRATORY: No cough, wheezing, chills or hemoptysis; No shortness of breath  CARDIOVASCULAR: No chest pain, palpitations, dizziness, or leg swelling  GASTROINTESTINAL: No abdominal pain. No nausea, vomiting, or hematemesis; No diarrhea or constipation. No melena or hematochezia.  GENITOURINARY: No dysuria or hematuria, urinary frequency  NEUROLOGICAL: No headaches, memory loss, loss of strength, numbness, or tremors  SKIN: No itching, burning, rashes, or lesions     Vital Signs Last 24 Hrs  T(C): 36.2 (20 Nov 2021 07:25), Max: 37.1 (20 Nov 2021 04:30)  T(F): 97.2 (20 Nov 2021 07:25), Max: 98.8 (20 Nov 2021 04:30)  HR: 80 (20 Nov 2021 07:25) (54 - 80)  BP: 147/55 (20 Nov 2021 07:25) (133/57 - 174/57)  BP(mean): --  RR: 18 (20 Nov 2021 07:25) (18 - 18)  SpO2: 98% (20 Nov 2021 07:25) (98% - 99%)    MEDICATIONS  (STANDING):  aspirin enteric coated 81 milliGRAM(s) Oral daily  atorvastatin 40 milliGRAM(s) Oral at bedtime  cefTRIAXone   IVPB 1000 milliGRAM(s) IV Intermittent every 24 hours  cholecalciferol 2000 Unit(s) Oral daily  clopidogrel Tablet 75 milliGRAM(s) Oral daily  ferrous    sulfate 325 milliGRAM(s) Oral daily  finasteride 5 milliGRAM(s) Oral daily  folic acid 1 milliGRAM(s) Oral daily  heparin   Injectable 5000 Unit(s) SubCutaneous every 12 hours  hydrALAZINE 25 milliGRAM(s) Oral every 8 hours  levothyroxine 112 MICROGram(s) Oral daily  metoprolol tartrate 25 milliGRAM(s) Oral two times a day  pantoprazole    Tablet 40 milliGRAM(s) Oral before breakfast  sodium chloride 0.9%. 1000 milliLiter(s) (80 mL/Hr) IV Continuous <Continuous>    MEDICATIONS  (PRN):      PHYSICAL EXAMINATION:  GENERAL: NAD, well built  HEAD:  Atraumatic, Normocephalic  EYES:  conjunctiva and sclera clear  NECK: Supple, No JVD,   CHEST/LUNG: Clear to auscultation; No rales, rhonchi, wheezing, or rubs  HEART: Regular rate and rhythm; No murmurs, rubs, or gallops  ABDOMEN: Soft, Nontender, Nondistended; Bowel sounds present  NERVOUS SYSTEM:  Alert & Oriented X3,    EXTREMITIES:  2+ Peripheral Pulses, No clubbing, cyanosis, or edema  SKIN: warm dry                          11.1   7.27  )-----------( 189      ( 20 Nov 2021 06:25 )             32.2     11-20    139  |  110<H>  |  27<H>  ----------------------------<  132<H>  4.1   |  26  |  1.57<H>    Ca    8.6      20 Nov 2021 06:25  Phos  2.6     11-20  Mg     2.0     11-20    TPro  6.2  /  Alb  2.6<L>  /  TBili  0.3  /  DBili  x   /  AST  17  /  ALT  27  /  AlkPhos  87  11-20    LIVER FUNCTIONS - ( 20 Nov 2021 06:25 )  Alb: 2.6 g/dL / Pro: 6.2 g/dL / ALK PHOS: 87 U/L / ALT: 27 U/L DA / AST: 17 U/L / GGT: x                   CAPILLARY BLOOD GLUCOSE      RADIOLOGY & ADDITIONAL TESTS:                  
PGY-1 Progress Note discussed with attending    PAGER #: [[phone]] TILL 5:00 PM  PLEASE CONTACT ON CALL TEAM:  - On Call Team (Please refer to Michael) FROM 5:00 PM - 8:30PM  - Nightfloat Team FROM 8:30 -7:30 AM    CHIEF COMPLAINT & BRIEF HOSPITAL COURSE: HPI:  89F from home, lives alone with rollator, independent aox3 at baseline with good insight, with PMHx of DM, HTN, CABG, BPH w/ chronic acevedo (last changed 11/8) and BPH presents with palpitations and feeling of shortness of breath since 11/14, intermittent. He reports never having arrhythmias in the past or history of heart failure/weak heart. Pt notes his symptoms are worse with ambulation. Pt denies chest pain, orthopnea, changes in his urine, loss of consciousness, fever, chills. His leg edema has stayed relatively the same over the past few weeks. (15 Nov 2021 17:46)      INTERVAL HPI/OVERNIGHT EVENTS: Patient was examined while he was lying in bed, Aox3, responding appropriately to questions, in NAD. Pt denied any chest pain, shortness of breath, nausea, vomiting or abdominal pain.       REVIEW OF SYSTEMS:  CONSTITUTIONAL: No fever, weight loss, or fatigue  RESPIRATORY: No cough, wheezing, chills or hemoptysis; No shortness of breath  CARDIOVASCULAR: No chest pain, palpitations, dizziness, or leg swelling  GASTROINTESTINAL: No abdominal pain. No nausea, vomiting, or hematemesis; No diarrhea or constipation. No melena or hematochezia.  GENITOURINARY: No dysuria or hematuria, urinary frequency  NEUROLOGICAL: No headaches, memory loss, loss of strength, numbness, or tremors  SKIN: No itching, burning, rashes, or lesions     Vital Signs Last 24 Hrs  T(C): 36.6 (17 Nov 2021 11:21), Max: 36.8 (17 Nov 2021 07:50)  T(F): 97.8 (17 Nov 2021 11:21), Max: 98.2 (17 Nov 2021 07:50)  HR: 53 (17 Nov 2021 11:21) (50 - 71)  BP: 102/53 (17 Nov 2021 11:21) (102/53 - 147/53)  BP(mean): --  RR: 18 (17 Nov 2021 11:21) (17 - 18)  SpO2: 100% (17 Nov 2021 11:21) (97% - 100%)    MEDICATIONS  (STANDING):  aspirin enteric coated 81 milliGRAM(s) Oral daily  atorvastatin 40 milliGRAM(s) Oral at bedtime  cholecalciferol 2000 Unit(s) Oral daily  clopidogrel Tablet 75 milliGRAM(s) Oral daily  ferrous    sulfate 325 milliGRAM(s) Oral daily  finasteride 5 milliGRAM(s) Oral daily  folic acid 1 milliGRAM(s) Oral daily  heparin   Injectable 5000 Unit(s) SubCutaneous every 12 hours  levothyroxine 112 MICROGram(s) Oral daily  metoprolol tartrate 12.5 milliGRAM(s) Oral two times a day  pantoprazole    Tablet 40 milliGRAM(s) Oral before breakfast  sodium chloride 0.9%. 1000 milliLiter(s) (50 mL/Hr) IV Continuous <Continuous>    MEDICATIONS  (PRN):      PHYSICAL EXAMINATION:  GENERAL: NAD, well built  HEAD:  Atraumatic, Normocephalic  EYES:  conjunctiva and sclera clear  NECK: Supple, No JVD,  CHEST/LUNG: Clear to auscultation. No rales, rhonchi, wheezing, or rubs  HEART: Regular rate and rhythm; No murmurs, rubs, or gallops  ABDOMEN: Soft, Nontender, Nondistended; Bowel sounds present  NERVOUS SYSTEM:  Alert & Oriented X3,    EXTREMITIES:  2+ Peripheral Pulses, No clubbing, cyanosis, or edema  SKIN: warm dry                          11.7   8.04  )-----------( 213      ( 17 Nov 2021 08:18 )             34.9     11-17    138  |  107  |  39<H>  ----------------------------<  127<H>  3.9   |  26  |  1.73<H>    Ca    9.0      17 Nov 2021 08:18  Phos  2.9     11-17  Mg     2.0     11-17    TPro  7.0  /  Alb  3.2<L>  /  TBili  0.6  /  DBili  x   /  AST  15  /  ALT  26  /  AlkPhos  94  11-17    LIVER FUNCTIONS - ( 17 Nov 2021 08:18 )  Alb: 3.2 g/dL / Pro: 7.0 g/dL / ALK PHOS: 94 U/L / ALT: 26 U/L DA / AST: 15 U/L / GGT: x                   CAPILLARY BLOOD GLUCOSE      RADIOLOGY & ADDITIONAL TESTS:                  
PGY-1 Progress Note discussed with attending    PAGER #: [[phone]] TILL 5:00 PM  PLEASE CONTACT ON CALL TEAM:  - On Call Team (Please refer to Michael) FROM 5:00 PM - 8:30PM  - Nightfloat Team FROM 8:30 -7:30 AM    CHIEF COMPLAINT & BRIEF HOSPITAL COURSE: HPI:  89F from home, lives alone with rollator, independent aox3 at baseline with good insight, with PMHx of DM, HTN, CABG, BPH w/ chronic acevedo (last changed 11/8) and BPH presents with palpitations and feeling of shortness of breath since 11/14, intermittent. He reports never having arrhythmias in the past or history of heart failure/weak heart. Pt notes his symptoms are worse with ambulation. Pt denies chest pain, orthopnea, changes in his urine, loss of consciousness, fever, chills. His leg edema has stayed relatively the same over the past few weeks. (15 Nov 2021 17:46)      INTERVAL HPI/OVERNIGHT EVENTS: Patient was examined while he was lying in bed, Aox3, responding appropriately to questions, in NAD. He has normal bowel and bladder movements, and denies any other acute complaints. Pt denied any chest pain, shortness of breath, nausea, vomiting or abdominal pain.       REVIEW OF SYSTEMS:  CONSTITUTIONAL: No fever, weight loss, or fatigue  RESPIRATORY: No cough, wheezing, chills or hemoptysis; No shortness of breath  CARDIOVASCULAR: No chest pain, palpitations, dizziness, or leg swelling  GASTROINTESTINAL: No abdominal pain. No nausea, vomiting, or hematemesis; No diarrhea or constipation. No melena or hematochezia.  GENITOURINARY: No dysuria or hematuria, urinary frequency  NEUROLOGICAL: No headaches, memory loss, loss of strength, numbness, or tremors  SKIN: No itching, burning, rashes, or lesions     Vital Signs Last 24 Hrs  T(C): 36.6 (18 Nov 2021 12:06), Max: 36.7 (18 Nov 2021 05:11)  T(F): 97.8 (18 Nov 2021 12:06), Max: 98 (18 Nov 2021 05:11)  HR: 60 (18 Nov 2021 12:06) (53 - 75)  BP: 97/59 (18 Nov 2021 12:06) (97/59 - 160/67)  BP(mean): --  RR: 18 (18 Nov 2021 12:06) (17 - 18)  SpO2: 99% (18 Nov 2021 12:06) (97% - 99%)    MEDICATIONS  (STANDING):  aspirin enteric coated 81 milliGRAM(s) Oral daily  atorvastatin 40 milliGRAM(s) Oral at bedtime  cefTRIAXone   IVPB 1000 milliGRAM(s) IV Intermittent every 24 hours  cholecalciferol 2000 Unit(s) Oral daily  clopidogrel Tablet 75 milliGRAM(s) Oral daily  ferrous    sulfate 325 milliGRAM(s) Oral daily  finasteride 5 milliGRAM(s) Oral daily  folic acid 1 milliGRAM(s) Oral daily  heparin   Injectable 5000 Unit(s) SubCutaneous every 12 hours  levothyroxine 112 MICROGram(s) Oral daily  metoprolol tartrate 25 milliGRAM(s) Oral two times a day  pantoprazole    Tablet 40 milliGRAM(s) Oral before breakfast  sodium chloride 0.9%. 1000 milliLiter(s) (50 mL/Hr) IV Continuous <Continuous>    MEDICATIONS  (PRN):      PHYSICAL EXAMINATION:  GENERAL: NAD, well built  HEAD:  Atraumatic, Normocephalic  EYES:  conjunctiva and sclera clear  NECK: Supple, No JVD,  CHEST/LUNG: Clear to auscultation. No rales, rhonchi, wheezing, or rubs  HEART: Regular rate and rhythm; No murmurs, rubs, or gallops  ABDOMEN: Soft, Nontender, Nondistended; Bowel sounds present  NERVOUS SYSTEM:  Alert & Oriented X3,    EXTREMITIES:  2+ Peripheral Pulses, No clubbing, cyanosis, or edema  SKIN: warm dry                          11.7   8.04  )-----------( 213      ( 17 Nov 2021 08:18 )             34.9     11-18    x   |  x   |  x   ----------------------------<  x   x    |  x   |  1.88<H>    Ca    9.0      17 Nov 2021 08:18  Phos  2.9     11-17  Mg     2.0     11-17    TPro  7.0  /  Alb  3.2<L>  /  TBili  0.6  /  DBili  x   /  AST  15  /  ALT  26  /  AlkPhos  94  11-17    LIVER FUNCTIONS - ( 17 Nov 2021 08:18 )  Alb: 3.2 g/dL / Pro: 7.0 g/dL / ALK PHOS: 94 U/L / ALT: 26 U/L DA / AST: 15 U/L / GGT: x                   CAPILLARY BLOOD GLUCOSE      RADIOLOGY & ADDITIONAL TESTS:      Urinalysis + Microscopic Examination (11.17.21 @ 21:09)    Leukocyte Esterase Concentration: Moderate    pH Urine: 6.0    Urine Appearance: very cloudy    Urobilinogen: Negative    Specific Gravity: 1.010    Protein, Urine: 30 mg/dL    Nitrite: Positive    Ketone - Urine: Negative    Bilirubin: Negative    Color: Yellow    Glucose Qualitative, Urine: Negative    Blood, Urine: Small    Red Blood Cell - Urine: 2-5 /HPF    White Blood Cell - Urine: >50 /HPF    Epithelial Cells: Few /HPF    Bacteria: Many /HPF                
PGY-1 Progress Note discussed with attending    PAGER #: [[phone]] TILL 5:00 PM  PLEASE CONTACT ON CALL TEAM:  - On Call Team (Please refer to Michael) FROM 5:00 PM - 8:30PM  - Nightfloat Team FROM 8:30 -7:30 AM    CHIEF COMPLAINT & BRIEF HOSPITAL COURSE: HPI:  89F from home, lives alone with rollator, independent aox3 at baseline with good insight, with PMHx of DM, HTN, CABG, BPH w/ chronic acevedo (last changed 11/8) and BPH presents with palpitations and feeling of shortness of breath since 11/14, intermittent. He reports never having arrhythmias in the past or history of heart failure/weak heart. Pt notes his symptoms are worse with ambulation. Pt denies chest pain, orthopnea, changes in his urine, loss of consciousness, fever, chills. His leg edema has stayed relatively the same over the past few weeks. (15 Nov 2021 17:46)      INTERVAL HPI/OVERNIGHT EVENTS: Patient was examined while he was lying in bed, Aox3, responding appropriately to questions, in NAD. He has normal bowel and bladder movements, and denies any other acute complaints. Pt denied any chest pain, shortness of breath, nausea, vomiting or abdominal pain.       REVIEW OF SYSTEMS:  CONSTITUTIONAL: No fever, weight loss, or fatigue  RESPIRATORY: No cough, wheezing, chills or hemoptysis; No shortness of breath  CARDIOVASCULAR: No chest pain, palpitations, dizziness, or leg swelling  GASTROINTESTINAL: No abdominal pain. No nausea, vomiting, or hematemesis; No diarrhea or constipation. No melena or hematochezia.  GENITOURINARY: No dysuria or hematuria, urinary frequency  NEUROLOGICAL: No headaches, memory loss, loss of strength, numbness, or tremors  SKIN: No itching, burning, rashes, or lesions     Vital Signs Last 24 Hrs  T(C): 35.7 (19 Nov 2021 10:58), Max: 36.7 (19 Nov 2021 04:30)  T(F): 96.3 (19 Nov 2021 10:58), Max: 98 (19 Nov 2021 04:30)  HR: 60 (19 Nov 2021 10:58) (54 - 62)  BP: 133/57 (19 Nov 2021 10:58) (97/59 - 155/64)  BP(mean): --  RR: 18 (19 Nov 2021 10:58) (17 - 18)  SpO2: 99% (19 Nov 2021 10:58) (95% - 100%)    MEDICATIONS  (STANDING):  aspirin enteric coated 81 milliGRAM(s) Oral daily  atorvastatin 40 milliGRAM(s) Oral at bedtime  cefTRIAXone   IVPB 1000 milliGRAM(s) IV Intermittent every 24 hours  cholecalciferol 2000 Unit(s) Oral daily  clopidogrel Tablet 75 milliGRAM(s) Oral daily  ferrous    sulfate 325 milliGRAM(s) Oral daily  finasteride 5 milliGRAM(s) Oral daily  folic acid 1 milliGRAM(s) Oral daily  heparin   Injectable 5000 Unit(s) SubCutaneous every 12 hours  levothyroxine 112 MICROGram(s) Oral daily  metoprolol tartrate 25 milliGRAM(s) Oral two times a day  pantoprazole    Tablet 40 milliGRAM(s) Oral before breakfast  sodium chloride 0.9%. 1000 milliLiter(s) (50 mL/Hr) IV Continuous <Continuous>    MEDICATIONS  (PRN):      PHYSICAL EXAMINATION:  GENERAL: NAD, well built  HEAD:  Atraumatic, Normocephalic  EYES:  conjunctiva and sclera clear  NECK: Supple, No JVD,   CHEST/LUNG: Clear to auscultation. No rales, rhonchi, wheezing, or rubs  HEART: Regular rate and rhythm; No murmurs, rubs, or gallops  ABDOMEN: Soft, Nontender, Nondistended; Bowel sounds present  NERVOUS SYSTEM:  Alert & Oriented X3,    EXTREMITIES:  2+ Peripheral Pulses, No clubbing, cyanosis, or edema  SKIN: warm dry                          11.0   6.33  )-----------( 168      ( 19 Nov 2021 05:31 )             32.1     11-19    140  |  109<H>  |  35<H>  ----------------------------<  144<H>  4.3   |  26  |  1.76<H>    Ca    8.5      19 Nov 2021 05:31  Phos  2.6     11-19  Mg     1.9     11-19    TPro  6.1  /  Alb  2.6<L>  /  TBili  0.3  /  DBili  x   /  AST  14  /  ALT  24  /  AlkPhos  81  11-19    LIVER FUNCTIONS - ( 19 Nov 2021 05:31 )  Alb: 2.6 g/dL / Pro: 6.1 g/dL / ALK PHOS: 81 U/L / ALT: 24 U/L DA / AST: 14 U/L / GGT: x                   CAPILLARY BLOOD GLUCOSE      RADIOLOGY & ADDITIONAL TESTS:                  
PGY-1 Progress Note discussed with attending    PAGER #: [[phone]] TILL 5:00 PM  PLEASE CONTACT ON CALL TEAM:  - On Call Team (Please refer to Michael) FROM 5:00 PM - 8:30PM  - Nightfloat Team FROM 8:30 -7:30 AM    CHIEF COMPLAINT & BRIEF HOSPITAL COURSE: HPI:  89F from home, lives alone with rollator, independent aox3 at baseline with good insight, with PMHx of DM, HTN, CABG, BPH w/ chronic acevedo (last changed 11/8) and BPH presents with palpitations and feeling of shortness of breath since 11/14, intermittent. He reports never having arrhythmias in the past or history of heart failure/weak heart. Pt notes his symptoms are worse with ambulation. Pt denies chest pain, orthopnea, changes in his urine, loss of consciousness, fever, chills. His leg edema has stayed relatively the same over the past few weeks. (15 Nov 2021 17:46)      INTERVAL HPI/OVERNIGHT EVENTS: Patient was examined while he was lying in bed, Aox3, responding appropriately to questions, in NAD. Pt denied any chest pain, shortness of breath, nausea, vomiting or abdominal pain.       REVIEW OF SYSTEMS:  CONSTITUTIONAL: No fever, weight loss, or fatigue  RESPIRATORY: No cough, wheezing, chills or hemoptysis; No shortness of breath  CARDIOVASCULAR: No chest pain, palpitations, dizziness, or leg swelling  GASTROINTESTINAL: No abdominal pain. No nausea, vomiting, or hematemesis; No diarrhea or constipation. No melena or hematochezia.  GENITOURINARY: No dysuria or hematuria, urinary frequency  NEUROLOGICAL: No headaches, memory loss, loss of strength, numbness, or tremors  SKIN: No itching, burning, rashes, or lesions     Vital Signs Last 24 Hrs  T(C): 36.4 (22 Nov 2021 11:21), Max: 36.8 (22 Nov 2021 04:30)  T(F): 97.5 (22 Nov 2021 11:21), Max: 98.3 (22 Nov 2021 04:30)  HR: 70 (22 Nov 2021 11:21) (55 - 70)  BP: 113/42 (22 Nov 2021 11:21) (113/42 - 171/57)  BP(mean): --  RR: 18 (22 Nov 2021 11:21) (17 - 18)  SpO2: 100% (22 Nov 2021 11:21) (97% - 100%)    MEDICATIONS  (STANDING):  aspirin enteric coated 81 milliGRAM(s) Oral daily  atorvastatin 40 milliGRAM(s) Oral at bedtime  cholecalciferol 2000 Unit(s) Oral daily  clopidogrel Tablet 75 milliGRAM(s) Oral daily  ferrous    sulfate 325 milliGRAM(s) Oral daily  finasteride 5 milliGRAM(s) Oral daily  folic acid 1 milliGRAM(s) Oral daily  heparin   Injectable 5000 Unit(s) SubCutaneous every 12 hours  hydrALAZINE 25 milliGRAM(s) Oral every 8 hours  levothyroxine 112 MICROGram(s) Oral daily  metoprolol tartrate 25 milliGRAM(s) Oral two times a day  pantoprazole    Tablet 40 milliGRAM(s) Oral before breakfast  sodium chloride 0.9%. 1000 milliLiter(s) (80 mL/Hr) IV Continuous <Continuous>    MEDICATIONS  (PRN):      PHYSICAL EXAMINATION:  GENERAL: NAD, well built  HEAD:  Atraumatic, Normocephalic  EYES:  conjunctiva and sclera clear  NECK: Supple, No JVD,   CHEST/LUNG: Clear to auscultation. No rales, rhonchi, wheezing, or rubs  HEART: Regular rate and rhythm; No murmurs, rubs, or gallops  ABDOMEN: Soft, Nontender, Nondistended; Bowel sounds present  NERVOUS SYSTEM:  Alert & Oriented X3,    EXTREMITIES:  2+ Peripheral Pulses, No clubbing, cyanosis, or edema  SKIN: warm dry                          10.8   7.53  )-----------( 185      ( 22 Nov 2021 06:35 )             32.4     11-22    140  |  108  |  34<H>  ----------------------------<  118<H>  4.1   |  26  |  1.64<H>    Ca    8.9      22 Nov 2021 06:35  Phos  3.0     11-22  Mg     1.9     11-22    TPro  5.9<L>  /  Alb  2.5<L>  /  TBili  0.3  /  DBili  x   /  AST  20  /  ALT  35  /  AlkPhos  80  11-22    LIVER FUNCTIONS - ( 22 Nov 2021 06:35 )  Alb: 2.5 g/dL / Pro: 5.9 g/dL / ALK PHOS: 80 U/L / ALT: 35 U/L DA / AST: 20 U/L / GGT: x                   CAPILLARY BLOOD GLUCOSE      RADIOLOGY & ADDITIONAL TESTS:                  
PGY-1 Progress Note discussed with attending    PAGER #: [[phone]] TILL 5:00 PM  PLEASE CONTACT ON CALL TEAM:  - On Call Team (Please refer to Michael) FROM 5:00 PM - 8:30PM  - Nightfloat Team FROM 8:30 -7:30 AM    CHIEF COMPLAINT & BRIEF HOSPITAL COURSE: HPI:  89F from home, lives alone with rollator, independent aox3 at baseline with good insight, with PMHx of DM, HTN, CABG, BPH w/ chronic acevedo (last changed 11/8) and BPH presents with palpitations and feeling of shortness of breath since 11/14, intermittent. He reports never having arrhythmias in the past or history of heart failure/weak heart. Pt notes his symptoms are worse with ambulation. Pt denies chest pain, orthopnea, changes in his urine, loss of consciousness, fever, chills. His leg edema has stayed relatively the same over the past few weeks. (15 Nov 2021 17:46)        INTERVAL HPI/OVERNIGHT EVENTS: Patient was examined while he was lying in bed, Aox3, responding appropriately to questions, in NAD.  Pt denied any chest pain, shortness of breath, nausea, vomiting or abdominal pain.       REVIEW OF SYSTEMS:  CONSTITUTIONAL: No fever, weight loss, or fatigue  RESPIRATORY: No cough, wheezing, chills or hemoptysis; No shortness of breath  CARDIOVASCULAR: No chest pain, , dizziness, or leg swelling, Palpatations  GASTROINTESTINAL: No abdominal pain. No nausea, vomiting, or hematemesis; No diarrhea or constipation. No melena or hematochezia.  GENITOURINARY: No dysuria or hematuria, urinary frequency  NEUROLOGICAL: No headaches, memory loss, loss of strength, numbness, or tremors  SKIN: No itching, burning, rashes, or lesions     Vital Signs Last 24 Hrs  T(C): 36.6 (16 Nov 2021 11:35), Max: 36.8 (15 Nov 2021 15:44)  T(F): 97.9 (16 Nov 2021 11:35), Max: 98.2 (15 Nov 2021 15:44)  HR: 57 (16 Nov 2021 11:35) (55 - 79)  BP: 127/52 (16 Nov 2021 11:35) (127/52 - 167/62)  BP(mean): --  RR: 18 (16 Nov 2021 11:35) (18 - 20)  SpO2: 99% (16 Nov 2021 11:35) (96% - 99%)    MEDICATIONS  (STANDING):  amLODIPine   Tablet 10 milliGRAM(s) Oral daily  aspirin enteric coated 81 milliGRAM(s) Oral daily  ATENolol  Tablet 50 milliGRAM(s) Oral daily  atorvastatin 40 milliGRAM(s) Oral at bedtime  cholecalciferol 2000 Unit(s) Oral daily  ferrous    sulfate 325 milliGRAM(s) Oral daily  finasteride 5 milliGRAM(s) Oral daily  folic acid 1 milliGRAM(s) Oral daily  heparin   Injectable 5000 Unit(s) SubCutaneous every 12 hours  levothyroxine 112 MICROGram(s) Oral daily  losartan 100 milliGRAM(s) Oral daily  pantoprazole    Tablet 40 milliGRAM(s) Oral before breakfast    MEDICATIONS  (PRN):      PHYSICAL EXAMINATION:  GENERAL: NAD, well built  HEAD:  Atraumatic, Normocephalic  EYES:  conjunctiva and sclera clear  NECK: Supple, No JVD,   CHEST/LUNG: Clear to auscultation. No rales, rhonchi, wheezing, or rubs  HEART: Regular rate and rhythm; No murmurs, rubs, or gallops  ABDOMEN: Soft, Nontender, Nondistended; Bowel sounds present  NERVOUS SYSTEM:  Alert & Oriented X3,    EXTREMITIES:  2+ Peripheral Pulses, No clubbing, cyanosis, or edema  SKIN: warm dry                          11.4   10.22 )-----------( 188      ( 16 Nov 2021 08:21 )             33.3     11-16    140  |  106  |  38<H>  ----------------------------<  153<H>  3.8   |  27  |  1.38<H>    Ca    9.3      16 Nov 2021 08:21  Phos  2.9     11-16  Mg     1.9     11-16    TPro  6.6  /  Alb  3.2<L>  /  TBili  0.7  /  DBili  x   /  AST  15  /  ALT  27  /  AlkPhos  86  11-16    LIVER FUNCTIONS - ( 16 Nov 2021 08:21 )  Alb: 3.2 g/dL / Pro: 6.6 g/dL / ALK PHOS: 86 U/L / ALT: 27 U/L DA / AST: 15 U/L / GGT: x                   CAPILLARY BLOOD GLUCOSE      RADIOLOGY & ADDITIONAL TESTS:

## 2021-11-23 NOTE — PROGRESS NOTE ADULT - REASON FOR ADMISSION
New Atrial Flutter
SOB,Papitations
New Atrial Flutter
SOB,Palpitation
SOB,Palpitation
SOB,Palpitations
New Atrial Flutter
New Atrial Flutter
SOB,Palpitation
SOB,Palpitations
New Atrial Flutter

## 2021-11-23 NOTE — DIETITIAN INITIAL EVALUATION ADULT. - PERTINENT MEDS FT
MEDICATIONS  (STANDING):  aspirin enteric coated 81 milliGRAM(s) Oral daily  atorvastatin 40 milliGRAM(s) Oral at bedtime  cholecalciferol 2000 Unit(s) Oral daily  clopidogrel Tablet 75 milliGRAM(s) Oral daily  ferrous    sulfate 325 milliGRAM(s) Oral daily  finasteride 5 milliGRAM(s) Oral daily  folic acid 1 milliGRAM(s) Oral daily  heparin   Injectable 5000 Unit(s) SubCutaneous every 12 hours  hydrALAZINE 25 milliGRAM(s) Oral every 8 hours  levothyroxine 112 MICROGram(s) Oral daily  metoprolol tartrate 25 milliGRAM(s) Oral two times a day  pantoprazole    Tablet 40 milliGRAM(s) Oral before breakfast

## 2021-11-23 NOTE — DIETITIAN INITIAL EVALUATION ADULT. - PROBLEM SELECTOR PLAN 1
EKG shows aflutter- regular, no discernable p waves  CHADSVASC: 4  Lovenox fd ac  c/w atenolol  Tele monitoring - Goal rate <110  f/u TTE  Cardio Consulted - Dr Rose plan per MD

## 2021-11-23 NOTE — DIETITIAN INITIAL EVALUATION ADULT. - OTHER INFO
Pt lives alone at home PTA, alert, oriented, well-communicated; appetite good PTA, denied recent wt changes, chronic diarrhea, denied chewing or swallowing problem at present, food choices obtained and forwarded to Dietary; 75 to 100% intake per flow sheet and observation; KrzB0O=0.2, pre-DM, pt not a good candidate for diet education/intervention due to advanced age, quality of life; s/p  consult for safe discharge planning noted.

## 2021-11-23 NOTE — DIETITIAN INITIAL EVALUATION ADULT. - PERTINENT LABORATORY DATA
11-23 Na140 mmol/L Glu 114 mg/dL<H> K+ 4.0 mmol/L Cr  1.70 mg/dL<H> BUN 38 mg/dL<H>   11-23 Phos 3.1 mg/dL   11-23 Alb 2.9 g/dL<L>       11-16 Chol 157 mg/dL LDL --    HDL 41 mg/dL Trig 129 mg/dL  11-16-21 @ 13:38 HgbA1C 6.2 [4.0 - 5.6]

## 2021-11-23 NOTE — PROGRESS NOTE ADULT - PROVIDER SPECIALTY LIST ADULT
Nephrology
Nephrology
Cardiology
Cardiology
Nephrology
Cardiology
Nephrology
Internal Medicine

## 2021-11-23 NOTE — PROGRESS NOTE ADULT - ASSESSMENT
89 yr old Male with PMHX of CAD-s/p CABG,HTN,Hypothyroidism,Lipid d/o,BPH ,chronic acevedo (last changed 11/8) ,BPH from home, lives alone with rollator, independent aox3 at baseline with good insight,  presents with palpitations and feeling of shortness of breath,TERI and low  bp.  1.D/C Tele monitoring.  2.Records obtained from outpatient cardiologist , placed in chart,  3.No afib/flutter seen.  4.CAD-asa, lopressor 25mg bid,,statin.  5.HTN- hydralazine 25mg q8h.  6.Hypothyroidism-synthroid.  7.GI and DVT prophylaxis.  8.TERI-IVF.Nephro sono,Nephro f/u.  
89 yr old Male with PMHX of CAD-s/p CABG,HTN,Hypothyroidism,Lipid d/o,BPH ,chronic acevedo (last changed 11/8) ,BPH from home, lives alone with rollator, independent aox3 at baseline with good insight,  presents with palpitations and feeling of shortness of breath,TERI and low  bp.  1.D/C Tele monitoring.  2.Records obtained from outpatient cardiologist , placed in chart,  3.No afib/flutter seen.  4.CAD-asa, lopressor 25mg bid,,statin.  5.HTN- hydralazine 25mg q8h.  6.Hypothyroidism-synthroid.  7.GI and DVT prophylaxis.  8.TERI-d/c IVF.,Nephro f/u.  
89 yr old Male with PMHX of CAD-s/p CABG,HTN,Hypothyroidism,Lipid d/o,BPH ,chronic acevedo (last changed 11/8) ,BPH from home, lives alone with rollator, independent aox3 at baseline with good insight,  presents with palpitations and feeling of shortness of breath,TERI and low  bp.  1.Tele monitoring.  2.Records obtained from outpatient cardiologist , placed in chart,  3.No afib/flutter seen.  4.CAD-asa, lopressor 25mg bid,,statin.  5.HTN- hydralazine 25mg q8h.  6.Hypothyroidism-synthroid.  7.GI and DVT prophylaxis.  8.TERI-IVF.Nephro sono,Nephro f/u.  
89 yr old Male with PMHX of CAD-s/p CABG,HTN,Hypothyroidism,Lipid d/o,BPH ,chronic acevedo (last changed 11/8) ,BPH from home, lives alone with rollator, independent aox3 at baseline with good insight,  presents with palpitations and feeling of shortness of breath,TERI and low  bp.  1.Tele monitoring.  2.Records obtained from outpatient cardiologist , placed in chart,  3.No afib/flutter seen.  4.CAD-asa, lopressor 25mg bid,,statin.  5.HTN-add hydralazine 25mg q8h.  6.Hypothyroidism-synthroid.  7.GI and DVT prophylaxis.  8.TERI-IVF.Nephro sono,Nephro f/u.  9.UTI-ABX completed..
89F from home, lives alone with rollator, independent aox3 at baseline with good insight, with PMHx of DM, HTN, CABG, BPH w/ chronic acevedo (last changed 11/8) and BPH presents with palpitations and feeling of shortness of breath since 11/14 admitted for new a flutter.
Patient is a 88yo Male with DM, HTN, CABG, BPH w/ chronic acevedo (last changed 11/8), PVD s/p RLE stent presents with palpitations with SOB a/w new onset Aflutter. Nephrology consulted for Elevated serum creatinine.    1. TERI- in the setting of infection; Active UA in the setting of UTI. Losartan held due to TERI. FeNa 0.79% and FeUrea 0.18%; pre-renal; recc NS @ 80cc/hr x 24 hrs. Check Renal US. Strict I/Os. Avoid nephrotoxins/ NSAIDs/ RCA. Monitor BMP.  2. CKD-3b- previous SCr 1.4-1.7 in 2020. Defer secondary w/u at this time. Avoid nephrotoxins.   3. HTN 2/2 CKD- BP acceptable. Losartan held due to TERI. c/w  metoprolol 25mg PO bid. Monitor BP  4. BPH- with chronic acevedo. c/w Finasteride. Recc to change acevedo  5. Acute cystitis- +UA, UCx pending. c/w [facility] NEPHROLOGY  Nathaniel Morrell M.D.  Shoaib Villa D.O.  Emily Latham M.D.  Jennifer Mackay, MSN, ANP-C  [phone]    [address] [place] [address]  
Patient is a 89yo Male with DM, HTN, CABG, BPH w/ chronic acevedo (last changed 11/8), PVD s/p RLE stent presents with palpitations with SOB a/w new onset Aflutter. Nephrology consulted for Elevated serum creatinine.    1. TERI- in the setting of infection; Active UA in the setting of UTI. Losartan held due to TERI. FeNa 0.79% and FeUrea 0.18%; pre-renal; No further improvement with IVF.  Monitor off IVF for now. Creatinine starting to increase slowly.  If creatinine higher tomorrow, will add fran olga IVF.    Check Renal US. Strict I/Os. Avoid nephrotoxins/ NSAIDs/ RCA. Monitor BMP.  2. CKD-3b- previous SCr 1.4-1.7 in 2020. Defer secondary w/u at this time. Avoid nephrotoxins.   3. HTN 2/2 CKD- BP acceptable. Losartan held due to TERI. c/w  metoprolol 25mg PO bid. Monitor BP  4. BPH- with chronic acevedo. c/w Finasteride. Recc to change acevedo  5. Acute cystitis- +UA, UCx pending. c/w Ceftriaxone      [facility] NEPHROLOGY  Nathaniel Morrell M.D.  Shoaib Villa D.O.  Emily Latham M.D.  Jennifer Mackay, MSN, ANP-C  [phone]    [address] [place] [address]  
89 yr old Male with PMHX of CAD-s/p CABG,HTN,Hypothyroidism,Lipid d/o,BPH ,chronic acevedo (last changed 11/8) ,BPH from home, lives alone with rollator, independent aox3 at baseline with good insight,  presents with palpitations and feeling of shortness of breath,TERI and low  bp.  1.Tele monitoring.  2.Records obtained from outpatient cardiologist , placed in chart,  3.No afib/flutter seen.  4.CAD-asa, lopressor 25mg bid,,statin.  5.HTN-off cozaar and norvasc.  6.Hypothyroidism-synthroid.  7.GI and DVT prophylaxis.  8.TERI-IVF.Nephro sono,Nephro eval noted.  9.UTI-+UA-check cx,ABX.
89F from home, lives alone with rollator, independent aox3 at baseline with good insight, with PMHx of DM, HTN, CABG, BPH w/ chronic acevedo (last changed 11/8) and BPH presents with palpitations and feeling of shortness of breath since 11/14 admitted for new a flutter.
Patient is a 88yo Male with DM, HTN, CABG, BPH w/ chronic acevedo (last changed 11/8), PVD s/p RLE stent presents with palpitations with SOB a/w new onset Aflutter. Nephrology consulted for Elevated serum creatinine.    1. TERI- in the setting of infection; Active UA in the setting of UTI. Losartan held due to TERI. FeNa 0.79% and FeUrea 0.18%; pre-renal; No further improvement with IVF.  Monitor off IVF for now.    Check Renal US. Strict I/Os. Avoid nephrotoxins/ NSAIDs/ RCA. Monitor BMP.  2. CKD-3b- previous SCr 1.4-1.7 in 2020. Defer secondary w/u at this time. Avoid nephrotoxins.   3. HTN 2/2 CKD- BP acceptable. Losartan held due to TERI. c/w  metoprolol 25mg PO bid. Monitor BP  4. BPH- with chronic acevedo. c/w Finasteride. Recc to change acevedo  5. Acute cystitis- +UA, UCx pending. c/w [facility] NEPHROLOGY  Nathaniel Morrell M.D.  Shoaib Villa D.O.  Emily Latham M.D.  Jennifer Mackay, MSN, ANP-C  [phone]    [address] [place] [address]  
89 yr old Male with PMHX of CAD-s/p CABG,HTN,Hypothyroidism,Lipid d/o,BPH ,chronic acevedo (last changed 11/8) ,BPH from home, lives alone with rollator, independent aox3 at baseline with good insight,  presents with palpitations and feeling of shortness of breath,TERI and low  bp.  1.Tele monitoring.  2.Records obtained from outpatient cardiologist , placed in chart,  3.No afib/flutter seen.  4.CAD-asa,inc lopressor 25mg bid,,statin.  5.HTN-off cozaar and norvasc.  6.Hypothyroidism-synthroid.  7.GI and DVT prophylaxis.  8.TERI-IVF.Nephro sono.  9.Stress test -R/O ischemia.  10.+UA-check cx,ABX.
89F from home, lives alone with rollator, independent aox3 at baseline with good insight, with PMHx of DM, HTN, CABG, BPH w/ chronic acevedo (last changed 11/8) and BPH presents with palpitations and feeling of shortness of breath since 11/14 admitted for new a flutter.
Patient is a 89yo Male with DM, HTN, CABG, BPH w/ chronic acevedo (last changed 11/8), PVD s/p RLE stent presents with palpitations with SOB a/w new onset Aflutter. Nephrology consulted for Elevated serum creatinine.    1. TERI- in the setting of infection; Active UA in the setting of UTI. Losartan held due to TERI. FeNa 0.79% and FeUrea 0.18%; pre-renal; Continue NS @ 80cc/hr x 24 hrs and monitor renal fxn. Check Renal US. Strict I/Os. Avoid nephrotoxins/ NSAIDs/ RCA. Monitor BMP.  2. CKD-3b- previous SCr 1.4-1.7 in 2020. Defer secondary w/u at this time. Avoid nephrotoxins.   3. HTN 2/2 CKD- BP acceptable. Losartan held due to TERI. c/w  metoprolol 25mg PO bid. Monitor BP  4. BPH- with chronic acevedo. c/w Finasteride. Recc to change acevedo  5. Acute cystitis- +UA, UCx pending. c/w Ceftriaxone      [facility] NEPHROLOGY  Nathaniel Morrell M.D.  Shoaib Villa D.O.  Emily Latham M.D.  Jennifer Mackay, MSN, ANP-C  [phone]    [address] [place] [id number]  
89 yr old Male with PMHX of CAD-s/p CABG,HTN,Hypothyroidism,Lipid d/o,BPH ,chronic acevedo (last changed 11/8) ,BPH from home, lives alone with rollator, independent aox3 at baseline with good insight,  presents with palpitations and feeling of shortness of breath,TERI and low  bp.  1.Tele monitoring.  2.Records obtained from outpatient cardiologist , placed in chart,  3.No afib/flutter seen.  4.CAD-asa,b blocker change from atenolol to lopressor due to elevated cr,,statin.  5.HTN-d/c cozaar and norvasc.  6.Hypothyroidism-synthroid.  7.GI and DVT prophylaxis.  8.TERI-IVF.Nephro sono.  9.Stress test in am.
89F from home, lives alone with rollator, independent aox3 at baseline with good insight, with PMHx of DM, HTN, CABG, BPH w/ chronic acevedo (last changed 11/8) and BPH presents with palpitations and feeling of shortness of breath since 11/14 admitted for new a flutter.

## 2022-03-02 NOTE — CONSULT NOTE ADULT - PROVIDER SPECIALTY LIST ADULT
Nephrology Xolair Pregnancy And Lactation Text: This medication is Pregnancy Category B and is considered safe during pregnancy. This medication is excreted in breast milk.

## 2022-03-18 ENCOUNTER — EMERGENCY (EMERGENCY)
Facility: HOSPITAL | Age: 87
LOS: 1 days | Discharge: HOME | End: 2022-03-18
Attending: EMERGENCY MEDICINE
Payer: MEDICARE

## 2022-03-18 VITALS
TEMPERATURE: 98 F | OXYGEN SATURATION: 98 % | SYSTOLIC BLOOD PRESSURE: 174 MMHG | RESPIRATION RATE: 18 BRPM | DIASTOLIC BLOOD PRESSURE: 62 MMHG | HEART RATE: 70 BPM

## 2022-03-18 VITALS
SYSTOLIC BLOOD PRESSURE: 132 MMHG | HEART RATE: 80 BPM | RESPIRATION RATE: 18 BRPM | DIASTOLIC BLOOD PRESSURE: 72 MMHG | OXYGEN SATURATION: 98 % | HEIGHT: 67 IN | TEMPERATURE: 98 F

## 2022-03-18 LAB
ALBUMIN SERPL ELPH-MCNC: 3.6 G/DL — SIGNIFICANT CHANGE UP (ref 3.5–5)
ALP SERPL-CCNC: 103 U/L — SIGNIFICANT CHANGE UP (ref 40–120)
ALT FLD-CCNC: 27 U/L DA — SIGNIFICANT CHANGE UP (ref 10–60)
ANION GAP SERPL CALC-SCNC: 5 MMOL/L — SIGNIFICANT CHANGE UP (ref 5–17)
APPEARANCE UR: ABNORMAL
AST SERPL-CCNC: 17 U/L — SIGNIFICANT CHANGE UP (ref 10–40)
BASOPHILS # BLD AUTO: 0.03 K/UL — SIGNIFICANT CHANGE UP (ref 0–0.2)
BASOPHILS NFR BLD AUTO: 0.4 % — SIGNIFICANT CHANGE UP (ref 0–2)
BILIRUB SERPL-MCNC: 0.3 MG/DL — SIGNIFICANT CHANGE UP (ref 0.2–1.2)
BILIRUB UR-MCNC: NEGATIVE — SIGNIFICANT CHANGE UP
BUN SERPL-MCNC: 57 MG/DL — HIGH (ref 7–18)
CALCIUM SERPL-MCNC: 9.5 MG/DL — SIGNIFICANT CHANGE UP (ref 8.4–10.5)
CHLORIDE SERPL-SCNC: 104 MMOL/L — SIGNIFICANT CHANGE UP (ref 96–108)
CO2 SERPL-SCNC: 29 MMOL/L — SIGNIFICANT CHANGE UP (ref 22–31)
COLOR SPEC: ABNORMAL
CREAT SERPL-MCNC: 1.87 MG/DL — HIGH (ref 0.5–1.3)
DIFF PNL FLD: ABNORMAL
EGFR: 34 ML/MIN/1.73M2 — LOW
EOSINOPHIL # BLD AUTO: 0.06 K/UL — SIGNIFICANT CHANGE UP (ref 0–0.5)
EOSINOPHIL NFR BLD AUTO: 0.8 % — SIGNIFICANT CHANGE UP (ref 0–6)
GLUCOSE SERPL-MCNC: 125 MG/DL — HIGH (ref 70–99)
GLUCOSE UR QL: NEGATIVE — SIGNIFICANT CHANGE UP
HCT VFR BLD CALC: 34.3 % — LOW (ref 39–50)
HGB BLD-MCNC: 11.5 G/DL — LOW (ref 13–17)
IMM GRANULOCYTES NFR BLD AUTO: 0.4 % — SIGNIFICANT CHANGE UP (ref 0–1.5)
KETONES UR-MCNC: ABNORMAL
LEUKOCYTE ESTERASE UR-ACNC: ABNORMAL
LYMPHOCYTES # BLD AUTO: 1.1 K/UL — SIGNIFICANT CHANGE UP (ref 1–3.3)
LYMPHOCYTES # BLD AUTO: 13.8 % — SIGNIFICANT CHANGE UP (ref 13–44)
MCHC RBC-ENTMCNC: 31.2 PG — SIGNIFICANT CHANGE UP (ref 27–34)
MCHC RBC-ENTMCNC: 33.5 GM/DL — SIGNIFICANT CHANGE UP (ref 32–36)
MCV RBC AUTO: 93 FL — SIGNIFICANT CHANGE UP (ref 80–100)
MONOCYTES # BLD AUTO: 0.66 K/UL — SIGNIFICANT CHANGE UP (ref 0–0.9)
MONOCYTES NFR BLD AUTO: 8.3 % — SIGNIFICANT CHANGE UP (ref 2–14)
NEUTROPHILS # BLD AUTO: 6.07 K/UL — SIGNIFICANT CHANGE UP (ref 1.8–7.4)
NEUTROPHILS NFR BLD AUTO: 76.3 % — SIGNIFICANT CHANGE UP (ref 43–77)
NITRITE UR-MCNC: POSITIVE
NRBC # BLD: 0 /100 WBCS — SIGNIFICANT CHANGE UP (ref 0–0)
PH UR: 5 — SIGNIFICANT CHANGE UP (ref 5–8)
PLATELET # BLD AUTO: 191 K/UL — SIGNIFICANT CHANGE UP (ref 150–400)
POTASSIUM SERPL-MCNC: 4.4 MMOL/L — SIGNIFICANT CHANGE UP (ref 3.5–5.3)
POTASSIUM SERPL-SCNC: 4.4 MMOL/L — SIGNIFICANT CHANGE UP (ref 3.5–5.3)
PROT SERPL-MCNC: 7.3 G/DL — SIGNIFICANT CHANGE UP (ref 6–8.3)
PROT UR-MCNC: 100
RBC # BLD: 3.69 M/UL — LOW (ref 4.2–5.8)
RBC # FLD: 13.2 % — SIGNIFICANT CHANGE UP (ref 10.3–14.5)
SODIUM SERPL-SCNC: 138 MMOL/L — SIGNIFICANT CHANGE UP (ref 135–145)
SP GR SPEC: 1.02 — SIGNIFICANT CHANGE UP (ref 1.01–1.02)
UROBILINOGEN FLD QL: 1
WBC # BLD: 7.95 K/UL — SIGNIFICANT CHANGE UP (ref 3.8–10.5)
WBC # FLD AUTO: 7.95 K/UL — SIGNIFICANT CHANGE UP (ref 3.8–10.5)

## 2022-03-18 PROCEDURE — 99284 EMERGENCY DEPT VISIT MOD MDM: CPT

## 2022-03-18 PROCEDURE — 85025 COMPLETE CBC W/AUTO DIFF WBC: CPT

## 2022-03-18 PROCEDURE — 87086 URINE CULTURE/COLONY COUNT: CPT

## 2022-03-18 PROCEDURE — 99283 EMERGENCY DEPT VISIT LOW MDM: CPT

## 2022-03-18 PROCEDURE — 81001 URINALYSIS AUTO W/SCOPE: CPT

## 2022-03-18 PROCEDURE — 36415 COLL VENOUS BLD VENIPUNCTURE: CPT

## 2022-03-18 PROCEDURE — 80053 COMPREHEN METABOLIC PANEL: CPT

## 2022-03-18 RX ORDER — CEFUROXIME AXETIL 250 MG
250 TABLET ORAL EVERY 12 HOURS
Refills: 0 | Status: COMPLETED | OUTPATIENT
Start: 2022-03-18 | End: 2022-03-18

## 2022-03-18 RX ORDER — CEFUROXIME AXETIL 250 MG
1 TABLET ORAL
Qty: 14 | Refills: 0
Start: 2022-03-18 | End: 2022-03-24

## 2022-03-18 RX ADMIN — Medication 250 MILLIGRAM(S): at 19:43

## 2022-03-18 NOTE — ED PROVIDER NOTE - NSICDXPASTMEDICALHX_GEN_ALL_CORE_FT
PAST MEDICAL HISTORY:  BPH (Benign Prostatic Hyperplasia)     CAD (Coronary Artery Disease) CABG-5-6 yrs ago- [facility]    Dyslipidemia     HTN - Hypertension     Hypothyroidism     PAD (Peripheral Artery Disease) lower legs    S/P CABG (Coronary Artery Bypass Graft) x 2 vessels -2005or 2006 [facility]

## 2022-03-18 NOTE — ED PROVIDER NOTE - PROGRESS NOTE DETAILS
pt with UTI.  of note, patient, also has a right lower extremity cellulitis.  Will dc with abx, and PMD follow up.

## 2022-03-18 NOTE — ED PROVIDER NOTE - PATIENT PORTAL LINK FT
You can access the FollowMyHealth Patient Portal offered by [facility] by registering at the following website: [email or web address]. By joining [facility]’s FollowMyHealth portal, you will also be able to view your health information using other applications (apps) compatible with our system.

## 2022-03-18 NOTE — ED ADULT NURSE NOTE - NSICDXPASTMEDICALHX_GEN_ALL_CORE_FT
PAST MEDICAL HISTORY:  BPH (Benign Prostatic Hyperplasia)     CAD (Coronary Artery Disease) CABG-5-6 yrs ago- [facility]    Dyslipidemia     HTN - Hypertension     Hypothyroidism     PAD (Peripheral Artery Disease) lower legs    S/P CABG (Coronary Artery Bypass Graft) x 2 vessels -2005or 2006 [facility]

## 2022-03-18 NOTE — ED PROVIDER NOTE - CLINICAL SUMMARY MEDICAL DECISION MAKING FREE TEXT BOX
Rule out urinary tract infection and less likely to be kidney stones. Get labs, urinalaysis, and reassess the patient.

## 2022-03-18 NOTE — ED ADULT NURSE NOTE - NS PRO AD PATIENT TYPE
Health Care Proxy (HCP)
[FreeTextEntry1] : Discussed wt loss, decrease carbs and added sugars, avoid prepared foods

## 2022-03-18 NOTE — ED PROVIDER NOTE - OBJECTIVE STATEMENT
89 year old male patient with a history of hypertension, Coronary artery disease, hypothyroid, and hyperlipidemia complains of hematuria in Infante bag. The patient states that the Infante was change last week and that he noticed dark content in the Infante this week. He wasn't sure if it was blood or not. Patient denies blood clots, urinary obstruction, fever, vomiting and nausea.     Allergies: Morphine- Nausea.  Bactrim - chills

## 2022-03-23 LAB
CULTURE RESULTS: SIGNIFICANT CHANGE UP
SPECIMEN SOURCE: SIGNIFICANT CHANGE UP

## 2023-05-16 NOTE — PHYSICAL THERAPY INITIAL EVALUATION ADULT - RANGE OF MOTION EXAMINATION, REHAB EVAL
no ROM deficits were identified O-Z Plasty Text: The defect edges were debeveled with a #15 scalpel blade.  Given the location of the defect, shape of the defect and the proximity to free margins an O-Z plasty (double transposition flap) was deemed most appropriate.  Using a sterile surgical marker, the appropriate transposition flaps were drawn incorporating the defect and placing the expected incisions within the relaxed skin tension lines where possible.    The area thus outlined was incised deep to adipose tissue with a #15 scalpel blade.  The skin margins were undermined to an appropriate distance in all directions utilizing iris scissors.  Hemostasis was achieved with electrocautery.  The flaps were then transposed into place, one clockwise and the other counterclockwise, and anchored with interrupted buried subcutaneous sutures.

## 2023-11-07 NOTE — ED ADULT NURSE NOTE - NS PRO AD BILL OF RIGHTS
----- Message from Latricia Rich sent at 11/7/2023  2:15 PM CST -----  Contact: Pt [phone]  Patient is calling back today because no one called her yesterday to get the Hospital follow up appointment.         Patient needs a Hosp follow up appt with their PCP only.      When is the next available appointment:  11/29     Symptoms:  eyes runny, itchy throat, headaches, hair loss, losing weight, kidney pain and sinus symptoms. Pt is also requesting lab work.      Discharge date:      Needs to be seen by:     Would you prefer an answer via [employer]?: no      Comments:      Please call and advise.    Thank You       Yes

## 2023-12-31 NOTE — ED ADULT NURSE NOTE - NS ED NURSE REPORT GIVEN DT
Nephrology Progress Note      Subjective  Resting   No new complains    ROS:  Resp:negative  CV:negative  GI:negative  Gu:negative    Physical Examination:  Vital Signs:  Visit Vitals  /68 (BP Location: RUE - Right upper extremity, Patient Position: Sitting)   Pulse 90   Temp 97.7 °F (36.5 °C) (Oral)   Resp 16   Ht 5' 4\" (1.626 m)   Wt 62.5 kg (137 lb 12.6 oz)   SpO2 94%   BMI 23.65 kg/m²     General:  No acute distress.    Head:  Normocephalic-atraumatic.   Neck:  Trachea is midline.   Eyes:  Normal conjunctivae.    ENT:   Mucous membranes are moist.    Cardiovascular:  S1, S2 auscultated.  Regular rate and rhythm.   Respiratory:   Bilateral breath sounds audible  Gastrointestinal:  Soft and nontender.  Non-distended.  Positive bowel sounds.    Musculoskeletal:  No edema   Skin: Warm and dry.  No rash noted.  Neurologic:   Follows simple commands.  Psychiatric:   Calm.  Cooperative.       I/O's    Intake/Output Summary (Last 24 hours) at 12/31/2023 1240  Last data filed at 12/31/2023 0836  Gross per 24 hour   Intake 406.09 ml   Output 1051 ml   Net -644.91 ml         Labs:    Recent Labs   Lab 12/31/23  0314 12/30/23  0331 12/29/23  0338   SODIUM 131* 130* 131*   POTASSIUM 3.3* 3.7 3.8   CHLORIDE 99 99 98   CO2 25 25 26   BUN 20 20 18   CREATININE 0.70 0.68 0.65   GLUCOSE 128* 141* 158*   CALCIUM 8.1* 8.5 8.0*        Recent Labs   Lab 12/31/23  0314 12/30/23  0331 12/29/23  0338 12/28/23  0440   SODIUM 131* 130* 131* 131*   CHLORIDE 99 99 98 100   CO2 25 25 26 25   BUN 20 20 18 17   CREATININE 0.70 0.68 0.65 0.52   CALCIUM 8.1* 8.5 8.0* 8.9   ALBUMIN 2.5* 2.7*  --  3.0*   BILIRUBIN 0.6 0.7  --  0.6   ALKPT 75 72  --  87   GPT 31 14  --  18   AST 32 11  --  18   GLUCOSE 128* 141* 158* 124*        Recent Labs   Lab 12/31/23  0314   WBC 12.9*   RBC 3.75*   HGB 11.6*   HCT 34.6*           Current Facility-Administered Medications   Medication Dose Route  Frequency Provider Last Rate Last Admin    potassium CHLORIDE (KLOR-CON) packet 40 mEq  40 mEq Oral BID Damaris Womack MD   40 mEq at 12/31/23 1029    sodium chloride tablet 1,000 mg  1 g Oral TID  Damaris Womack MD   1,000 mg at 12/31/23 1212    piperacillin-tazobactam (ZOSYN) 3.375 g in sodium chloride 0.9 % 100 mL IVPB  3.375 g Intravenous 3 times per day Timothy Alas MD 25 mL/hr at 12/31/23 0836 Rate Verify at 12/31/23 0836    docusate sodium-sennosides (SENOKOT S) 50-8.6 MG 2 tablet  2 tablet Oral Daily Leanna Castanon CNP   2 tablet at 12/31/23 0813    sodium chloride 0.9 % injection 2 mL  2 mL Intracatheter 2 times per day Violet Pfeiffer CNP   2 mL at 12/31/23 0814    amLODIPine (NORVASC) tablet 5 mg  5 mg Oral Daily Violet Pfeiffer CNP   5 mg at 12/31/23 0813    latanoprost (XALATAN) 0.005 % ophthalmic solution 1 drop  1 drop Both Eyes Nightly Violet Pfeiffer CNP   1 drop at 12/30/23 2051    levothyroxine (SYNTHROID, LEVOTHROID) tablet 75 mcg  75 mcg Oral Daily Violet Pfeiffer CNP   75 mcg at 12/31/23 0813    memantine (NAMENDA) tablet 10 mg  10 mg Oral BID Violet Pfeiffer CNP   10 mg at 12/31/23 0813       Current Facility-Administered Medications   Medication Dose Route Frequency Provider Last Rate Last Admin       Impression:  1.  Hyponatremia.    - Work up consistent with SIADH  - Continue to monitor chemistries.  - Encourage protein intake.   - Continue free water restriction.  - Continue salt tabs   - Adequate pain control  - Avoid further thiazide use    2.  Back pain/compression fracture L2.  S/P kyphoplasty.    3.  Hypertension  - Continue amlodipine    4.  Mass in the sigmoid colon.      5.  Hypothyroidism. TSH at goal        .   12-May-2020 15:44 13-May-2020 09:56

## 2024-08-31 NOTE — PROGRESS NOTE ADULT - COVID-19 NEGATIVE LAB RESULT
08/31/24 1203   Provider Notification   Provider Name/Title Dr. Dior   Method of Notification In Department     Dr. Dior reviewed strip in department. Aware of minimal to moderate variability with occasional accels, baseline 135, one gradual deceleration for 5 minutes with tom to 125. Overall reassuring at this time.    No new orders.   COVID-19 ruled out

## 2025-05-27 NOTE — H&P ADULT - REASON FOR ADMISSION
1305: Pt. Arrived from [facility] transferred to [facility] chair. Tolerated well. Pt. Assisted in getting dressed.   1320: Pt.'s spouse is on his way will take pt down to roundabout to meet him.   1333: Pt. Escorted out via w/c by CNA.    Hematuria